# Patient Record
Sex: FEMALE | Race: WHITE | NOT HISPANIC OR LATINO | Employment: UNEMPLOYED | URBAN - METROPOLITAN AREA
[De-identification: names, ages, dates, MRNs, and addresses within clinical notes are randomized per-mention and may not be internally consistent; named-entity substitution may affect disease eponyms.]

---

## 2019-09-09 ENCOUNTER — TRANSCRIBE ORDERS (OUTPATIENT)
Dept: LAB | Facility: CLINIC | Age: 29
End: 2019-09-09

## 2019-09-09 ENCOUNTER — APPOINTMENT (OUTPATIENT)
Dept: LAB | Facility: CLINIC | Age: 29
End: 2019-09-09

## 2019-09-09 DIAGNOSIS — Z02.1 PRE-EMPLOYMENT EXAMINATION: ICD-10-CM

## 2019-09-09 DIAGNOSIS — Z02.1 PRE-EMPLOYMENT EXAMINATION: Primary | ICD-10-CM

## 2019-09-09 LAB — RUBV IGG SERPL IA-ACNC: 96.6 IU/ML

## 2019-09-09 PROCEDURE — 86735 MUMPS ANTIBODY: CPT

## 2019-09-09 PROCEDURE — 86480 TB TEST CELL IMMUN MEASURE: CPT

## 2019-09-09 PROCEDURE — 36415 COLL VENOUS BLD VENIPUNCTURE: CPT

## 2019-09-09 PROCEDURE — 86762 RUBELLA ANTIBODY: CPT

## 2019-09-09 PROCEDURE — 86765 RUBEOLA ANTIBODY: CPT

## 2019-09-10 LAB
MEV IGG SER QL: NORMAL
MUV IGG SER QL: NORMAL

## 2019-09-11 LAB
GAMMA INTERFERON BACKGROUND BLD IA-ACNC: 0.07 IU/ML
M TB IFN-G BLD-IMP: NEGATIVE
M TB IFN-G CD4+ BCKGRND COR BLD-ACNC: -0.02 IU/ML
M TB IFN-G CD4+ BCKGRND COR BLD-ACNC: -0.04 IU/ML
MITOGEN IGNF BCKGRD COR BLD-ACNC: 1.07 IU/ML

## 2019-10-23 ENCOUNTER — TELEPHONE (OUTPATIENT)
Dept: OBGYN CLINIC | Facility: MEDICAL CENTER | Age: 29
End: 2019-10-23

## 2019-10-24 ENCOUNTER — INITIAL PRENATAL (OUTPATIENT)
Dept: OBGYN CLINIC | Facility: MEDICAL CENTER | Age: 29
End: 2019-10-24

## 2019-10-24 ENCOUNTER — APPOINTMENT (OUTPATIENT)
Dept: LAB | Facility: CLINIC | Age: 29
End: 2019-10-24
Payer: COMMERCIAL

## 2019-10-24 ENCOUNTER — INITIAL PRENATAL (OUTPATIENT)
Dept: OBGYN CLINIC | Facility: CLINIC | Age: 29
End: 2019-10-24

## 2019-10-24 VITALS — WEIGHT: 163 LBS | SYSTOLIC BLOOD PRESSURE: 100 MMHG | DIASTOLIC BLOOD PRESSURE: 68 MMHG

## 2019-10-24 DIAGNOSIS — Z34.93 ENCOUNTER FOR PREGNANCY RELATED EXAMINATION IN THIRD TRIMESTER: ICD-10-CM

## 2019-10-24 DIAGNOSIS — Z34.93 ENCOUNTER FOR PREGNANCY RELATED EXAMINATION IN THIRD TRIMESTER: Primary | ICD-10-CM

## 2019-10-24 DIAGNOSIS — Z3A.28 28 WEEKS GESTATION OF PREGNANCY: ICD-10-CM

## 2019-10-24 DIAGNOSIS — Z34.93 THIRD TRIMESTER PREGNANCY: Primary | ICD-10-CM

## 2019-10-24 LAB
ABO GROUP BLD: NORMAL
BACTERIA UR QL AUTO: NORMAL /HPF
BASOPHILS # BLD MANUAL: 0 THOUSAND/UL (ref 0–0.1)
BASOPHILS NFR MAR MANUAL: 0 % (ref 0–1)
BILIRUB UR QL STRIP: NEGATIVE
BLD GP AB SCN SERPL QL: NEGATIVE
CLARITY UR: CLEAR
COLOR UR: YELLOW
EOSINOPHIL # BLD MANUAL: 0.21 THOUSAND/UL (ref 0–0.4)
EOSINOPHIL NFR BLD MANUAL: 2 % (ref 0–6)
ERYTHROCYTE [DISTWIDTH] IN BLOOD BY AUTOMATED COUNT: 13.7 % (ref 11.6–15.1)
GLUCOSE 1H P 50 G GLC PO SERPL-MCNC: 192 MG/DL
GLUCOSE UR STRIP-MCNC: ABNORMAL MG/DL
HBV SURFACE AG SER QL: NORMAL
HCT VFR BLD AUTO: 31.5 % (ref 34.8–46.1)
HGB BLD-MCNC: 10.4 G/DL (ref 11.5–15.4)
HGB UR QL STRIP.AUTO: NEGATIVE
HYALINE CASTS #/AREA URNS LPF: NORMAL /LPF
KETONES UR STRIP-MCNC: NEGATIVE MG/DL
LEUKOCYTE ESTERASE UR QL STRIP: ABNORMAL
LYMPHOCYTES # BLD AUTO: 1.07 THOUSAND/UL (ref 0.6–4.47)
LYMPHOCYTES # BLD AUTO: 10 % (ref 14–44)
MCH RBC QN AUTO: 33.3 PG (ref 26.8–34.3)
MCHC RBC AUTO-ENTMCNC: 33 G/DL (ref 31.4–37.4)
MCV RBC AUTO: 101 FL (ref 82–98)
MONOCYTES # BLD AUTO: 0.53 THOUSAND/UL (ref 0–1.22)
MONOCYTES NFR BLD: 5 % (ref 4–12)
NEUTROPHILS # BLD MANUAL: 8.45 THOUSAND/UL (ref 1.85–7.62)
NEUTS SEG NFR BLD AUTO: 79 % (ref 43–75)
NITRITE UR QL STRIP: NEGATIVE
NON-SQ EPI CELLS URNS QL MICRO: NORMAL /HPF
NRBC BLD AUTO-RTO: 0 /100 WBCS
PH UR STRIP.AUTO: 7 [PH]
PLATELET # BLD AUTO: 275 THOUSANDS/UL (ref 149–390)
PLATELET BLD QL SMEAR: ADEQUATE
PMV BLD AUTO: 10.3 FL (ref 8.9–12.7)
POLYCHROMASIA BLD QL SMEAR: PRESENT
PROT UR STRIP-MCNC: NEGATIVE MG/DL
RBC # BLD AUTO: 3.12 MILLION/UL (ref 3.81–5.12)
RBC #/AREA URNS AUTO: NORMAL /HPF
RBC MORPH BLD: PRESENT
RH BLD: POSITIVE
RUBV IGG SERPL IA-ACNC: 103.5 IU/ML
SP GR UR STRIP.AUTO: 1.02 (ref 1–1.03)
SPECIMEN EXPIRATION DATE: NORMAL
UROBILINOGEN UR QL STRIP.AUTO: 0.2 E.U./DL
VARIANT LYMPHS # BLD AUTO: 4 %
WBC # BLD AUTO: 10.69 THOUSAND/UL (ref 4.31–10.16)
WBC #/AREA URNS AUTO: NORMAL /HPF

## 2019-10-24 PROCEDURE — 80081 OBSTETRIC PANEL INC HIV TSTG: CPT

## 2019-10-24 PROCEDURE — PNV: Performed by: NURSE PRACTITIONER

## 2019-10-24 PROCEDURE — NOBC: Performed by: OBSTETRICS & GYNECOLOGY

## 2019-10-24 PROCEDURE — 36415 COLL VENOUS BLD VENIPUNCTURE: CPT

## 2019-10-24 PROCEDURE — 82950 GLUCOSE TEST: CPT

## 2019-10-24 PROCEDURE — 87086 URINE CULTURE/COLONY COUNT: CPT

## 2019-10-24 PROCEDURE — 81001 URINALYSIS AUTO W/SCOPE: CPT

## 2019-10-24 PROCEDURE — 86787 VARICELLA-ZOSTER ANTIBODY: CPT

## 2019-10-24 RX ORDER — DOCUSATE SODIUM 100 MG/1
100 CAPSULE, LIQUID FILLED ORAL
COMMUNITY
End: 2020-01-08 | Stop reason: HOSPADM

## 2019-10-24 NOTE — PATIENT INSTRUCTIONS
Pregnancy at 32 to 30 Weeks   AMBULATORY CARE:   What changes are happening to your body: You may notice new symptoms such as shortness of breath, heartburn, or swelling of your ankles and feet  You may also have trouble sleeping or contractions  Seek care immediately if:   · You develop a severe headache that does not go away  · You have new or increased vision changes, such as blurred or spotted vision  · You have new or increased swelling in your face or hands  · You have vaginal spotting or bleeding  · Your water broke or you feel warm water gushing or trickling from your vagina  Contact your healthcare provider if:   · You have more than 5 contractions in 1 hour  · You notice any changes in your baby's movements  · You have abdominal cramps, pressure, or tightening  · You have a change in vaginal discharge  · You have chills or a fever  · You have vaginal itching, burning, or pain  · You have yellow, green, white, or foul-smelling vaginal discharge  · You have pain or burning when you urinate, less urine than usual, or pink or bloody urine  · You have questions or concerns about your condition or care  How to care for yourself at this stage of your pregnancy:   · Eat a variety of healthy foods  Healthy foods include fruits, vegetables, whole-grain breads, low-fat dairy foods, beans, lean meats, and fish  Drink liquids as directed  Ask how much liquid to drink each day and which liquids are best for you  Limit caffeine to less than 200 milligrams each day  Limit your intake of fish to 2 servings each week  Choose fish low in mercury such as canned light tuna, shrimp, salmon, cod, or tilapia  Do not  eat fish high in mercury such as swordfish, tilefish, kesha mackerel, and shark  · Manage heartburn  by eating 4 or 5 small meals each day instead of large meals  Avoid spicy food  · Manage swelling  by lying down and putting your feet up       · Take prenatal vitamins as directed  Your need for certain vitamins and minerals, such as folic acid, increases during pregnancy  Prenatal vitamins provide some of the extra vitamins and minerals you need  Prenatal vitamins may also help to decrease the risk of certain birth defects  · Talk to your healthcare provider about exercise  Moderate exercise can help you stay fit  Your healthcare provider will help you plan an exercise program that is safe for you during pregnancy  · Do not smoke  If you smoke, it is never too late to quit  Smoking increases your risk of a miscarriage and other health problems during your pregnancy  Smoking can cause your baby to be born too early or weigh less at birth  Ask your healthcare provider for information if you need help quitting  · Do not drink alcohol  Alcohol passes from your body to your baby through the placenta  It can affect your baby's brain development and cause fetal alcohol syndrome (FAS)  FAS is a group of conditions that causes mental, behavior, and growth problems  · Talk to your healthcare provider before you take any medicines  Many medicines may harm your baby if you take them when you are pregnant  Do not take any medicines, vitamins, herbs, or supplements without first talking to your healthcare provider  Never use illegal or street drugs (such as marijuana or cocaine) while you are pregnant  Safety tips during pregnancy:   · Avoid hot tubs and saunas  Do not use a hot tub or sauna while you are pregnant, especially during your first trimester  Hot tubs and saunas may raise your baby's temperature and increase the risk of birth defects  · Avoid toxoplasmosis  This is an infection caused by eating raw meat or being around infected cat feces  It can cause birth defects, miscarriages, and other problems  Wash your hands after you touch raw meat  Make sure any meat is well-cooked before you eat it  Avoid raw eggs and unpasteurized milk   Use gloves or ask someone else to clean your cat's litter box while you are pregnant  Changes that are happening with your baby:  By 30 weeks, your baby may weigh more than 3 pounds  Your baby may be about 11 inches long from the top of the head to the rump (baby's bottom)  Your baby's eyes open and close now  Your baby's kicks and movements are more forceful at this time  What you need to know about prenatal care: Your healthcare provider will check your blood pressure and weight  You may also need the following:  · Blood tests  may be done to check for anemia or blood type  · A urine test  may also be done to check for sugar and protein  These can be signs of gestational diabetes or infection  Protein in your urine may also be a sign of preeclampsia  Preeclampsia is a condition that can develop during week 20 or later of your pregnancy  It causes high blood pressure, and it can cause problems with your kidneys and other organs  · A Tdap vaccine and flu vaccine  may be recommended by your healthcare provider  · A gestational diabetes screen  will be done using an oral glucose tolerance test (OGTT)  An OGTT starts with a blood sugar level check after you have not eaten for 8 hours  You are then given a glucose drink  Your blood sugar level is checked after 1 hour, 2 hours, and sometimes 3 hours  Healthcare providers look at how much your blood sugar level increases from the first check  · Fundal height  is a measurement of your uterus to check your baby's growth  This number is usually the same as the number of weeks that you have been pregnant  Your healthcare provider may also check your baby's position  · Your baby's heart rate  will be checked  © 2017 [facility] Information is for End User's use only and may not be sold, redistributed or otherwise used for commercial purposes   All illustrations and images included in CareNotes® are the copyrighted property of A D A M [employer] Inc  or [employer]  The above information is an  only  It is not intended as medical advice for individual conditions or treatments  Talk to your doctor, nurse or pharmacist before following any medical regimen to see if it is safe and effective for you  Kick Counts in Pregnancy   WHAT YOU NEED TO KNOW:   Kick counts measure how much your baby is moving in your womb  A kick from your baby can be felt as a twist, turn, swish, roll, or jab  It is common to feel your baby kicking at 26 to 28 weeks of pregnancy  You may feel your baby kick as early as 20 weeks of pregnancy  DISCHARGE INSTRUCTIONS:   Return to the emergency department if:   · You feel your baby kick less as the day goes on      · You do not feel any kicks in a day  Contact your healthcare provider if:   · You feel a change in the number of kicks or movements of your baby  · You feel fewer than 10 kicks within 2 hours after counting twice  · You have questions or concerns about your baby's movements  Why measure kick counts:  Your baby's movement may provide information about your baby's health  He may move less, or not at all, if there are problems  He may move less if he does not have enough room to grow in your uterus (womb)  He may also move less if he is not getting enough oxygen or nutrition from the placenta  Tell your healthcare provider as soon as you feel a change in your baby's movements  Problems that are found earlier are easier to treat  When to measure kick counts:   · Measure kick counts at the same time every day  · Measure kick counts when your baby is awake and most active  Your baby may be most active in the evening  · Measure kick counts after a meal or snack  Your baby may be more active after you eat  Wait 2 hours after you drink liquids that contain caffeine  Caffeine can make your baby more active than usual     · You should not smoke while you are pregnant   Smoking increases the risk of health problems for you and for your baby during your pregnancy  If you do smoke, wait 2 hours to measure kick counts  Nicotine can make your baby more active than usual   How to measure kick counts:  Check that your baby is awake before you measure kick counts  You can wake up your baby by lightly pushing on your belly, walking, or drinking something cold  Your healthcare provider may tell you different ways to measure kick counts  He may tell you to do the following:  · Use a chart or clock to keep track of the time you start and finish counting  · Sit in a chair or lie on your left side  · Place your hands on the largest part of your belly  · Count until you reach 10 kicks  Write down how much time it takes to count 10 kicks  · It may take 30 minutes to 2 hours to count 10 kicks  It should not take more than 2 hours to count 10 kicks  · If you do not feel 10 kicks within 2 hours, wait 1 hour and count again  Your baby can sleep for up to 40 minutes at one time  Follow up with your healthcare provider as directed:  Write down your questions so you remember to ask them during your visits  © 2017 [address] [employer] Information is for End User's use only and may not be sold, redistributed or otherwise used for commercial purposes  All illustrations and images included in CareNotes® are the copyrighted property of [employer]  or Nilesh Donald  The above information is an  only  It is not intended as medical advice for individual conditions or treatments  Talk to your doctor, nurse or pharmacist before following any medical regimen to see if it is safe and effective for you

## 2019-10-24 NOTE — PROGRESS NOTES
Sharman Gottron is a 30y o  year old  at 30w11d for routine prenatal visit  [facility] late transfer of care, b/c now works for [employer]  + FM, no vaginal bleeding, contractions, or LOF  Complaints: Yes feet swelling, advised increase water and decrease sodium in her diet  Most recent ultrasound and labs reviewed  Has Level 2 scheduled at M in 2 weeks  Went for glucose, cbc and PN panel today along with g/c culture and cc urine  Rest of records up to date and in EMR  Had pt schedule out her visits with Nicholas today  Fkc, ptl precautions reviewed

## 2019-10-24 NOTE — PROGRESS NOTES
OB INTAKE INTERVIEW      Pt presents for OB intake    OB History    Para Term  AB Living   2       1     SAB TAB Ectopic Multiple Live Births                  # Outcome Date GA Lbr Terrence/2nd Weight Sex Delivery Anes PTL Lv   2 Current            1 AB 2007 9w0d             Birth Comments: surgically induced        Presents for OB ed visit  States was seen previously at [facility] and is up to date with care  Provided records with minimal information    Hx of  delivery prior to 36 weeks 6 days: NO   Last Menstrual Period:   Patient's last menstrual period was 2019   ? History of Diabetes: NO  History of Hypertension: NO      Infection Screening: Does the pt have a hx of MRSA? NO    H&P visit scheduled later today  ? Interview education  Information on [facility] Pregnancy Essentials reviewed  Handouts given: Baby and Me phone eliel guide  Baby and Me support center  [facility] Dennie Sheng in Pregnancy information sheet   [facility]  Referral for level 2 US at [facility] given  Patient will schedule appointment  Declined CF carrier and SMA carrier screening today    Discussed Tdap and Influenza vaccines         Depression Screening Follow-up Plan: Patient's depression screening was NEGATIVE with an [place] score of 5                  The patient was oriented to our practice and all questions were answered    Interviewed by: Ramses Ruff RN 10/24/19

## 2019-10-25 LAB
BACTERIA UR CULT: NORMAL
HIV 1+2 AB+HIV1 P24 AG SERPL QL IA: NORMAL
RPR SER QL: NORMAL

## 2019-10-28 ENCOUNTER — TELEPHONE (OUTPATIENT)
Dept: OBGYN CLINIC | Facility: MEDICAL CENTER | Age: 29
End: 2019-10-28

## 2019-10-28 DIAGNOSIS — O24.410 DIET CONTROLLED GESTATIONAL DIABETES MELLITUS (GDM) IN THIRD TRIMESTER: Primary | ICD-10-CM

## 2019-10-28 NOTE — TELEPHONE ENCOUNTER
----- Message from Rhiannon Lee MD sent at 10/25/2019 11:57 AM EDT -----  Elevated one hour, 192  Gestational diabetic  Needs referral to educators   Anemia on CBC - recommend ferrous sulfate 325 mg po bid and colace as needed

## 2019-10-28 NOTE — TELEPHONE ENCOUNTER
Notified of need for additional iron and diagnosis of gestational diabetes  Referral to diabetes in pregnancy program made    Will purchase OTC iron and colace

## 2019-10-29 LAB — VZV IGG SER IA-ACNC: NORMAL

## 2019-10-31 ENCOUNTER — OFFICE VISIT (OUTPATIENT)
Dept: PERINATAL CARE | Facility: CLINIC | Age: 29
End: 2019-10-31
Payer: COMMERCIAL

## 2019-10-31 DIAGNOSIS — O24.410 DIET CONTROLLED GESTATIONAL DIABETES MELLITUS (GDM) IN THIRD TRIMESTER: Primary | ICD-10-CM

## 2019-10-31 DIAGNOSIS — Z3A.29 29 WEEKS GESTATION OF PREGNANCY: ICD-10-CM

## 2019-10-31 PROCEDURE — G0109 DIAB MANAGE TRN IND/GROUP: HCPCS | Performed by: DIETITIAN, REGISTERED

## 2019-10-31 RX ORDER — BLOOD SUGAR DIAGNOSTIC
STRIP MISCELLANEOUS
Qty: 100 EACH | Refills: 4 | Status: SHIPPED | OUTPATIENT
Start: 2019-10-31 | End: 2020-01-08 | Stop reason: HOSPADM

## 2019-10-31 RX ORDER — LANCETS 33 GAUGE
EACH MISCELLANEOUS
Qty: 100 EACH | Refills: 3 | Status: SHIPPED | OUTPATIENT
Start: 2019-10-31 | End: 2020-01-10 | Stop reason: ALTCHOICE

## 2019-10-31 NOTE — PROGRESS NOTES
10/31/19  Tessa Chase   1990  Estimated Date of Delivery: 1/10/20   EGA: 29w6d    Dear Dr Rizwan Rios:    Thank you for referring your patient to the Diabetes and Pregnancy Program at [address] [place] [address]  The patient attended class 1 and patient received the following education:     Pathophysiology of diabetes and pregnancy  This includes maternal-fetal complications such as fetal macrosomia,  hypoglycemia, polyhydramnios, increased incidence of  section, pre-term labor and in severe cases, fetal demise and stillbirth   Instruction on diet and glucometer use was provided  Self-monitoring of blood glucose levels: fasting (goal 60mg/dl to 90mg/dl) and two hours after the start of the meal less (goal less than 120mg/dl)  The patient was provided with a One-Touch Verio blood glucose meter and supplies  Blood glucose during demonstration was 90   Medical Nutrition Therapy for diabetes and pregnancy  The patient was provided with a 2100 calorie meal plan and the following was reviewed: Reports she already started a meal plan by following one from the Internet  Reported she is very hungry with her new Internet plan  Pateint discovered at the end of Class 1 that she was under eating the entire past week     o Basic review of macronutrients   o Meal pattern should consist of three small meals and three snacks daily  o Carbohydrate gram amounts per meal   o Instructions on how to read a food label  o Appropriate serving sizes for carbohydrates and proteins  o Incorporating protein at each meal and snack  o Maintain a three day food diary and bring to class 2    Report blood glucose levels to the [address] weekly or as directed:  o Phone : [phone]  If no response in 24 hours, call [phone]   o Fax: [phone]  o Email: [email or web address]  The patient is scheduled to attend class 2 on Thursday, 19    Additionally, fetal ultrasound evaluation by the Perinatologist has been scheduled to assure continuity of care  Please contact the Diabetes and Pregnancy Program at [phone] if you have any questions  Time spent with patient 2:10-3:10 PM; time spent face to face counseling greater than 50% of the appointment      Sincerely,   Charisma Barlow  Diabetes Educator   Diabetes and Pregnancy Program

## 2019-11-05 ENCOUNTER — ROUTINE PRENATAL (OUTPATIENT)
Dept: PERINATAL CARE | Facility: OTHER | Age: 29
End: 2019-11-05
Payer: COMMERCIAL

## 2019-11-05 VITALS
HEIGHT: 65 IN | HEART RATE: 77 BPM | DIASTOLIC BLOOD PRESSURE: 71 MMHG | WEIGHT: 166 LBS | BODY MASS INDEX: 27.66 KG/M2 | SYSTOLIC BLOOD PRESSURE: 107 MMHG

## 2019-11-05 DIAGNOSIS — Z36.3 ENCOUNTER FOR ANTENATAL SCREENING FOR MALFORMATIONS: ICD-10-CM

## 2019-11-05 DIAGNOSIS — Z3A.30 30 WEEKS GESTATION OF PREGNANCY: ICD-10-CM

## 2019-11-05 DIAGNOSIS — O24.410 DIET CONTROLLED GESTATIONAL DIABETES MELLITUS (GDM) IN THIRD TRIMESTER: Primary | ICD-10-CM

## 2019-11-05 PROCEDURE — 76805 OB US >/= 14 WKS SNGL FETUS: CPT | Performed by: OBSTETRICS & GYNECOLOGY

## 2019-11-05 PROCEDURE — 99241 PR OFFICE CONSULTATION NEW/ESTAB PATIENT 15 MIN: CPT | Performed by: OBSTETRICS & GYNECOLOGY

## 2019-11-05 RX ORDER — FERROUS SULFATE 325(65) MG
325 TABLET ORAL
COMMUNITY
End: 2020-01-08 | Stop reason: HOSPADM

## 2019-11-05 NOTE — PATIENT INSTRUCTIONS
Kick Counts in Pregnancy   WHAT YOU NEED TO KNOW:   Kick counts measure how much your baby is moving in your womb  A kick from your baby can be felt as a twist, turn, swish, roll, or jab  It is common to feel your baby kicking at 26 to 28 weeks of pregnancy  You may feel your baby kick as early as 20 weeks of pregnancy  DISCHARGE INSTRUCTIONS:   Return to the emergency department if:   · You feel your baby kick less as the day goes on      · You do not feel any kicks in a day  Contact your healthcare provider if:   · You feel a change in the number of kicks or movements of your baby  · You feel fewer than 10 kicks within 2 hours after counting twice  · You have questions or concerns about your baby's movements  Why measure kick counts:  Your baby's movement may provide information about your baby's health  He may move less, or not at all, if there are problems  He may move less if he does not have enough room to grow in your uterus (womb)  He may also move less if he is not getting enough oxygen or nutrition from the placenta  Tell your healthcare provider as soon as you feel a change in your baby's movements  Problems that are found earlier are easier to treat  When to measure kick counts:   · Measure kick counts at the same time every day  · Measure kick counts when your baby is awake and most active  Your baby may be most active in the evening  · Measure kick counts after a meal or snack  Your baby may be more active after you eat  Wait 2 hours after you drink liquids that contain caffeine  Caffeine can make your baby more active than usual     · You should not smoke while you are pregnant  Smoking increases the risk of health problems for you and for your baby during your pregnancy  If you do smoke, wait 2 hours to measure kick counts  Nicotine can make your baby more active than usual   How to measure kick counts:  Check that your baby is awake before you measure kick counts   You can wake up your baby by lightly pushing on your belly, walking, or drinking something cold  Your healthcare provider may tell you different ways to measure kick counts  He may tell you to do the following:  · Use a chart or clock to keep track of the time you start and finish counting  · Sit in a chair or lie on your left side  · Place your hands on the largest part of your belly  · Count until you reach 10 kicks  Write down how much time it takes to count 10 kicks  · It may take 30 minutes to 2 hours to count 10 kicks  It should not take more than 2 hours to count 10 kicks  · If you do not feel 10 kicks within 2 hours, wait 1 hour and count again  Your baby can sleep for up to 40 minutes at one time  Follow up with your healthcare provider as directed:  Write down your questions so you remember to ask them during your visits  © 2017 [address] [employer] Information is for End User's use only and may not be sold, redistributed or otherwise used for commercial purposes  All illustrations and images included in CareNotes® are the copyrighted property of [employer]  or Nilesh Donald  The above information is an  only  It is not intended as medical advice for individual conditions or treatments  Talk to your doctor, nurse or pharmacist before following any medical regimen to see if it is safe and effective for you

## 2019-11-05 NOTE — LETTER
November 5, 2019     Guy Bonilla MD  [address]    Patient: Jo Ann Manuel   YOB: 1990   Date of Visit: 11/5/2019       Dear Dr Nash Oakley:    Thank you for referring Jo Ann Manuel to me for evaluation  Below are my notes for this consultation  If you have questions, please do not hesitate to call me  I look forward to following your patient along with you           Sincerely,        Lucy Barrientos MD        CC: No Recipients

## 2019-11-05 NOTE — PROGRESS NOTES
Thank you very much for your kind referral of Tessa Chase for fetal ultrasound evaluation and MFM consult at [facility] on 2019  Agustina Salinas is a 68-year-old  white female who is currently at 27 and 4/7 weeks gestation by an estimated due date of January 10, 2020 which is based upon menstrual dating  She presents for the indication of a recent diagnosis of gestational diabetes, based upon an elevated 1 hour screening Glucola value of 192 mg/dL on 2019  She recently had initial evaluation within the Diabetes and Pregnancy Program in the [facility], [facility]  Agustina Salinas recently transferred prenatal care to your practice, having had care earlier in the pregnancy at [facility] has no specific complaints today  She reports regular fetal movements and denies problems related to vaginal bleeding,  labor, or hypertension  Agustina Salinas has a history of one elective   Her past medical history is significant for an anxiety disorder, currently treated with 100 mg of Zoloft today  Her past surgical history is significant for an appendectomy, tonsillectomy, and wisdom teeth removal   She has allergies to Percocet and the pertussis vaccine  Agustina Salinas denies tobacco, alcohol, or illicit drug use during the pregnancy  Her brother had a congenital heart defect, an ASD and mitral valve abnormality  The family genetic history is otherwise negative with respect to genetic abnormalities, birth defects, or mental retardation  Her mom and dad each has hypertension  There is no first-degree family member otherwise with a diagnosis of diabetes, venous thromboembolism, or preeclampsia  No fetal structural abnormality or ultrasound marker for aneuploidy is identified on the Level II ultrasound study today   Multiple anatomic targets are suboptimally imaged secondary to unfavorable fetal position and the relatively late gestational age    The cervix is not well visualized by transabdominal sonography  Fetal growth and amniotic fluid volume are normal   The placenta is normal in appearance  Today's ultrasound findings and suggested follow-up were discussed in detail with Archana  We discussed that prenatal ultrasound cannot rule out all congenital abnormalities  Daily third trimester fetal kick counting was discussed at the visit today  Ibrahima Pulido will return in four weeks to assess interval growth and evaluate anatomic targets not imaged well today  Non stress testing is recommended for additional pregnancy surveillance if medical therapy to treat gestational diabetes is necessary, or when she reaches her estimated due date  Ibrahima Pulido should  maintain contact with the Diabetes and Pregnancy program in the [facility], [facility]  at least once a week for review of her blood glucose values and adjustment of insulin doses  Archana and I discussed that her diagnosis of gestational diabetes during this pregnancy is associated with an increased risk for the development of overt, Type II diabetes later in her life  Her risk for developing Type II diabetes is as high as 70%  A 75 gram, two-hour, OGTT is recommended as initial follow up 4-12 weeks following delivery  The face to face time, in addition to time spent discussing ultrasound results, was approximately 15 minutes, greater than 50% of which was spent during counseling and coordination of care

## 2019-11-06 ENCOUNTER — DOCUMENTATION (OUTPATIENT)
Dept: PERINATAL CARE | Facility: CLINIC | Age: 29
End: 2019-11-06

## 2019-11-06 NOTE — PROGRESS NOTES
Date:  19  RE: Emerita Johns    : 1990  Estimated Date of Delivery: 1/10/20  EGA: 30w5d  OB/GYN: [facility]ell[facility]  Diet controlled gestational diabetes          Current regimen:  2100 calorie GDM diet with 3 meals & 3 snacks  Self-Blood Glucose Monitoring with a One-Touch Verio glucose meter    Plan:  Continue current regimen  If okay by OB, walk 20-30 minutes daily  19 Ultrasound indicates normal growth & fluids  Class 2 scheduled for tomorrow, 19      Date due to report next:  Monday,     Kaylie Ruelas  Diabetes Educator   Diabetes and Pregnancy Program

## 2019-11-07 ENCOUNTER — OFFICE VISIT (OUTPATIENT)
Dept: PERINATAL CARE | Facility: CLINIC | Age: 29
End: 2019-11-07
Payer: COMMERCIAL

## 2019-11-07 VITALS
DIASTOLIC BLOOD PRESSURE: 67 MMHG | BODY MASS INDEX: 27.42 KG/M2 | HEART RATE: 78 BPM | HEIGHT: 65 IN | WEIGHT: 164.6 LBS | SYSTOLIC BLOOD PRESSURE: 101 MMHG

## 2019-11-07 DIAGNOSIS — O24.410 DIET CONTROLLED GESTATIONAL DIABETES MELLITUS (GDM) IN THIRD TRIMESTER: Primary | ICD-10-CM

## 2019-11-07 DIAGNOSIS — Z3A.30 30 WEEKS GESTATION OF PREGNANCY: ICD-10-CM

## 2019-11-07 PROCEDURE — G0108 DIAB MANAGE TRN  PER INDIV: HCPCS | Performed by: DIETITIAN, REGISTERED

## 2019-11-07 NOTE — PROGRESS NOTES
DATE:  19  RE: Winston Phoenix    : 1990    DAVID: Estimated Date of Delivery: 1/10/20    EGA: 30w6d    Dear Dr Brendon Turner:    Thank you for referring your patient to the Diabetes and Pregnancy Program at [address] [place] [address]  The patient attended Class 2 received the following education:    Weight gain during in pregnancy  Based on the patients height of 5' 5" (1 651 m) inches, pre-pregnancy weight o 135 lbs pounds (BMI-22 5), we would recommend a total weight gain of 25-35 pounds for the pregnancy   The patients current weight is 74 7 kg (164 lb 9 6 oz) pounds, and her weight gain to date is 29 5 pounds   Medical Nutrition Therapy for diabetes and pregnancy  The patients three day food diary was reviewed and discussed  The patient was instructed on the following:  o Individualized meal plan  Very concerned about controlling her BG & has been limiting her foods intake to control her BG  Has been counting her CHO gm closely  Uses many organic foods & is eating some vegetarian protein sources as not fond of meat this pregnancy  Has been counting the CHO gm properly  Discussed the importance of eating adequate calories so as to avoid starvation ketosis  Agreed to consume 30 gm proteinat all 3 meals & increase to 5 oz protein at lunch & dinner  o Use of food diary to maintain a meal plan    o Importance of protein as it relates to blood glucose control   Review of blood glucose log  Reinforcement of blood glucose goals and reporting guidelines   Ultrasounds every four weeks in the [address] to evaluate fetal growth   Exercise Guidelines:   o Walking up to thirty minutes daily can reduce blood glucose levels  o Monitor for greater than four contractions per hour     o The patient has been instructed not to begin physical activity if she has been instructed not to exercise by your office     Sick day guidelines and hypoglycemia with treatment   Post-partum guidelines:  o Completion of a 75 gram glucose tolerance test at 6 weeks post-partum to check for type 2 diabetes  o 20% weight loss and 30 minutes of exercise 5 times per week reduces the risk of type 2 diabetes   Breastfeeding guidelines   Report blood glucose levels to [address] weekly or as directed  o Phone: [phone]  If no response in 24 hours, call [phone]    o Fax: [phone]  o Email: [email or web address]    Please contact the Diabetes and Pregnancy Program at [phone] if you have questions  Time spent with patient 3:15-4:30 PM; time spent face to face counseling greater than 50% of the appointment      Sincerely,     Alcira Raymond  Diabetes Educator  [facility]regnancy [facility]

## 2019-11-08 ENCOUNTER — TELEPHONE (OUTPATIENT)
Dept: OBGYN CLINIC | Facility: MEDICAL CENTER | Age: 29
End: 2019-11-08

## 2019-11-08 NOTE — TELEPHONE ENCOUNTER
Patient called concerned about her glucose levels today  Fasting glucose in the AM was 100, She ate breakfast 2 hours later felt sweaty, was seeing spots and had nausea checked glucose it was 84,she ate a snack glucose level 104  she later ate lunch a calzone with pepperoni and cheese checked her glucose it was 176  Patient advised to be more careful with carb intake,to decrease carbs and increase protein  If any other concerns or symptoms call office back

## 2019-11-11 ENCOUNTER — DOCUMENTATION (OUTPATIENT)
Dept: PERINATAL CARE | Facility: OTHER | Age: 29
End: 2019-11-11

## 2019-11-11 NOTE — PROGRESS NOTES
Date:  19  RE: Ezra Mccarty    : 1990  Estimated Date of Delivery: 1/10/20  EGA: 31w3d  OB/GYN: [facility]  Diet controlled gestational diabetes third trimester          Current regimen:  2100 calorie GDM diet with 3 meals & 3 snacks  Self-Blood Glucose Monitoring with a One-Touch Verio glucose meter    Plan:  2 out of 4 fasting glucose > 90 mg/dL  Multiple PP elevations noted as well  Recommend basal/bolus insulin for management  Will have Gilma Anis call to schedule insulin education appointment with patient for this week ideally  If okay by OB, walk 20-30 minutes daily  19 Ultrasound indicates normal growth & fluids    Date due to report next:   At insulin education appointment or by     Alka Robles RD, LDN  Diabetes Educator   [facility]regnancy [facility]

## 2019-11-13 ENCOUNTER — ROUTINE PRENATAL (OUTPATIENT)
Dept: OBGYN CLINIC | Facility: MEDICAL CENTER | Age: 29
End: 2019-11-13
Payer: COMMERCIAL

## 2019-11-13 ENCOUNTER — OFFICE VISIT (OUTPATIENT)
Dept: PERINATAL CARE | Facility: CLINIC | Age: 29
End: 2019-11-13
Payer: COMMERCIAL

## 2019-11-13 VITALS
SYSTOLIC BLOOD PRESSURE: 110 MMHG | DIASTOLIC BLOOD PRESSURE: 72 MMHG | WEIGHT: 165.4 LBS | HEIGHT: 62 IN | BODY MASS INDEX: 30.44 KG/M2 | HEART RATE: 88 BPM

## 2019-11-13 VITALS — BODY MASS INDEX: 27.49 KG/M2 | SYSTOLIC BLOOD PRESSURE: 102 MMHG | DIASTOLIC BLOOD PRESSURE: 66 MMHG | WEIGHT: 165.2 LBS

## 2019-11-13 DIAGNOSIS — Z3A.31 31 WEEKS GESTATION OF PREGNANCY: ICD-10-CM

## 2019-11-13 DIAGNOSIS — Z23 NEED FOR TDAP VACCINATION: ICD-10-CM

## 2019-11-13 DIAGNOSIS — Z34.93 ENCOUNTER FOR PREGNANCY RELATED EXAMINATION IN THIRD TRIMESTER: Primary | ICD-10-CM

## 2019-11-13 DIAGNOSIS — O24.414 INSULIN CONTROLLED GESTATIONAL DIABETES MELLITUS (GDM) IN THIRD TRIMESTER: Primary | ICD-10-CM

## 2019-11-13 DIAGNOSIS — O24.410 DIET CONTROLLED GESTATIONAL DIABETES MELLITUS (GDM) IN THIRD TRIMESTER: ICD-10-CM

## 2019-11-13 PROBLEM — O24.419 GESTATIONAL DIABETES MELLITUS (GDM) IN THIRD TRIMESTER: Status: ACTIVE | Noted: 2019-11-13

## 2019-11-13 LAB
SL AMB  POCT GLUCOSE, UA: NEGATIVE
SL AMB POCT URINE PROTEIN: NEGATIVE

## 2019-11-13 PROCEDURE — G0108 DIAB MANAGE TRN  PER INDIV: HCPCS

## 2019-11-13 PROCEDURE — PNV: Performed by: OBSTETRICS & GYNECOLOGY

## 2019-11-13 PROCEDURE — 90471 IMMUNIZATION ADMIN: CPT | Performed by: OBSTETRICS & GYNECOLOGY

## 2019-11-13 PROCEDURE — 90715 TDAP VACCINE 7 YRS/> IM: CPT | Performed by: OBSTETRICS & GYNECOLOGY

## 2019-11-13 PROCEDURE — 81002 URINALYSIS NONAUTO W/O SCOPE: CPT | Performed by: OBSTETRICS & GYNECOLOGY

## 2019-11-13 RX ORDER — INSULIN DETEMIR 100 [IU]/ML
INJECTION, SOLUTION SUBCUTANEOUS
Qty: 15 ML | Refills: 0 | Status: SHIPPED | OUTPATIENT
Start: 2019-11-13 | End: 2019-12-31 | Stop reason: SDUPTHER

## 2019-11-13 RX ORDER — PEN NEEDLE, DIABETIC 31 G X1/4"
NEEDLE, DISPOSABLE MISCELLANEOUS
Refills: 0 | Status: CANCELLED | OUTPATIENT
Start: 2019-11-13

## 2019-11-13 NOTE — PROGRESS NOTES
Janet Viera is a 30y o  year old  at 31w5d for routine prenatal visit  GTT and CBC done today  RhoGam needed No  Tdap needed Yes Given: Yes  FKC reviewed  28 week booklet and birth plan given today  A1GDM - likely - has appt at [facility] for  Insulin start    We discussed risks and benefits / aware if A2GMD - will need APFS   Aware NST only can be done here and NST with LARISSA at [facility]   All questions answered  Has increased zoloft to 100 mg mainly for axiety

## 2019-11-13 NOTE — PROGRESS NOTES
DATE: 19   RE: Isaac Mayo   : 1990  DAVID: Estimated Date of Delivery: 1/10/20  EGA:  31w5d  OBGYN: Tati      Your patient was seen in the office today for insulin teaching  Patient's blood glucose log copied at appointment  Current regimen:  2100 calorie GDM diet with 3 meals & 3 snacks  Self-Blood Glucose Monitoring with a One-Touch Verio glucose meter     Plan:  Continue diet resuming either 135 grams or 175 grams (2100 calorie) GDM diet  Patient has been attempting to manipulate diet under eating at times in attempts to control BG  Continue SMBG  Height: 5' 2" (1 575 m)   Weight: 75 kg (165 lb 6 4 oz)    The patient was instructed on the following:     Flex-touch Pen use  Initiate Levemir at 25 units before breakfast    Insulin administration times, insulin action   Hypoglycemia signs, symptoms and treatment   Increase in maternal-fetal surveillance with insulin initiation   Side effects of hyperglycemia in pregnancy including macrosomia,  hypoglycemia, polyhydramnios, pre-term labor and stillbirth   Continue to monitor blood glucoses via fingerstick fasting (goal 60 mg/dl to 90 mg/dl) and two hours post prandial (goal less than 120 mg/dl)   Follow up with our office on Monday, 19 for evaluation of blood glucose levels   Non-stress testing two times weekly and LARISSA testing beginning at 32 weeks gestation   Ultrasounds every 4 weeks at the [address]   HbA1c every 6 to 8 weeks, CMP ordered  Thank you for the opportunity to participate in the care of this patient  I can be reached at [phone] should you have any questions  Time spent with patient [phone]; time spent face to face counseling greater than 50% of the appointment      Sincerely,     Nadia Cornelius RN BS CDE  Diabetes Educator  Di[facility]regnancy [facility]

## 2019-11-14 ENCOUNTER — APPOINTMENT (OUTPATIENT)
Dept: LAB | Facility: CLINIC | Age: 29
End: 2019-11-14
Payer: COMMERCIAL

## 2019-11-14 DIAGNOSIS — O24.414 INSULIN CONTROLLED GESTATIONAL DIABETES MELLITUS (GDM) IN THIRD TRIMESTER: ICD-10-CM

## 2019-11-14 DIAGNOSIS — Z3A.31 31 WEEKS GESTATION OF PREGNANCY: ICD-10-CM

## 2019-11-14 LAB
ALBUMIN SERPL BCP-MCNC: 2.6 G/DL (ref 3.5–5)
ALP SERPL-CCNC: 95 U/L (ref 46–116)
ALT SERPL W P-5'-P-CCNC: 17 U/L (ref 12–78)
ANION GAP SERPL CALCULATED.3IONS-SCNC: 9 MMOL/L (ref 4–13)
AST SERPL W P-5'-P-CCNC: 12 U/L (ref 5–45)
BILIRUB SERPL-MCNC: 0.24 MG/DL (ref 0.2–1)
BUN SERPL-MCNC: 8 MG/DL (ref 5–25)
CALCIUM SERPL-MCNC: 8.8 MG/DL (ref 8.3–10.1)
CHLORIDE SERPL-SCNC: 108 MMOL/L (ref 100–108)
CO2 SERPL-SCNC: 23 MMOL/L (ref 21–32)
CREAT SERPL-MCNC: 0.54 MG/DL (ref 0.6–1.3)
EST. AVERAGE GLUCOSE BLD GHB EST-MCNC: 94 MG/DL
GFR SERPL CREATININE-BSD FRML MDRD: 126 ML/MIN/1.73SQ M
GLUCOSE P FAST SERPL-MCNC: 87 MG/DL (ref 65–99)
HBA1C MFR BLD: 4.9 % (ref 4.2–6.3)
POTASSIUM SERPL-SCNC: 4.1 MMOL/L (ref 3.5–5.3)
PROT SERPL-MCNC: 6.2 G/DL (ref 6.4–8.2)
SODIUM SERPL-SCNC: 140 MMOL/L (ref 136–145)

## 2019-11-14 PROCEDURE — 83036 HEMOGLOBIN GLYCOSYLATED A1C: CPT

## 2019-11-14 PROCEDURE — 36415 COLL VENOUS BLD VENIPUNCTURE: CPT

## 2019-11-14 PROCEDURE — 80053 COMPREHEN METABOLIC PANEL: CPT

## 2019-11-15 ENCOUNTER — TELEPHONE (OUTPATIENT)
Dept: PERINATAL CARE | Facility: CLINIC | Age: 29
End: 2019-11-15

## 2019-11-15 NOTE — TELEPHONE ENCOUNTER
Spoke with patient via phone per request of Diabetes & Pregnancy   Patient stated she prefers insulin to control meal time BG rather than using metformin  Plans to continue taking 25 units Levemir at 8 AM    Stated she gets full quickly & is eating 2 snacks between meals (meals are 6 hours apart) & has 2 hours between snacks & all meals  Stressed the I importance of eating adequate calories of well-balanced meals to provide good nutrition for fetal growth  Discussed ways to save space in her stomach by limiting non-starchy vegetables, using milk as a beverage, & switching a CHO serving for 1 oz protein (CHO foods take up more space in the stomach than does 1 oz protein)  Patient is very frustrated about her high BG results melvin when following the diet closely  The example she provided with a high BG contained no fiber at that meal; advised her to always have a high fiber food when eats a low fiber meal such as a brioche roll sandwich  Advised her to add either a small salad or a fruit to that meal  Another example of a meal that caused high BG was fish sticks & white rice  Explained to her to avoid fried foods & limit white rice & add a food with fiber to that meal  Understands all explanations

## 2019-11-18 ENCOUNTER — DOCUMENTATION (OUTPATIENT)
Dept: PERINATAL CARE | Facility: OTHER | Age: 29
End: 2019-11-18

## 2019-11-18 ENCOUNTER — ULTRASOUND (OUTPATIENT)
Dept: PERINATAL CARE | Facility: OTHER | Age: 29
End: 2019-11-18
Payer: COMMERCIAL

## 2019-11-18 VITALS
BODY MASS INDEX: 27.76 KG/M2 | HEIGHT: 65 IN | DIASTOLIC BLOOD PRESSURE: 67 MMHG | SYSTOLIC BLOOD PRESSURE: 101 MMHG | HEART RATE: 85 BPM | WEIGHT: 166.6 LBS

## 2019-11-18 DIAGNOSIS — Z3A.32 32 WEEKS GESTATION OF PREGNANCY: ICD-10-CM

## 2019-11-18 DIAGNOSIS — O24.410 DIET CONTROLLED GESTATIONAL DIABETES MELLITUS (GDM) IN THIRD TRIMESTER: ICD-10-CM

## 2019-11-18 DIAGNOSIS — O24.414 INSULIN CONTROLLED GESTATIONAL DIABETES MELLITUS (GDM) IN THIRD TRIMESTER: Primary | ICD-10-CM

## 2019-11-18 PROCEDURE — 76818 FETAL BIOPHYS PROFILE W/NST: CPT | Performed by: OBSTETRICS & GYNECOLOGY

## 2019-11-18 NOTE — PATIENT INSTRUCTIONS
Nonstress Test for Pregnancy   WHAT YOU NEED TO KNOW:   What do I need to know about a nonstress test?  A nonstress test measures your baby's heart rate and movements  Nonstress means that no stress will be placed on your baby during the test    How do I prepare for a nonstress test?  Your healthcare provider will talk to you about how to prepare for this test  He may tell you to eat and drink plenty of fluids before your test  If you smoke, you may be asked not to smoke within 2 hours before the test  He will also tell you what medicines to take or not take on the day of your test    What will happen during a nonstress test?  You may be asked to lie down or recline back for the test on a bed  One or two belts with sensors will be placed around your abdomen  Your baby's heart rate will be recorded with a machine  If your baby does not move, your baby may be asleep  Your healthcare provider may make a noise near your abdomen to try to wake your baby  The test usually takes about 20 minutes, but can take longer if your baby needs to be awakened  What do I need to know about the test results? Your baby will be expected to move at least twice for a certain amount of time  Your baby's heart rate will be expected to go up by a certain number of beats per minute during movement  If your baby does not move as expected, the test may need to be repeated or you may need other tests  CARE AGREEMENT:   You have the right to help plan your care  Learn about your health condition and how it may be treated  Discuss treatment options with your caregivers to decide what care you want to receive  You always have the right to refuse treatment  The above information is an  only  It is not intended as medical advice for individual conditions or treatments  Talk to your doctor, nurse or pharmacist before following any medical regimen to see if it is safe and effective for you    © 2017 [employer]  Information is for End User's use only and may not be sold, redistributed or otherwise used for commercial purposes  All illustrations and images included in CareNotes® are the copyrighted property of A D A M , Inc  or Nilesh Donald

## 2019-11-18 NOTE — PROGRESS NOTES
DATE: 19   RE: Matt Every   : 1990  DAVID: Estimated Date of Delivery: 1/10/20  EGA:  32w3d  OBGYN: Tati  Insulin controlled GDM in third trimester    Blood Sugars reported during NST appointment today  Current regimen:  Levemir- 25 units before breakfast (started morning of Friday 11/15)  2100 calorie GDM diet with 3 meals & 3 snacks  Self-Blood Glucose Monitoring with a One-Touch Verio glucose meter     Plan:  Levemir- continue 25 units before breakfast being consistent with taking at same time every day  Pt continues with multiple PP elevations  Per her diet recall she is regularly having english muffin egg sandwiches with egg, cheese and soto and 4 oz milk for breakfast, snacks include chobani greek yogurts, crackers and cheese, or apples and peanut butter, lunch or dinner is chicken, steak, or burger (no bun) with small baked potato  She is also having mixed greens and cucumbers and carrots with hummus  Overall balanced meals  She is not always having 30 gm of carbohydrate at lunch and dinner, advised patient to have at least 30 gm of carbohydrate at meals and at least 15 gms of carbohydrate at all snacks  Also, Advised patient it takes 3-5 days to see impact of insulin once starting on blood sugars  Advised patient to report on Thursday,  to review and titrate insulin as needed  Continue SMBG  Note: pt reports she has been very stressed since finding out about her GDM diagnosis, and feels that that is contributing to her elevations        US: fetal growth and LARISSA normal   US: LARISSA normal    Date to report next:         Sage Sidhu RD, LDN  Diabetes Educator  Diabetes [facility] Pregnancy Program

## 2019-11-19 PROBLEM — O24.414 INSULIN CONTROLLED GESTATIONAL DIABETES MELLITUS (GDM) IN THIRD TRIMESTER: Status: ACTIVE | Noted: 2019-11-05

## 2019-11-19 PROBLEM — Z3A.32 32 WEEKS GESTATION OF PREGNANCY: Status: ACTIVE | Noted: 2019-11-19

## 2019-11-19 PROBLEM — O24.414 INSULIN CONTROLLED GESTATIONAL DIABETES MELLITUS (GDM) IN THIRD TRIMESTER: Status: ACTIVE | Noted: 2019-11-13

## 2019-11-21 ENCOUNTER — ROUTINE PRENATAL (OUTPATIENT)
Dept: PERINATAL CARE | Facility: OTHER | Age: 29
End: 2019-11-21
Payer: COMMERCIAL

## 2019-11-21 VITALS
HEART RATE: 90 BPM | HEIGHT: 65 IN | WEIGHT: 164.4 LBS | DIASTOLIC BLOOD PRESSURE: 70 MMHG | BODY MASS INDEX: 27.39 KG/M2 | SYSTOLIC BLOOD PRESSURE: 109 MMHG

## 2019-11-21 DIAGNOSIS — O24.414 INSULIN CONTROLLED GESTATIONAL DIABETES MELLITUS (GDM) IN THIRD TRIMESTER: ICD-10-CM

## 2019-11-21 DIAGNOSIS — Z3A.32 32 WEEKS GESTATION OF PREGNANCY: Primary | ICD-10-CM

## 2019-11-21 PROCEDURE — 59025 FETAL NON-STRESS TEST: CPT | Performed by: OBSTETRICS & GYNECOLOGY

## 2019-11-21 NOTE — PATIENT INSTRUCTIONS

## 2019-11-22 ENCOUNTER — DOCUMENTATION (OUTPATIENT)
Dept: PERINATAL CARE | Facility: CLINIC | Age: 29
End: 2019-11-22

## 2019-11-22 NOTE — PROGRESS NOTES
DATE: 19   RE: Karena Hazel   : 1990  DAVID: Estimated Date of Delivery: 1/10/20  EGA:  33w0d  OBGYN: Tati  Insulin controlled GDM in third trimester    Blood Sugars reported e-mail  Current regimen:  Levemir- 25 units before breakfast (started morning of Friday 11/15)  2100 calorie GDM diet with 3 meals & 3 snacks  Self-Blood Glucose Monitoring with a One-Touch Verio glucose meter  Per her diet recall she is regularly having english muffin egg sandwiches with egg, cheese and soto and 4 oz milk for breakfast, snacks include chobani greek yogurts, crackers and cheese, or apples and peanut butter, lunch or dinner is chicken, steak, or burger (no bun) with small baked potato  She is also having mixed greens and cucumbers and carrots with hummus  Overall balanced meals  She is not always having 30 gm of carbohydrate at lunch and dinner, advised patient to have at least 30 gm of carbohydrate at meals and at least 15 gms of carbohydrate at all snacks  Plan:  Levemir- Increase from 25 units to 30 units at 8:00 or 9:00 am taking at same time every day  May need bolus insulin by next blood sugar log report  Continue SMBG  Note: pt reports she has been very stressed since finding out about her GDM diagnosis, and feels that that is contributing to her elevations        US: fetal growth and LARISSA normal   US: LARISSA normal    Date to report next:   Wednesday,       Carter Funez RD, LDN  Diabetes Educator  Diabetes [facility] Pregnancy Program

## 2019-11-25 ENCOUNTER — ULTRASOUND (OUTPATIENT)
Dept: PERINATAL CARE | Facility: OTHER | Age: 29
End: 2019-11-25
Payer: COMMERCIAL

## 2019-11-25 VITALS
DIASTOLIC BLOOD PRESSURE: 70 MMHG | HEIGHT: 65 IN | SYSTOLIC BLOOD PRESSURE: 118 MMHG | BODY MASS INDEX: 28.12 KG/M2 | WEIGHT: 168.8 LBS | HEART RATE: 89 BPM

## 2019-11-25 DIAGNOSIS — Z3A.33 33 WEEKS GESTATION OF PREGNANCY: Primary | ICD-10-CM

## 2019-11-25 DIAGNOSIS — O24.414 INSULIN CONTROLLED GESTATIONAL DIABETES MELLITUS (GDM) IN THIRD TRIMESTER: ICD-10-CM

## 2019-11-25 PROCEDURE — 59025 FETAL NON-STRESS TEST: CPT | Performed by: OBSTETRICS & GYNECOLOGY

## 2019-11-25 PROCEDURE — 76815 OB US LIMITED FETUS(S): CPT | Performed by: OBSTETRICS & GYNECOLOGY

## 2019-11-25 NOTE — PATIENT INSTRUCTIONS

## 2019-11-25 NOTE — PROGRESS NOTES
Please refer to the [facility] ultrasound report in Ob Procedures for additional information regarding the visit to the [facility] [place], [facility]  today

## 2019-11-27 ENCOUNTER — ROUTINE PRENATAL (OUTPATIENT)
Dept: OBGYN CLINIC | Facility: MEDICAL CENTER | Age: 29
End: 2019-11-27
Payer: COMMERCIAL

## 2019-11-27 VITALS — SYSTOLIC BLOOD PRESSURE: 110 MMHG | BODY MASS INDEX: 28.07 KG/M2 | WEIGHT: 168.7 LBS | DIASTOLIC BLOOD PRESSURE: 70 MMHG

## 2019-11-27 VITALS — WEIGHT: 168.7 LBS | BODY MASS INDEX: 28.07 KG/M2 | SYSTOLIC BLOOD PRESSURE: 110 MMHG | DIASTOLIC BLOOD PRESSURE: 70 MMHG

## 2019-11-27 DIAGNOSIS — O24.414 INSULIN CONTROLLED GESTATIONAL DIABETES MELLITUS (GDM) IN THIRD TRIMESTER: Primary | ICD-10-CM

## 2019-11-27 PROCEDURE — PNV: Performed by: OBSTETRICS & GYNECOLOGY

## 2019-11-27 PROCEDURE — 59025 FETAL NON-STRESS TEST: CPT | Performed by: OBSTETRICS & GYNECOLOGY

## 2019-11-27 NOTE — PROGRESS NOTES
Erin Hines is a 30y o  year old  at 33w5d for routine prenatal visit    + FM, no vaginal bleeding, contractions, or LOF  Complaints: No   Most recent ultrasound and labs reviewed  A2GDM: sugars reviewed, still having occasional elevated 2 hour postprandial     Directly associated with eating carbs, notes when she avoids carbs, her sugars are normal   Still following with Diabetes Ed  NST done today, see flowsheet

## 2019-11-29 ENCOUNTER — TELEPHONE (OUTPATIENT)
Dept: OBGYN CLINIC | Facility: MEDICAL CENTER | Age: 29
End: 2019-11-29

## 2019-12-02 DIAGNOSIS — Z34.93 THIRD TRIMESTER PREGNANCY: Primary | ICD-10-CM

## 2019-12-02 RX ORDER — SERTRALINE HYDROCHLORIDE 100 MG/1
100 TABLET, FILM COATED ORAL DAILY
Qty: 30 TABLET | Refills: 1 | Status: SHIPPED | OUTPATIENT
Start: 2019-12-02 | End: 2020-01-28 | Stop reason: SDUPTHER

## 2019-12-03 ENCOUNTER — DOCUMENTATION (OUTPATIENT)
Dept: PERINATAL CARE | Facility: CLINIC | Age: 29
End: 2019-12-03

## 2019-12-03 ENCOUNTER — ULTRASOUND (OUTPATIENT)
Dept: PERINATAL CARE | Facility: OTHER | Age: 29
End: 2019-12-03
Payer: COMMERCIAL

## 2019-12-03 ENCOUNTER — APPOINTMENT (OUTPATIENT)
Dept: PERINATAL CARE | Facility: OTHER | Age: 29
End: 2019-12-03
Payer: COMMERCIAL

## 2019-12-03 VITALS
WEIGHT: 169.8 LBS | BODY MASS INDEX: 28.29 KG/M2 | DIASTOLIC BLOOD PRESSURE: 65 MMHG | SYSTOLIC BLOOD PRESSURE: 99 MMHG | HEIGHT: 65 IN | HEART RATE: 90 BPM

## 2019-12-03 DIAGNOSIS — O24.414 INSULIN CONTROLLED GESTATIONAL DIABETES MELLITUS (GDM) IN THIRD TRIMESTER: ICD-10-CM

## 2019-12-03 DIAGNOSIS — Z3A.34 34 WEEKS GESTATION OF PREGNANCY: Primary | ICD-10-CM

## 2019-12-03 PROCEDURE — 59025 FETAL NON-STRESS TEST: CPT | Performed by: OBSTETRICS & GYNECOLOGY

## 2019-12-03 PROCEDURE — 76816 OB US FOLLOW-UP PER FETUS: CPT | Performed by: OBSTETRICS & GYNECOLOGY

## 2019-12-03 NOTE — PROGRESS NOTES
DATE: 19   RE: Sunny Pretty   : 1990  DAVID: Estimated Date of Delivery: 1/10/20  EGA:  34w4d  OBGYN: Tati  Insulin controlled GDM in third trimester    Blood Sugars reported e-mail  Current regimen:  Levemir- 25 units before breakfast (started morning of Friday 11/15)  2100 calorie GDM diet with 3 meals & 3 snacks  Self-Blood Glucose Monitoring with a One-Touch Verio glucose meter  Per her diet recall she is regularly having english muffin egg sandwiches with egg, cheese and soto and 4 oz milk for breakfast, snacks include chobani greek yogurts, crackers and cheese, or apples and peanut butter, lunch or dinner is chicken, steak, or burger (no bun) with small baked potato  She is also having mixed greens and cucumbers and carrots with hummus  Overall balanced meals  She is not always having 30 gm of carbohydrate at lunch and dinner, advised patient to have at least 30 gm of carbohydrate at meals and at least 15 gms of carbohydrate at all snacks  Plan:  Levemir- Increase from 25 units to 30 units at 8:00 or 9:00 am taking at same time every day  May need bolus insulin by next blood sugar log report  Continue SMBG  Note: pt reports she has been very stressed since finding out about her GDM diagnosis, and feels that that is contributing to her elevations        US: fetal growth and LARISSA normal   US: LARISSA normal    Date to report next:   Wednesday,       Maira Camarena RD, LDN  Diabetes Educator  Diabetes [facility] Pregnancy Program

## 2019-12-03 NOTE — PROGRESS NOTES
Fetal ultrasound at 34 4/7 weeks gestation  to evaluate growth  A2 GDM  See Ob procedures in EPIC  Ultrasound:  1  Fetal size = dates  EFW= 5lb 14 oz = 59% with AC at the 93%  2   No fetal anomalies observed  3  Normal LARISSA  4  Reactive NST      I reviewed the results of this ultrasound and answered  all the questions  Recommendations:      1  Follow-up ultrasound in 4 weeks to monitor fetal growth and development which was scheduled today  2  Twice x wk NST and daily fetal movement counts  Thank you for referring your patient to our offices  The limitations of ultrasound to detect all anomalies was reviewed and how it is not  a test to rule out aneuploidy  If you have any further questions do not hesitate to contact us as [phone]       Shaan Orlando MD

## 2019-12-03 NOTE — LETTER
NST sleeve cover sheet    Patient name: Gercia Roca  : 1990  MRN: [id number]    DAVID: Estimated Date of Delivery: 1/10/20    Obstetrician: _______________________________    Reason(s) for testing:  __________________________________________      Testing frequency:    ___ 2x/wk  ___ 1x/wk  ___ Dopplers  ___ BPP?       Last growth scan: __________________________________________

## 2019-12-04 ENCOUNTER — DOCUMENTATION (OUTPATIENT)
Dept: PERINATAL CARE | Facility: CLINIC | Age: 29
End: 2019-12-04

## 2019-12-04 NOTE — PROGRESS NOTES
DATE: 19   RE: Andrew Ohms   : 1990  DAVID: Estimated Date of Delivery: 1/10/20  EGA:  34w4d  OBGYN: Tati          Blood Sugars reported e-mail  Current regimen:  Levemir-30 units before breakfast at 8 or 9 AM  2100 calorie GDM diet with 3 meals & 3 snacks  Self-Blood Glucose Monitoring with a One-Touch Verio glucose meter  Per her diet recall she is regularly having english muffin egg sandwiches with egg, cheese and soto and 4 oz milk for breakfast, snacks include chobani greek yogurts, crackers and cheese, or apples and peanut butter, lunch or dinner is chicken, steak, or burger (no bun) with small baked potato  She is also having mixed greens and cucumbers and carrots with hummus  Overall balanced meals  She is not always having 30 gm of carbohydrate at lunch and dinner, advised patient to have at least 30 gm of carbohydrate at meals and at least 15 gms of carbohydrate at all snacks  Plan:  Levemir-Increase from 30 to 34 units to decrease after meal BG  Continue diet & SMBG  Note: pt reports she has been very stressed since finding out about her GDM diagnosis, and feels that that is contributing to her elevations     12/3/19 US: AC-93% (has increased), EFW-59% and LARISSA normal    Date to report next:   Monday, 19    Jason Babcock, RD, LDN, CDE  Diabetes Educator  Diabetes [facility] Pregnancy Program

## 2019-12-05 ENCOUNTER — ROUTINE PRENATAL (OUTPATIENT)
Dept: PERINATAL CARE | Facility: OTHER | Age: 29
End: 2019-12-05
Payer: COMMERCIAL

## 2019-12-05 VITALS
SYSTOLIC BLOOD PRESSURE: 103 MMHG | HEIGHT: 65 IN | BODY MASS INDEX: 28.19 KG/M2 | WEIGHT: 169.2 LBS | HEART RATE: 89 BPM | DIASTOLIC BLOOD PRESSURE: 71 MMHG

## 2019-12-05 DIAGNOSIS — O24.414 INSULIN CONTROLLED GESTATIONAL DIABETES MELLITUS (GDM) IN THIRD TRIMESTER: ICD-10-CM

## 2019-12-05 DIAGNOSIS — Z3A.34 34 WEEKS GESTATION OF PREGNANCY: ICD-10-CM

## 2019-12-05 PROCEDURE — 59025 FETAL NON-STRESS TEST: CPT | Performed by: OBSTETRICS & GYNECOLOGY

## 2019-12-05 NOTE — PROGRESS NOTES
Reactive NST at 34 6/7 weeks with [email or web address]      Plan:    Twice a week NSTs    Daily fetal movement counts    Aakash Burrell MD

## 2019-12-10 ENCOUNTER — ULTRASOUND (OUTPATIENT)
Dept: PERINATAL CARE | Facility: OTHER | Age: 29
End: 2019-12-10
Payer: COMMERCIAL

## 2019-12-10 VITALS
SYSTOLIC BLOOD PRESSURE: 100 MMHG | BODY MASS INDEX: 28.66 KG/M2 | WEIGHT: 172 LBS | HEART RATE: 90 BPM | HEIGHT: 65 IN | DIASTOLIC BLOOD PRESSURE: 70 MMHG

## 2019-12-10 DIAGNOSIS — O24.414 INSULIN CONTROLLED GESTATIONAL DIABETES MELLITUS (GDM) IN THIRD TRIMESTER: ICD-10-CM

## 2019-12-10 DIAGNOSIS — Z3A.35 35 WEEKS GESTATION OF PREGNANCY: ICD-10-CM

## 2019-12-10 PROCEDURE — 76815 OB US LIMITED FETUS(S): CPT | Performed by: OBSTETRICS & GYNECOLOGY

## 2019-12-10 PROCEDURE — 59025 FETAL NON-STRESS TEST: CPT | Performed by: OBSTETRICS & GYNECOLOGY

## 2019-12-12 ENCOUNTER — TELEPHONE (OUTPATIENT)
Dept: PERINATAL CARE | Facility: CLINIC | Age: 29
End: 2019-12-12

## 2019-12-12 ENCOUNTER — ROUTINE PRENATAL (OUTPATIENT)
Dept: OBGYN CLINIC | Facility: MEDICAL CENTER | Age: 29
End: 2019-12-12
Payer: COMMERCIAL

## 2019-12-12 VITALS — SYSTOLIC BLOOD PRESSURE: 100 MMHG | DIASTOLIC BLOOD PRESSURE: 70 MMHG | HEART RATE: 79 BPM

## 2019-12-12 VITALS — WEIGHT: 174 LBS | SYSTOLIC BLOOD PRESSURE: 100 MMHG | DIASTOLIC BLOOD PRESSURE: 70 MMHG | BODY MASS INDEX: 28.96 KG/M2

## 2019-12-12 DIAGNOSIS — O24.414 INSULIN CONTROLLED GESTATIONAL DIABETES MELLITUS (GDM) IN THIRD TRIMESTER: ICD-10-CM

## 2019-12-12 DIAGNOSIS — Z3A.35 35 WEEKS GESTATION OF PREGNANCY: Primary | ICD-10-CM

## 2019-12-12 DIAGNOSIS — Z34.93 THIRD TRIMESTER PREGNANCY: ICD-10-CM

## 2019-12-12 DIAGNOSIS — Z3A.35 35 WEEKS GESTATION OF PREGNANCY: ICD-10-CM

## 2019-12-12 LAB
SL AMB  POCT GLUCOSE, UA: NEGATIVE
SL AMB POCT URINE PROTEIN: NEGATIVE

## 2019-12-12 PROCEDURE — 81002 URINALYSIS NONAUTO W/O SCOPE: CPT | Performed by: OBSTETRICS & GYNECOLOGY

## 2019-12-12 PROCEDURE — 87653 STREP B DNA AMP PROBE: CPT | Performed by: OBSTETRICS & GYNECOLOGY

## 2019-12-12 PROCEDURE — PNV: Performed by: OBSTETRICS & GYNECOLOGY

## 2019-12-12 PROCEDURE — 59025 FETAL NON-STRESS TEST: CPT | Performed by: OBSTETRICS & GYNECOLOGY

## 2019-12-12 NOTE — TELEPHONE ENCOUNTER
DATE: 19   RE: Marleny Borrero   : 1990  DAVID: Estimated Date of Delivery: 1/10/20  EGA:  35w6d  OBGYN: Tati    Patient emailed "Yesterday 2 hours after eating I had a blood sugar of 138  About an hour and a half after that (3 5 hour s after eating) I got sweaty, light headed, nauseous  Took blood sugar and in 1 5 hours it had gone from 138 to 52  I was sick for about an hour  My 2 hour tests are high but my levels are dropping quickly at times  Do you know why?"     I responded that there could be several reasons for the drop and with your blood sugar dropping to less than half of what it was at the rise I'm sure you felt poorly for awhile  Since the 2 hour after value was 138 the 1 hour after value would have been higher causing your body to produce possibly an increased amount of insulin  Were the carbohydrates at the meal 30-60 grams, were the carbohydrates a type you've eaten before and was there 3-4 protein servings at the meal? When did you have the snack, you mention timing of blood sugars after the meal but at what time was the snack and what did it consist of? Your body likely overproduced insulin to get the blood sugar rise back down so it will be important to eat about every 2-3 hours to minimize this  The additional basal (long action) insulin you're taking doesn't have a peak action so it wouldn't really cause this  It's likely related to type/quantity and timing of foods  I hope this is helpful please email me back providing info on types/amounts of foods eaten at the meal prior and snack after  Also did you treat the low blood sugar, if so with what/how much and did you test blood sugars after treatment if so what were they? Await patient response      Aden Barrientos RN BS CDE  Diabetes Educator  Diabetes and Pregnancy Program

## 2019-12-12 NOTE — PROGRESS NOTES
Assessment  30 y o   at 35w6d presenting for routine prenatal visit  Plan  Diagnoses and all orders for this visit:    35 weeks gestation of pregnancy  Third trimester pregnancy  -  labor precautions reviewed  - FKC encouraged  - GBS collected  - Advised on wrist splinting for carpal tunnel symptoms  - Return weekly    Insulin controlled gestational diabetes mellitus (GDM) in third trimester  - Attempted to reach diabetes educators while patient in office but unsuccessful  Message left with them  - Advised patient to decrease levemir dose to 30u daily again to prevent hypoglycemia  Advised that this may change again after she discusses it with the educators and my recommendation is only temporary until we get their advice  - Discussed larger AC and risk of shoulder dystocia  EFW appropriate  - Advised delivery indicated in 39th week  Will continue to review MFM recommendations and alter this plan as indicated        ____________________________________________________________        Subjective    Sharron Duarte is a 30 y o   at 35w6d who presents for routine prenatal visit  She is concerned about her diabetes management  Patient notes that she has had issues with post-meal elevations and is on Levemir that she takes at breakfast time  She was recently titrated from 30 to 34u daily to help optimize these approx 1wk ago  Her post-meal values however are going up instead of down  She also reports 1x hypoglycemia to the 50s, improved with snack  She provided her logs to me today, and I noted that in the week prior to changing, 5 of 18 recorded post-meal glucose readings were elevated  This week, 5 of 10 recorded post-meal glucose readings are elevated   She notes she was advised previously by the educators that her diet was likely causing inconsistent release of insulin, however the patient notes she is adhering to diet fairly well and that she notices no difference in glucose readings based on it      She's also upset as she notes there seems to be no "plan" for delivery for her  We discussed A2GDM delivery is indicated in 39th week, but this may be advised to be earlier if her glucose remains difficult to control  Also advised on scheduling protocol dictates IOL may not be scheduled >7d in advance, so final date will be determined within 1wk of delivery  She also reports some feet swelling (symmetric) and right hand numbness, especially in morning  She notes she has irregular "period type" cramping  Cannot time it specifically; irregular interval  Advised likely uterine irritability and ctxns, as same noted on NST  Denies loss of fluid, or vaginal bleeding  She feels regular fetal movements  Pregnancy Problems:  Patient Active Problem List   Diagnosis    Insulin controlled gestational diabetes mellitus (GDM) in third trimester    Insulin controlled gestational diabetes mellitus (GDM) in third trimester    35 weeks gestation of pregnancy         Objective  /70   Wt 78 9 kg (174 lb)   LMP 04/05/2019   BMI 28 96 kg/m²     NST: 130 BPM / moderate / +accels, reactive   Uterine Size: size equals dates   Pelvic Exam:     Dilation: Closed    Effacement: Thick    Station:  -3    Consistency: medium    Position: middle     Physical Exam:  Physical Exam   Constitutional: She appears well-developed and well-nourished  No distress  HENT:   Head: Normocephalic and atraumatic  Eyes: No scleral icterus  Pulmonary/Chest: Effort normal  No accessory muscle usage  No respiratory distress  Abdominal: She exhibits distension (gravid)  There is no tenderness  There is no rebound and no guarding  Genitourinary: There is no rash, tenderness or lesion on the right labia  There is no rash, tenderness or lesion on the left labia  Skin: Skin is warm and dry  No rash noted  No erythema  Psychiatric: She has a normal mood and affect   Her behavior is normal

## 2019-12-14 LAB — GP B STREP DNA SPEC QL NAA+PROBE: NORMAL

## 2019-12-17 ENCOUNTER — ROUTINE PRENATAL (OUTPATIENT)
Dept: OBGYN CLINIC | Facility: MEDICAL CENTER | Age: 29
End: 2019-12-17
Payer: COMMERCIAL

## 2019-12-17 ENCOUNTER — DOCUMENTATION (OUTPATIENT)
Dept: PERINATAL CARE | Facility: CLINIC | Age: 29
End: 2019-12-17

## 2019-12-17 VITALS — DIASTOLIC BLOOD PRESSURE: 70 MMHG | WEIGHT: 173 LBS | SYSTOLIC BLOOD PRESSURE: 100 MMHG | BODY MASS INDEX: 28.79 KG/M2

## 2019-12-17 VITALS — BODY MASS INDEX: 28.79 KG/M2 | SYSTOLIC BLOOD PRESSURE: 100 MMHG | WEIGHT: 173 LBS | DIASTOLIC BLOOD PRESSURE: 70 MMHG

## 2019-12-17 DIAGNOSIS — O24.414 INSULIN CONTROLLED GESTATIONAL DIABETES MELLITUS (GDM) IN THIRD TRIMESTER: ICD-10-CM

## 2019-12-17 DIAGNOSIS — Z3A.36 36 WEEKS GESTATION OF PREGNANCY: Primary | ICD-10-CM

## 2019-12-17 DIAGNOSIS — Z34.93 THIRD TRIMESTER PREGNANCY: ICD-10-CM

## 2019-12-17 PROCEDURE — PNV: Performed by: OBSTETRICS & GYNECOLOGY

## 2019-12-17 PROCEDURE — 59025 FETAL NON-STRESS TEST: CPT | Performed by: OBSTETRICS & GYNECOLOGY

## 2019-12-17 NOTE — PROGRESS NOTES
DATE: 19   RE: Judith Christianson   : 1990  DAVID: Estimated Date of Delivery: 1/10/20  EGA:  36w4d  OBGYN: Tati  Insulin controlled GDM in third trimester    Blood Sugars reported e-mail  Current regimen:  Levemir- 30 units before breakfast (started morning of Friday 11/15) Patient reported OB decreased insulin dose to 30 units on    2100 calorie GDM diet with 3 meals & 3 snacks  Self-Blood Glucose Monitoring with a One-Touch Verio glucose meter  Per her diet recall she is regularly having english muffin egg sandwiches with egg, cheese and soto and 4 oz milk for breakfast, snacks include chobani greek yogurts, crackers and cheese, or apples and peanut butter, lunch or dinner is chicken, steak, or burger (no bun) with small baked potato  She is also having mixed greens and cucumbers and carrots with hummus  Overall balanced meals  She is not always having 30 gm of carbohydrate at lunch and dinner, advised patient to have at least 30 gm of carbohydrate at meals and at least 15 gms of carbohydrate at all snacks  Plan:  Levemir- continue 30 units at 8:00 or 9:00 am taking at same time every day  May need bolus insulin by next blood sugar log report  Advised patient to include a balance of carbohydrate and protein in all meals and snacks  If there are no exercise restrictions from OB, walk 20-30 minutes daily  Continue SMBG  Note: pt reports she has been very stressed since finding out about her GDM diagnosis, and feels that that is contributing to her elevations  12/3 US: fetal growth and LARISSA normal  12/10 LARISSA normal  Next US scheduled for 19 A1c 4 9% due to repeat on     Date to report next:   Monday, , sooner if blood sugars are consistently above target range         Mark Patton, RD,LDN,CDE  Diabetes Educator  [facility]

## 2019-12-17 NOTE — PROGRESS NOTES
DATE: 19   RE: Cyndi Amaya   : 1990  DAVID: Estimated Date of Delivery: 1/10/20  EGA:  36w4d  OBGYN: Tati  Insulin controlled GDM in third trimester    Blood Sugars reported e-mail  Current regimen:  Levemir- 25 units before breakfast (started morning of Friday 11/15)  2100 calorie GDM diet with 3 meals & 3 snacks  Self-Blood Glucose Monitoring with a One-Touch Verio glucose meter  Per her diet recall she is regularly having english muffin egg sandwiches with egg, cheese and soto and 4 oz milk for breakfast, snacks include chobani greek yogurts, crackers and cheese, or apples and peanut butter, lunch or dinner is chicken, steak, or burger (no bun) with small baked potato  She is also having mixed greens and cucumbers and carrots with hummus  Overall balanced meals  She is not always having 30 gm of carbohydrate at lunch and dinner, advised patient to have at least 30 gm of carbohydrate at meals and at least 15 gms of carbohydrate at all snacks  Plan:  Levemir- Increase from 25 units to 30 units at 8:00 or 9:00 am taking at same time every day  May need bolus insulin by next blood sugar log report  Continue SMBG  Note: pt reports she has been very stressed since finding out about her GDM diagnosis, and feels that that is contributing to her elevations        US: fetal growth and LARISSA normal   US: LARISSA normal    Date to report next:   Wednesday,         Diabetes Educator  [facility]

## 2019-12-17 NOTE — PROGRESS NOTES
Assessment  30 y o   at 36w4d presenting for routine prenatal visit  Plan  Diagnoses and all orders for this visit:    36 weeks gestation of pregnancy  Third trimester pregnancy  - Labor precautions reviewed  - FKC encouraged  - Return weekly to delivery    Insulin controlled gestational diabetes mellitus (GDM) in third trimester  - Advised on findings thus far  - Task send to MFM to determine if repeat growth indicated; done 2wks prior, advised with close interval would request their input   - Concerned Re: growth, delivery timing, delivery route  All which were reviewed today  - Considering options and will revisit next week      ____________________________________________________________        Subjective    Katlin Meneses is a 30 y o   at 36w4d who presents for routine prenatal visit  She is without complaint  She feels irregular contractions, loss of fluid, or vaginal bleeding  She feels regular fetal movements  Insulin adjusted multiple times over last week; feels shes getting closer to controlled  Logs reviewed with her  Patient incredibly anxious regarding delivery  Received message this week via patient portal about timing and route  We reviewed again that at present, timing for delivery remains recommended at 39wks  Discussed this may move forward if glucose remains difficult to control  Will touch base with MFM regarding control/plan  She is worried about shoulder dystocia and whether she should elect for   AC 93%, but remainder of growth is 59% overall  We reviewed that by growth, her baby is not macrosomic and there is no strong recommendation to proceed with   That being said, we had previously discussed shoulder dystocia  She has risk factors, but understands risk factors alone is not a guarantee of the event, and that an appropriately sized baby is not a guarantee this won't happen  She remains frustrated with lack of certainty       We discussed risks of  -- bleeding, infection, injury to bowel/bladder/nerves/arteries/fetus  We discussed implications on future pregnancies in terms of scar tissue, risks of TOLAC, and morbidly adherent placentation  Discussed it is always her right to elect for , but medically there are no guidelines to direct her choice  Discussed a trial of labor is a reasonable approach, with consideration to avoid heroic efforts for  if a protracted labor curve is encountered  Patient will consider this discussion and return early next week after M appointment for further discussion  Pregnancy Problems:  Patient Active Problem List   Diagnosis    Insulin controlled gestational diabetes mellitus (GDM) in third trimester    Insulin controlled gestational diabetes mellitus (GDM) in third trimester    36 weeks gestation of pregnancy         Objective  /70   Wt 78 5 kg (173 lb)   LMP 2019   BMI 28 79 kg/m²     NST: 155 BPM / moderate / +accels, reactive   Uterine Size: size equals dates     Physical Exam:  Physical Exam   Constitutional: She appears well-developed and well-nourished  No distress  HENT:   Head: Normocephalic and atraumatic  Eyes: No scleral icterus  Pulmonary/Chest: Effort normal  No accessory muscle usage  No respiratory distress  Abdominal: She exhibits distension (gravid)  There is no tenderness  There is no rebound and no guarding  Skin: Skin is warm and dry  No rash noted  No erythema  Psychiatric: She has a normal mood and affect   Her behavior is normal

## 2019-12-19 ENCOUNTER — ULTRASOUND (OUTPATIENT)
Dept: PERINATAL CARE | Facility: OTHER | Age: 29
End: 2019-12-19
Payer: COMMERCIAL

## 2019-12-19 VITALS
HEART RATE: 96 BPM | HEIGHT: 65 IN | SYSTOLIC BLOOD PRESSURE: 122 MMHG | WEIGHT: 171.4 LBS | BODY MASS INDEX: 28.56 KG/M2 | DIASTOLIC BLOOD PRESSURE: 70 MMHG

## 2019-12-19 DIAGNOSIS — O24.414 INSULIN CONTROLLED GESTATIONAL DIABETES MELLITUS (GDM) IN THIRD TRIMESTER: Primary | ICD-10-CM

## 2019-12-19 DIAGNOSIS — Z3A.36 36 WEEKS GESTATION OF PREGNANCY: ICD-10-CM

## 2019-12-19 DIAGNOSIS — Z3A.35 35 WEEKS GESTATION OF PREGNANCY: ICD-10-CM

## 2019-12-19 DIAGNOSIS — O24.414 INSULIN CONTROLLED GESTATIONAL DIABETES MELLITUS (GDM) IN THIRD TRIMESTER: ICD-10-CM

## 2019-12-19 PROCEDURE — 76816 OB US FOLLOW-UP PER FETUS: CPT | Performed by: OBSTETRICS & GYNECOLOGY

## 2019-12-19 PROCEDURE — 99213 OFFICE O/P EST LOW 20 MIN: CPT | Performed by: OBSTETRICS & GYNECOLOGY

## 2019-12-19 PROCEDURE — 59025 FETAL NON-STRESS TEST: CPT | Performed by: OBSTETRICS & GYNECOLOGY

## 2019-12-19 NOTE — PROGRESS NOTES
Dr Eden Mckeon called regarding patient's glucose readings and recommends bolus insulin be started before dinner secondary to 2 hours PP>120  Patient is agreeable to plan, Dr Eden Mckeon informed patient to inject 15 minutes before dinner and to flag bolus insulin pen in order not to confuse with basal insulin  Script sent to pharmacy  Pending next glucose log for adjustments

## 2019-12-20 ENCOUNTER — ROUTINE PRENATAL (OUTPATIENT)
Dept: OBGYN CLINIC | Facility: CLINIC | Age: 29
End: 2019-12-20

## 2019-12-20 ENCOUNTER — TELEPHONE (OUTPATIENT)
Dept: OBGYN CLINIC | Facility: MEDICAL CENTER | Age: 29
End: 2019-12-20

## 2019-12-20 DIAGNOSIS — Z3A.37 37 WEEKS GESTATION OF PREGNANCY: Primary | ICD-10-CM

## 2019-12-20 DIAGNOSIS — R42 ORTHOSTATIC LIGHTHEADEDNESS: ICD-10-CM

## 2019-12-20 PROCEDURE — PNV: Performed by: OBSTETRICS & GYNECOLOGY

## 2019-12-20 NOTE — PROGRESS NOTES
Janet Viera is a 30y o  year old  at 37w0d for routine prenatal visit    + FM, no vaginal bleeding, contractions, or LOF  Complaints: Yes complains of lightheadedness, dizziness, and diaphoresis with standing  States it   Occurred around 1100 and started feeling better after an hour  Recently her insulin regimen changed after having elevated postprandial values  All values were normal, during symptoms her sugar was 82  Does not think this is related to her symptoms  Denies associated symptoms of nausea,   Vomiting, diarrhea, fever, chills  Continue diet and monitor sugars  Discussed possibility of orthostatic hypotension or viral etiology     Call if symptoms persist or worsen

## 2019-12-23 ENCOUNTER — ULTRASOUND (OUTPATIENT)
Dept: PERINATAL CARE | Facility: OTHER | Age: 29
End: 2019-12-23
Payer: COMMERCIAL

## 2019-12-23 VITALS
WEIGHT: 174 LBS | SYSTOLIC BLOOD PRESSURE: 110 MMHG | HEART RATE: 86 BPM | HEIGHT: 65 IN | BODY MASS INDEX: 28.99 KG/M2 | DIASTOLIC BLOOD PRESSURE: 68 MMHG

## 2019-12-23 DIAGNOSIS — Z3A.37 37 WEEKS GESTATION OF PREGNANCY: ICD-10-CM

## 2019-12-23 DIAGNOSIS — O24.414 INSULIN CONTROLLED GESTATIONAL DIABETES MELLITUS (GDM) IN THIRD TRIMESTER: Primary | ICD-10-CM

## 2019-12-23 PROCEDURE — 76815 OB US LIMITED FETUS(S): CPT | Performed by: OBSTETRICS & GYNECOLOGY

## 2019-12-23 PROCEDURE — 59025 FETAL NON-STRESS TEST: CPT | Performed by: OBSTETRICS & GYNECOLOGY

## 2019-12-23 NOTE — PROGRESS NOTES
[address]: Ms Marisela Pandey was seen today at 37w3d for NST (found under the pregnancy episode) which I reviewed the RN assessment and agree, and LARISSA (see ultrasound report under OB procedures tab)  Please don't hesitate to contact our office with any concerns or questions    Cathryn Platt MD

## 2019-12-26 ENCOUNTER — ROUTINE PRENATAL (OUTPATIENT)
Dept: OBGYN CLINIC | Facility: MEDICAL CENTER | Age: 29
End: 2019-12-26
Payer: COMMERCIAL

## 2019-12-26 VITALS
WEIGHT: 175 LBS | BODY MASS INDEX: 29.12 KG/M2 | HEART RATE: 86 BPM | SYSTOLIC BLOOD PRESSURE: 110 MMHG | DIASTOLIC BLOOD PRESSURE: 70 MMHG

## 2019-12-26 VITALS — DIASTOLIC BLOOD PRESSURE: 70 MMHG | BODY MASS INDEX: 29.12 KG/M2 | WEIGHT: 175 LBS | SYSTOLIC BLOOD PRESSURE: 110 MMHG

## 2019-12-26 DIAGNOSIS — O24.414 INSULIN CONTROLLED GESTATIONAL DIABETES MELLITUS (GDM) IN THIRD TRIMESTER: ICD-10-CM

## 2019-12-26 DIAGNOSIS — Z3A.37 37 WEEKS GESTATION OF PREGNANCY: ICD-10-CM

## 2019-12-26 DIAGNOSIS — Z34.93 THIRD TRIMESTER PREGNANCY: ICD-10-CM

## 2019-12-26 DIAGNOSIS — Z3A.37 37 WEEKS GESTATION OF PREGNANCY: Primary | ICD-10-CM

## 2019-12-26 PROCEDURE — PNV: Performed by: OBSTETRICS & GYNECOLOGY

## 2019-12-26 PROCEDURE — 59025 FETAL NON-STRESS TEST: CPT | Performed by: OBSTETRICS & GYNECOLOGY

## 2019-12-26 NOTE — PROGRESS NOTES
Assessment  30 y o   at 37w6d presenting for routine prenatal visit  Plan  Diagnoses and all orders for this visit:    37 weeks gestation of pregnancy  Third trimester pregnancy  - Labor precautions reviewed  - [address] encouraged  - Task sent for IOL scheduling  - Return in 1wk for PN/NST    Insulin controlled gestational diabetes mellitus (GDM) in third trimester  - Continue glucose monitoring and reporting  - APFS ongoing    ____________________________________________________________        Subjective    Masha Hooper is a 30 y o  Nick Forward at 37w6d who presents for routine prenatal visit  She is without complaint  She has cramping, denies regular contractions, loss of fluid, or vaginal bleeding  She feels regular fetal movements  Notes shes had a tough week with glucose checks; indulged a little at holiday parties, also forgot new PM insulin twice this week  We revisited discussion of IOL vs c/s, which she feels more comfortable with IOL based on last growth assessment  Will schedule in 39th week as indicated  Pregnancy Problems:  Patient Active Problem List   Diagnosis    Insulin controlled gestational diabetes mellitus (GDM) in third trimester    37 weeks gestation of pregnancy         Objective  /70   Wt 79 4 kg (175 lb)   LMP 2019   BMI 29 12 kg/m²     NST: 130 BPM / moderate / +accels, reactive   Uterine Size: size equals dates   Presentations: cephalic   Pelvic Exam:     Dilation: Closed    Effacement: Thick    Station:  -3    Consistency: medium    Position: middle     Physical Exam:  Physical Exam   Constitutional: She appears well-developed and well-nourished  No distress  HENT:   Head: Normocephalic and atraumatic  Eyes: No scleral icterus  Pulmonary/Chest: Effort normal  No accessory muscle usage  No respiratory distress  Abdominal: She exhibits distension (gravid)  There is no tenderness  There is no rebound and no guarding  Skin: Skin is warm and dry   No rash noted  No erythema  Psychiatric: She has a normal mood and affect   Her behavior is normal

## 2019-12-30 ENCOUNTER — TELEPHONE (OUTPATIENT)
Dept: OBGYN CLINIC | Facility: MEDICAL CENTER | Age: 29
End: 2019-12-30

## 2019-12-30 NOTE — TELEPHONE ENCOUNTER
----- Message from Hayley Barahona sent at 12/30/2019  1:03 PM EST -----  Regarding: RE: Schedule IOL  Pt is aware   ----- Message -----  From: Hayley Barahona  Sent: 12/30/2019  11:28 AM EST  To: Hayley Barahona  Subject: RE: Schedule IOL                                 Pt is scheduled for 1/5/20 at 8pm   ----- Message -----  From: Sabina Velasco MD  Sent: 12/26/2019   2:32 PM EST  To: Hayley Barahona  Subject: Schedule IOL                                     Please schedule IOL for Archana for A2GDM  Aiming for the evening of Sunday 1/5/20 for cervical ripening

## 2019-12-31 ENCOUNTER — DOCUMENTATION (OUTPATIENT)
Dept: PERINATAL CARE | Facility: CLINIC | Age: 29
End: 2019-12-31

## 2019-12-31 ENCOUNTER — ULTRASOUND (OUTPATIENT)
Dept: PERINATAL CARE | Facility: CLINIC | Age: 29
End: 2019-12-31
Payer: COMMERCIAL

## 2019-12-31 VITALS
HEART RATE: 80 BPM | WEIGHT: 175 LBS | DIASTOLIC BLOOD PRESSURE: 73 MMHG | HEIGHT: 65 IN | SYSTOLIC BLOOD PRESSURE: 114 MMHG | BODY MASS INDEX: 29.16 KG/M2

## 2019-12-31 DIAGNOSIS — O24.414 INSULIN CONTROLLED GESTATIONAL DIABETES MELLITUS (GDM) IN THIRD TRIMESTER: ICD-10-CM

## 2019-12-31 DIAGNOSIS — Z3A.38 38 WEEKS GESTATION OF PREGNANCY: ICD-10-CM

## 2019-12-31 DIAGNOSIS — O24.414 INSULIN CONTROLLED GESTATIONAL DIABETES MELLITUS (GDM) IN THIRD TRIMESTER: Primary | ICD-10-CM

## 2019-12-31 DIAGNOSIS — Z3A.31 31 WEEKS GESTATION OF PREGNANCY: ICD-10-CM

## 2019-12-31 PROCEDURE — 59025 FETAL NON-STRESS TEST: CPT | Performed by: OBSTETRICS & GYNECOLOGY

## 2019-12-31 PROCEDURE — 76815 OB US LIMITED FETUS(S): CPT | Performed by: OBSTETRICS & GYNECOLOGY

## 2019-12-31 RX ORDER — INSULIN DETEMIR 100 [IU]/ML
INJECTION, SOLUTION SUBCUTANEOUS
Qty: 3 ML | Refills: 0 | Status: SHIPPED | OUTPATIENT
Start: 2019-12-31 | End: 2020-01-02 | Stop reason: SDUPTHER

## 2019-12-31 NOTE — PROGRESS NOTES
[address]: Ms Marisela Pandey was seen today at 38w4d for NST (found under the pregnancy episode) which I reviewed the RN assessment and agree, and LARISSA (see ultrasound report under OB procedures tab)  Please don't hesitate to contact our office with any concerns or questions    Cathryn Platt MD

## 2019-12-31 NOTE — PROGRESS NOTES
DATE: 19   RE: Lee Harris   : 1990  DAVID: Estimated Date of Delivery: 1/10/20  EGA:  38w4d  OBGYN: Tati  Insulin controlled GDM in third trimester    Refer to [facility] download for glucose readings  Current regimen:  Levemir- 30 units at 8 AM daily  Novolog 4 units before dinner and reports missing dose sometimes  2100 calorie GDM diet with 3 meals and 3 snacks including combination of carbohydrates, protein and fat per meal/snack  Self-monitoring blood glucose with a One-Touch Verio glucose meter  Per her diet recall she is regularly having english muffin egg sandwiches with egg, cheese and soto and 4 oz milk for breakfast, snacks include chobani greek yogurts, crackers and cheese, or apples and peanut butter, lunch or dinner is chicken, steak, or burger (no bun) with small baked potato  She is also having mixed greens and cucumbers and carrots with hummus  Overall balanced meals  She is not always having 30 gm of carbohydrate at lunch and dinner, advised patient to have at least 30 gm of carbohydrate at meals and at least 15 gms of carbohydrate at all snacks  Plan:  Continue Levemir 30 units at 8 AM daily  Reports OBGYN instructed patient to continue with current dose of Levemir for upcoming induction  Due to 2 hours PP>120, increase Novolog from 4 to 5 units before dinner  Continue GDM diet and testing glucose  19 US: fetal growth appeared normal and LARISSA normal   19 A1c 4 9%  Script for Levemir pen sent to pharmacy  Date to report next:   None, being induced         EMMY Mak, CDE  Diabetes Educator  [facility]

## 2020-01-02 ENCOUNTER — ROUTINE PRENATAL (OUTPATIENT)
Dept: OBGYN CLINIC | Facility: MEDICAL CENTER | Age: 30
End: 2020-01-02
Payer: COMMERCIAL

## 2020-01-02 VITALS — BODY MASS INDEX: 29.02 KG/M2 | SYSTOLIC BLOOD PRESSURE: 106 MMHG | WEIGHT: 174.4 LBS | DIASTOLIC BLOOD PRESSURE: 68 MMHG

## 2020-01-02 DIAGNOSIS — O24.414 INSULIN CONTROLLED GESTATIONAL DIABETES MELLITUS (GDM) IN THIRD TRIMESTER: ICD-10-CM

## 2020-01-02 DIAGNOSIS — Z3A.31 31 WEEKS GESTATION OF PREGNANCY: ICD-10-CM

## 2020-01-02 DIAGNOSIS — O24.414 INSULIN CONTROLLED GESTATIONAL DIABETES MELLITUS (GDM) IN THIRD TRIMESTER: Primary | ICD-10-CM

## 2020-01-02 PROCEDURE — 59025 FETAL NON-STRESS TEST: CPT | Performed by: OBSTETRICS & GYNECOLOGY

## 2020-01-02 PROCEDURE — PNV: Performed by: OBSTETRICS & GYNECOLOGY

## 2020-01-02 RX ORDER — INSULIN DETEMIR 100 [IU]/ML
INJECTION, SOLUTION SUBCUTANEOUS
Qty: 3 ML | Refills: 0 | Status: SHIPPED | OUTPATIENT
Start: 2020-01-02 | End: 2020-01-03 | Stop reason: SDUPTHER

## 2020-01-02 NOTE — PROGRESS NOTES
Nathaniel Hansen is a 30y o  year old  at 38w7d for routine prenatal visit    + FM, no vaginal bleeding, contractions, or LOF  Complaints: No - A2GDM - logs not brought in , states fairly well controlled on current regimen , IOL scheduled for  @ 8 pm   Consent signed at todays visit   Most recent ultrasound and labs reviewed    [address] and Labor precautions reviewed

## 2020-01-03 ENCOUNTER — TELEPHONE (OUTPATIENT)
Dept: PERINATAL CARE | Facility: CLINIC | Age: 30
End: 2020-01-03

## 2020-01-03 DIAGNOSIS — Z3A.31 31 WEEKS GESTATION OF PREGNANCY: ICD-10-CM

## 2020-01-03 DIAGNOSIS — O24.414 INSULIN CONTROLLED GESTATIONAL DIABETES MELLITUS (GDM) IN THIRD TRIMESTER: ICD-10-CM

## 2020-01-03 RX ORDER — INSULIN DETEMIR 100 [IU]/ML
INJECTION, SOLUTION SUBCUTANEOUS
Qty: 3 ML | Refills: 0 | Status: SHIPPED | OUTPATIENT
Start: 2020-01-03 | End: 2020-01-08 | Stop reason: HOSPADM

## 2020-01-03 NOTE — TELEPHONE ENCOUNTER
Phone call to [address] [place] [address], RN regarding patient will be one day short of Levemir and if induction can be moved from Sunday to Saturday  Pending return phone call

## 2020-01-05 ENCOUNTER — HOSPITAL ENCOUNTER (INPATIENT)
Facility: HOSPITAL | Age: 30
LOS: 3 days | Discharge: HOME | End: 2020-01-08
Attending: OBSTETRICS & GYNECOLOGY | Admitting: OBSTETRICS & GYNECOLOGY
Payer: COMMERCIAL

## 2020-01-05 ENCOUNTER — HOSPITAL ENCOUNTER (OUTPATIENT)
Dept: LABOR AND DELIVERY | Facility: HOSPITAL | Age: 30
Discharge: HOME | End: 2020-01-05
Payer: COMMERCIAL

## 2020-01-05 DIAGNOSIS — Z3A.39 39 WEEKS GESTATION OF PREGNANCY: ICD-10-CM

## 2020-01-05 DIAGNOSIS — Z37.9 VACUUM-ASSISTED VAGINAL DELIVERY: Primary | ICD-10-CM

## 2020-01-05 LAB
ABO GROUP BLD: NORMAL
BASOPHILS # BLD AUTO: 0.02 THOUSANDS/ΜL (ref 0–0.1)
BASOPHILS NFR BLD AUTO: 0 % (ref 0–1)
BLD GP AB SCN SERPL QL: NEGATIVE
EOSINOPHIL # BLD AUTO: 0.08 THOUSAND/ΜL (ref 0–0.61)
EOSINOPHIL NFR BLD AUTO: 1 % (ref 0–6)
ERYTHROCYTE [DISTWIDTH] IN BLOOD BY AUTOMATED COUNT: 12.9 % (ref 11.6–15.1)
HCT VFR BLD AUTO: 39 % (ref 34.8–46.1)
HGB BLD-MCNC: 13 G/DL (ref 11.5–15.4)
IMM GRANULOCYTES # BLD AUTO: 0.07 THOUSAND/UL (ref 0–0.2)
IMM GRANULOCYTES NFR BLD AUTO: 1 % (ref 0–2)
LYMPHOCYTES # BLD AUTO: 1.62 THOUSANDS/ΜL (ref 0.6–4.47)
LYMPHOCYTES NFR BLD AUTO: 17 % (ref 14–44)
MCH RBC QN AUTO: 33.6 PG (ref 26.8–34.3)
MCHC RBC AUTO-ENTMCNC: 33.3 G/DL (ref 31.4–37.4)
MCV RBC AUTO: 101 FL (ref 82–98)
MONOCYTES # BLD AUTO: 0.81 THOUSAND/ΜL (ref 0.17–1.22)
MONOCYTES NFR BLD AUTO: 9 % (ref 4–12)
NEUTROPHILS # BLD AUTO: 6.86 THOUSANDS/ΜL (ref 1.85–7.62)
NEUTS SEG NFR BLD AUTO: 72 % (ref 43–75)
NRBC BLD AUTO-RTO: 0 /100 WBCS
PLATELET # BLD AUTO: 211 THOUSANDS/UL (ref 149–390)
PMV BLD AUTO: 11 FL (ref 8.9–12.7)
RBC # BLD AUTO: 3.87 MILLION/UL (ref 3.81–5.12)
RH BLD: POSITIVE
SPECIMEN EXPIRATION DATE: NORMAL
WBC # BLD AUTO: 9.46 THOUSAND/UL (ref 4.31–10.16)

## 2020-01-05 PROCEDURE — 3E0P7VZ INTRODUCTION OF HORMONE INTO FEMALE REPRODUCTIVE, VIA NATURAL OR ARTIFICIAL OPENING: ICD-10-PCS | Performed by: OBSTETRICS & GYNECOLOGY

## 2020-01-05 PROCEDURE — 86592 SYPHILIS TEST NON-TREP QUAL: CPT | Performed by: OBSTETRICS & GYNECOLOGY

## 2020-01-05 PROCEDURE — 86901 BLOOD TYPING SEROLOGIC RH(D): CPT | Performed by: OBSTETRICS & GYNECOLOGY

## 2020-01-05 PROCEDURE — 86850 RBC ANTIBODY SCREEN: CPT | Performed by: OBSTETRICS & GYNECOLOGY

## 2020-01-05 PROCEDURE — 99024 POSTOP FOLLOW-UP VISIT: CPT | Performed by: OBSTETRICS & GYNECOLOGY

## 2020-01-05 PROCEDURE — 85025 COMPLETE CBC W/AUTO DIFF WBC: CPT | Performed by: OBSTETRICS & GYNECOLOGY

## 2020-01-05 PROCEDURE — 4A1HXCZ MONITORING OF PRODUCTS OF CONCEPTION, CARDIAC RATE, EXTERNAL APPROACH: ICD-10-PCS | Performed by: OBSTETRICS & GYNECOLOGY

## 2020-01-05 PROCEDURE — 86900 BLOOD TYPING SEROLOGIC ABO: CPT | Performed by: OBSTETRICS & GYNECOLOGY

## 2020-01-05 PROCEDURE — 82948 REAGENT STRIP/BLOOD GLUCOSE: CPT

## 2020-01-05 RX ORDER — SODIUM CHLORIDE, SODIUM LACTATE, POTASSIUM CHLORIDE, CALCIUM CHLORIDE 600; 310; 30; 20 MG/100ML; MG/100ML; MG/100ML; MG/100ML
125 INJECTION, SOLUTION INTRAVENOUS CONTINUOUS
Status: DISCONTINUED | OUTPATIENT
Start: 2020-01-05 | End: 2020-01-05

## 2020-01-05 RX ORDER — SODIUM CHLORIDE 9 MG/ML
125 INJECTION, SOLUTION INTRAVENOUS CONTINUOUS
Status: DISCONTINUED | OUTPATIENT
Start: 2020-01-05 | End: 2020-01-06

## 2020-01-05 RX ADMIN — SODIUM CHLORIDE 125 ML/HR: 0.9 INJECTION, SOLUTION INTRAVENOUS at 21:02

## 2020-01-05 RX ADMIN — MISOPROSTOL 25 MCG: 100 TABLET ORAL at 21:32

## 2020-01-06 ENCOUNTER — ANESTHESIA EVENT (INPATIENT)
Dept: ANESTHESIOLOGY | Facility: HOSPITAL | Age: 30
End: 2020-01-06
Payer: COMMERCIAL

## 2020-01-06 ENCOUNTER — ANESTHESIA (INPATIENT)
Dept: ANESTHESIOLOGY | Facility: HOSPITAL | Age: 30
End: 2020-01-06
Payer: COMMERCIAL

## 2020-01-06 PROBLEM — Z37.9 VACUUM-ASSISTED VAGINAL DELIVERY: Status: ACTIVE | Noted: 2020-01-06

## 2020-01-06 LAB
BASE EXCESS BLDCOA CALC-SCNC: -13.1 MMOL/L (ref 3–11)
BASE EXCESS BLDCOV CALC-SCNC: -10.8 MMOL/L (ref 1–9)
GLUCOSE SERPL-MCNC: 117 MG/DL (ref 65–140)
GLUCOSE SERPL-MCNC: 63 MG/DL (ref 65–140)
GLUCOSE SERPL-MCNC: 64 MG/DL (ref 65–140)
GLUCOSE SERPL-MCNC: 66 MG/DL (ref 65–140)
GLUCOSE SERPL-MCNC: 72 MG/DL (ref 65–140)
GLUCOSE SERPL-MCNC: 73 MG/DL (ref 65–140)
GLUCOSE SERPL-MCNC: 75 MG/DL (ref 65–140)
GLUCOSE SERPL-MCNC: 76 MG/DL (ref 65–140)
GLUCOSE SERPL-MCNC: 77 MG/DL (ref 65–140)
GLUCOSE SERPL-MCNC: 83 MG/DL (ref 65–140)
GLUCOSE SERPL-MCNC: 85 MG/DL (ref 65–140)
GLUCOSE SERPL-MCNC: 92 MG/DL (ref 65–140)
HCO3 BLDCOA-SCNC: 17.5 MMOL/L (ref 17.3–27.3)
HCO3 BLDCOV-SCNC: 18.4 MMOL/L (ref 12.2–28.6)
O2 CT VFR BLDCOA CALC: 3.3 ML/DL
OXYHGB MFR BLDCOA: 17.7 %
OXYHGB MFR BLDCOV: 33.5 %
PCO2 BLDCOA: 61.4 MM[HG] (ref 30–60)
PCO2 BLDCOV: 54.2 MM HG (ref 27–43)
PH BLDCOA: 7.07 [PH] (ref 7.23–7.43)
PH BLDCOV: 7.15 [PH] (ref 7.19–7.49)
PO2 BLDCOA: 15.3 MM HG (ref 5–25)
PO2 BLDCOV: 18.9 MM HG (ref 15–45)
RPR SER QL: NORMAL
SAO2 % BLDCOV: 6.5 ML/DL

## 2020-01-06 PROCEDURE — 82805 BLOOD GASES W/O2 SATURATION: CPT | Performed by: OBSTETRICS & GYNECOLOGY

## 2020-01-06 PROCEDURE — 10907ZC DRAINAGE OF AMNIOTIC FLUID, THERAPEUTIC FROM PRODUCTS OF CONCEPTION, VIA NATURAL OR ARTIFICIAL OPENING: ICD-10-PCS | Performed by: OBSTETRICS & GYNECOLOGY

## 2020-01-06 PROCEDURE — 59400 OBSTETRICAL CARE: CPT | Performed by: OBSTETRICS & GYNECOLOGY

## 2020-01-06 PROCEDURE — 0KQM0ZZ REPAIR PERINEUM MUSCLE, OPEN APPROACH: ICD-10-PCS | Performed by: OBSTETRICS & GYNECOLOGY

## 2020-01-06 PROCEDURE — 82948 REAGENT STRIP/BLOOD GLUCOSE: CPT

## 2020-01-06 PROCEDURE — 3E033VJ INTRODUCTION OF OTHER HORMONE INTO PERIPHERAL VEIN, PERCUTANEOUS APPROACH: ICD-10-PCS | Performed by: OBSTETRICS & GYNECOLOGY

## 2020-01-06 PROCEDURE — 10H07YZ INSERTION OF OTHER DEVICE INTO PRODUCTS OF CONCEPTION, VIA NATURAL OR ARTIFICIAL OPENING: ICD-10-PCS | Performed by: OBSTETRICS & GYNECOLOGY

## 2020-01-06 PROCEDURE — 99024 POSTOP FOLLOW-UP VISIT: CPT | Performed by: OBSTETRICS & GYNECOLOGY

## 2020-01-06 RX ORDER — ONDANSETRON 2 MG/ML
4 INJECTION INTRAMUSCULAR; INTRAVENOUS EVERY 8 HOURS PRN
Status: DISCONTINUED | OUTPATIENT
Start: 2020-01-06 | End: 2020-01-08 | Stop reason: HOSPADM

## 2020-01-06 RX ORDER — OXYTOCIN/RINGER'S LACTATE 30/500 ML
1-30 PLASTIC BAG, INJECTION (ML) INTRAVENOUS
Status: DISCONTINUED | OUTPATIENT
Start: 2020-01-06 | End: 2020-01-06

## 2020-01-06 RX ORDER — CALCIUM CARBONATE 200(500)MG
1000 TABLET,CHEWABLE ORAL DAILY PRN
Status: DISCONTINUED | OUTPATIENT
Start: 2020-01-06 | End: 2020-01-08 | Stop reason: HOSPADM

## 2020-01-06 RX ORDER — IBUPROFEN 600 MG/1
600 TABLET ORAL EVERY 6 HOURS PRN
Status: DISCONTINUED | OUTPATIENT
Start: 2020-01-06 | End: 2020-01-08 | Stop reason: HOSPADM

## 2020-01-06 RX ORDER — ONDANSETRON 2 MG/ML
4 INJECTION INTRAMUSCULAR; INTRAVENOUS EVERY 6 HOURS PRN
Status: DISCONTINUED | OUTPATIENT
Start: 2020-01-06 | End: 2020-01-07 | Stop reason: SDUPTHER

## 2020-01-06 RX ORDER — OXYTOCIN/RINGER'S LACTATE 30/500 ML
250 PLASTIC BAG, INJECTION (ML) INTRAVENOUS
Status: DISCONTINUED | OUTPATIENT
Start: 2020-01-06 | End: 2020-01-08 | Stop reason: HOSPADM

## 2020-01-06 RX ORDER — LIDOCAINE HYDROCHLORIDE AND EPINEPHRINE 15; 5 MG/ML; UG/ML
INJECTION, SOLUTION EPIDURAL AS NEEDED
Status: DISCONTINUED | OUTPATIENT
Start: 2020-01-06 | End: 2020-01-06 | Stop reason: SURG

## 2020-01-06 RX ORDER — ROPIVACAINE HYDROCHLORIDE 2 MG/ML
INJECTION, SOLUTION EPIDURAL; INFILTRATION; PERINEURAL AS NEEDED
Status: DISCONTINUED | OUTPATIENT
Start: 2020-01-06 | End: 2020-01-06 | Stop reason: SURG

## 2020-01-06 RX ORDER — SERTRALINE HYDROCHLORIDE 100 MG/1
100 TABLET, FILM COATED ORAL DAILY
Status: DISCONTINUED | OUTPATIENT
Start: 2020-01-06 | End: 2020-01-08 | Stop reason: HOSPADM

## 2020-01-06 RX ORDER — DIAPER,BRIEF,INFANT-TODD,DISP
1 EACH MISCELLANEOUS 4 TIMES DAILY PRN
Status: DISCONTINUED | OUTPATIENT
Start: 2020-01-06 | End: 2020-01-08 | Stop reason: HOSPADM

## 2020-01-06 RX ORDER — ACETAMINOPHEN 325 MG/1
650 TABLET ORAL EVERY 6 HOURS PRN
Status: DISCONTINUED | OUTPATIENT
Start: 2020-01-06 | End: 2020-01-08 | Stop reason: HOSPADM

## 2020-01-06 RX ORDER — EPHEDRINE SULFATE 50 MG/ML
INJECTION INTRAVENOUS AS NEEDED
Status: DISCONTINUED | OUTPATIENT
Start: 2020-01-06 | End: 2020-01-06 | Stop reason: SURG

## 2020-01-06 RX ORDER — SENNOSIDES 8.6 MG
1 TABLET ORAL DAILY
Status: DISCONTINUED | OUTPATIENT
Start: 2020-01-07 | End: 2020-01-08 | Stop reason: HOSPADM

## 2020-01-06 RX ADMIN — ONDANSETRON 4 MG: 2 INJECTION INTRAMUSCULAR; INTRAVENOUS at 06:19

## 2020-01-06 RX ADMIN — Medication 2 MILLI-UNITS/MIN: at 03:30

## 2020-01-06 RX ADMIN — BENZOCAINE AND LEVOMENTHOL: 200; 5 SPRAY TOPICAL at 20:51

## 2020-01-06 RX ADMIN — LIDOCAINE HYDROCHLORIDE AND EPINEPHRINE 3 ML: 15; 5 INJECTION, SOLUTION EPIDURAL at 05:24

## 2020-01-06 RX ADMIN — ROPIVACAINE HYDROCHLORIDE: 2 INJECTION, SOLUTION EPIDURAL; INFILTRATION at 05:35

## 2020-01-06 RX ADMIN — ROPIVACAINE HYDROCHLORIDE 5 ML: 2 INJECTION, SOLUTION EPIDURAL; INFILTRATION at 05:29

## 2020-01-06 RX ADMIN — IBUPROFEN 600 MG: 600 TABLET ORAL at 17:45

## 2020-01-06 RX ADMIN — EPHEDRINE SULFATE 12 MG: 50 INJECTION, SOLUTION INTRAVENOUS at 08:00

## 2020-01-06 RX ADMIN — SODIUM CHLORIDE 125 ML/HR: 0.9 INJECTION, SOLUTION INTRAVENOUS at 07:39

## 2020-01-06 RX ADMIN — ROPIVACAINE HYDROCHLORIDE: 2 INJECTION, SOLUTION EPIDURAL; INFILTRATION at 15:11

## 2020-01-06 RX ADMIN — SODIUM CHLORIDE 999 ML/HR: 0.9 INJECTION, SOLUTION INTRAVENOUS at 05:06

## 2020-01-06 RX ADMIN — SERTRALINE HYDROCHLORIDE 100 MG: 100 TABLET ORAL at 09:11

## 2020-01-06 RX ADMIN — SODIUM CHLORIDE 999 ML/HR: 0.9 INJECTION, SOLUTION INTRAVENOUS at 06:27

## 2020-01-06 RX ADMIN — SODIUM CHLORIDE 999 ML/HR: 0.9 INJECTION, SOLUTION INTRAVENOUS at 04:01

## 2020-01-06 RX ADMIN — ROPIVACAINE HYDROCHLORIDE 5 ML: 2 INJECTION, SOLUTION EPIDURAL; INFILTRATION at 05:27

## 2020-01-06 RX ADMIN — WITCH HAZEL 1 PAD: 500 SOLUTION RECTAL; TOPICAL at 20:51

## 2020-01-06 RX ADMIN — SODIUM CHLORIDE 125 ML/HR: 0.9 INJECTION, SOLUTION INTRAVENOUS at 08:58

## 2020-01-06 NOTE — ADDENDUM NOTE
Addendum  created 01/06/20 1710 by Leona Johnson DO    Intraprocedure Event edited, Intraprocedure LDAs edited, LDA properties accepted

## 2020-01-06 NOTE — OB LABOR/OXYTOCIN SAFETY PROGRESS
Oxytocin Safety Progress Check Note - Grecia Roca 30 y o  female MRN: [id number]    Unit/Bed#: L&D 325-01 Encounter: [id number]    Dose (citlaly-units/min) Oxytocin: 4 citlaly-units/min  Contraction Frequency (minutes): 1-5  Contraction Quality: Mild  Tachysystole: No   Dilation: 4        Effacement (%): 80  Station: -2  Baseline Rate: 135 bpm  Fetal Heart Rate: 135 BPM  FHR Category: Category I             Notes/comments:    To patient room due to discrepancy between [place] portable monitor and traditional external monitor  [place] monitor removed at this time  Small variable deceleration noted but otherwise FHT noted to be in the 150sbpm  Patient getting comfortable with epidural  Consider AROM and IUPC at next check    Dr Chowdhury Seek aware      Janice Peace MD 1/6/2020 6:06 AM

## 2020-01-06 NOTE — ANESTHESIA PROCEDURE NOTES
Epidural Block    Patient location during procedure: OB  Start time: 1/6/2020 5:24 AM  Reason for block: primary anesthetic  Staffing  Anesthesiologist: Sandy Landers DO  Performed: anesthesiologist   Preanesthetic Checklist  Completed: patient identified, site marked, surgical consent, pre-op evaluation, timeout performed, IV checked, risks and benefits discussed and monitors and equipment checked  Epidural  Patient position: sitting  Prep: Betadine  Patient monitoring: heart rate, continuous pulse ox and frequent blood pressure checks  Approach: midline  Location: lumbar (1-5)  Injection technique: FREDI saline  Needle  Needle type: Tuohy   Needle gauge: 18 G  Catheter type: side hole  Catheter size: 20 G  Test dose: negativenegative aspiration for CSF, negative aspiration for heme and no paresthesia on injection  patient tolerated the procedure well with no immediate complications

## 2020-01-06 NOTE — OB LABOR/OXYTOCIN SAFETY PROGRESS
Oxytocin Safety Progress Check Note - Yu Ser 30 y o  female MRN: [id number]    Unit/Bed#: L&D 325-01 Encounter: [id number]       Contraction Frequency (minutes): 3-4(IRREGULAR WITH IRRITABILITY, INVERTED)  Contraction Quality: Mild  Tachysystole: No   Dilation: 3        Effacement (%): 60  Station: -2  Baseline Rate: 135 bpm  Fetal Heart Rate: 135 BPM  FHR Category: Category I             Notes/comments:   Patient feeling "period like cramping"   SVE as above  Given that patient is issa regularly, will expectantly manage for two hours  If no change at next check, will start pitocin    D/w Dr Dolores Ndiaye MD 1/6/2020 12:49 AM

## 2020-01-06 NOTE — OB LABOR/OXYTOCIN SAFETY PROGRESS
Oxytocin Safety Progress Check Note - Prasanth Valencia 30 y o  female MRN: [id number]    Unit/Bed#: L&D 325-01 Encounter: [id number]    Dose (citlaly-units/min) Oxytocin: 2 citlaly-units/min  Contraction Frequency (minutes): 1-5  Contraction Quality: Mild  Tachysystole: No   Dilation: 3-4        Effacement (%): 60  Station: -2  Baseline Rate: 135 bpm  Fetal Heart Rate: 135 BPM  FHR Category: Category I             Notes/comments:   [place] monitor with artifact that looks like deceleration at 0431  SVE as above  Patient getting fluid bolus at this time in preparation for epidural  Will continue pitocin titration at this time    Dr Xiomy Arteaga MD 1/6/2020 4:37 AM

## 2020-01-06 NOTE — OB LABOR/OXYTOCIN SAFETY PROGRESS
Oxytocin Safety Progress Check Note - Eddie Morales 30 y o  female MRN: [id number]    Unit/Bed#: L&D 325-01 Encounter: [id number]       Contraction Frequency (minutes): 2-3(DIFFICULT TO TRACE DUE TO MATERNAL POSITION   PT STATES Q2-3 )  Contraction Quality: Mild  Tachysystole: No   Dilation: 3        Effacement (%): 60  Station: -2  Baseline Rate: 125 bpm  Fetal Heart Rate: 135 BPM  FHR Category: Category I             Notes/comments:   SVE unchanged after two hours  Will start pitocin shortly  Patient undecided about what she wouldl aristides for pain control at this time    Priyanka Wagner MD 1/6/2020 3:03 AM

## 2020-01-06 NOTE — L&D DELIVERY NOTE
Vacuum Assisted Vaginal Delivery Summary - OB/GYN   Clarisa Kumar 30 y o  female MRN: [id number]  Unit/Bed#: L&D 325-01 Encounter: [id number]      Predelivery Diagnosis:  1  Pregnancy at 39w2d   2  A2gDM on Levemir 30u in AM, Novolog 5u  3  Depression/Anxiety: Zoloft 100mg daily   4  GBS negative     Postdelivery Diagnosis:  1  Same as above  2  Delivery of term     Procedure: Vacuum assisted vaginal delivery, repair of second degree laceration     Attending: Mihir     Assistant: Celio    Anesthesia: Epidural     QBL: 53IY    Complications: none apparent    Specimens: cord blood, arterial and venous cord blood gasses, placenta to storage     Findings:   1  Viable female at 16:11, with APGARS of 7 and 9 at 1 and 5 minutes respectively,  2  Spontaneous delivery of intact placenta at 16:15  3  2 degree laceration repaired with 3-0 Vicryl   4  Vacuum placed 16:08 for 2 minutes, 3 sets of pulls    Disposition:  Patient tolerated the procedure well and was recovering in labor and delivery room     Brief history and labor course:  Ms Clarisa Kumar is a 30 y o   at 39wks  She presented to labor and delivery for induction of labor for A2gDM  Her induction was started with Cytotec  Pitocin was then started  An epidural was placed for pain management  Amniotomy with thin meconium was performed  The patient made steady progress until she was complete at 13:25 and then began pushing at 13:38  The patient began having variable decelerations with pushing  As the patient began to tire after nearly two and a half hours of pushing, the variable decelerations deepened to 90 consistently  Description of procedure  Indication: Indication for vacuum-assisted vaginal delivery was persistent Category II tracing, patient's most recent exam was 10/100/+2, fetal position was RAJAT  Fetal heart tones were 90 to 150  Patient was found to have a high likelihood of successful operative vaginal delivery      Informed consent: The patient was informed of the risks and benefits of the procedure  Risks included but weren't limited to bleeding, infection, injury to then vaginal, cervix, urethra, bladder, rectum, and perineum  The patient was counseled on fetal risks including scalp laceration, bruising, subgaleal hematoma, cephalohematoma, intracranial hematoma,  jaundice and competitions of shoulder dystocia  The patient expressed understanding of the risks involved all questions were answered and the patient consented to the procedure  Operative technique: The patient's bladder was empty to avoid injury, adequate anesthesia was noted and the patient was in the dorsal lithotomy position  The fetal position was checked and confirmed to be RAJAT at +2 station with confirmed rupture of membranes  The vacuum extractor cup was applied at 16:08  It was applied to the fetal median flexion point and centered over the sagittal suture approximately 2 cm anterior to the posterior fontanelle  The check of application was confirmed that there was no maternal tissue within the cut margin  With the start of the contraction a vacuum seal was established to a maximum pressure of 650 mmHg  Gentle traction was then applied advancement of the Fetal head was noted  Three pulls were performed,  0  pop offs occurred and only one application was performed  The vacuum was released upon  of the fetal head and delivery was performed thereafter with the assistance of maternal expulsive efforts   At 16:11 the patient delivered a viable female , wt pending, apgars of 7 (1 min) and 9 (5 min)  The fetal vertex delivered spontaneously  Baby was checked for nuchal  There was no nuchal  The anterior shoulder delivered atraumatically with maternal expulsive forces and the assistance of downward traction  The posterior shoulder delivered with maternal expulsive forces and the assistance of upward traction   The remainder of the fetus delivered spontaneously  Upon delivery, the infant was placed on the mothers abdomen and the cord was clamped and cut  Delayed cord clamping was not performed  The infant was noted to cry spontaneously and was moving all extremities appropriately  There was no evidence for injury  Awaiting nurse resuscitators evaluated the   Arterial and venous cord blood gases and cord blood was collected for analysis  These were promptly sent to the lab  In the immediate post-partum, 30 units of IV pitocin was administered, and the uterus was noted to contract down well with massage and pitocin  The placenta delivered spontaneously at 16:15 and was noted to have a centrally inserted 3 vessel cord  The vagina, cervix, perineum, and rectum were inspected and there was noted to be a second degree laceration  The second degree laceration was repaired with 3-0 Vicryl Rapide in a running fashion  The repair was noted to have good re-approximation and hemostasis  At the conclusion of the procedure, all needle, sponge, and instrument counts were noted to be correct  Patient tolerated the procedure well and was allowed to recover in labor and delivery room with family and  before being transferred to the post-partum floor  The attending was present and participated in all key portions of the case      Fernanda Mosley MD  2020  4:38 PM

## 2020-01-06 NOTE — DISCHARGE INSTRUCTIONS
Self Care After Delivery   AMBULATORY CARE:   The postpartum period  is the period of time from delivery to about 6 weeks  During this time you may experience many physical and emotional changes  It is important to understand what is normal and when you need to call your healthcare provider  It is also important to know how to care for yourself during this time  Call 911 for any of the following:   · You soak through 1 pad in 15 minutes, have blurry vision, clammy or pale skin, and feel faint  · You faint or lose consciousness  · Your heart is beating faster than normal      · You have trouble breathing  · You cough up blood  Seek care immediately if:   · You soak through 1 or more pads in an hour, or pass blood clots larger than a quarter from your vagina  · Your leg is painful, red, and larger than normal      · You have severe abdominal pain  · You have a bad headache or changes in your vision  · Your episiotomy or C section incision is red, swollen, bleeding, or draining pus  · Your incision comes apart  · You see or hear things that are not there, or have thoughts of harming yourself or your baby  Contact your obstetrician or midwife if:   · You have a fever  · You have new or worsening pain in your abdomen or vagina  · You continue to have the baby blues 10 days after you deliver  · You have trouble sleeping  · You have foul-smelling discharge from your vagina  · You have pain or burning when you urinate  · You do not have a bowel movement for 3 days or more  · You have nausea or are vomiting  · You have hard lumps or red streaks over your breasts  · You have cracked nipples or bleed from your nipples  · You have questions or concerns about your condition or care  Physical changes:   The following are normal changes after you give birth:  · Pain in the area between your anus and vagina    · Breast pain    · Constipation or hemorrhoids    · Hot or cold flashes    · Vaginal bleeding or discharge    · Mild to moderate abdominal cramping    · Difficulty controlling bowel movements or urine  Emotional changes: The following are symptoms of the baby blues, or normal emotions after you give birth  The changes in your emotions may be caused by a drop in hormone levels after you deliver  If these symptoms last longer than 1 to 2 weeks after you give birth, you may have postpartum depression  · Feeling irritable    · Feeling sad    · Crying for no reason    · Feeling anxious  Breast care for nursing mothers: You may have sore breasts for 3 to 6 days after you give birth  This happens as your milk begins to fill your breasts  You may also have sore breasts if you do not breastfeed frequently  Do the following to care for your breasts:  · Apply a moist, warm, compress to your breast as directed  This may help soothe your breasts  Make sure the washcloth is not too hot before you apply it to your breast      · Nurse your baby or pump your milk frequently  This may prevent clogged milk ducts  Ask your healthcare provider how often to nurse or pump  · Massage your breasts as directed  This may help increase your milk flow  Gently rub your breasts in a circular motion before you breastfeed  You may need to gently squeeze your breast or nipple to help release milk  You can also use a breast pump to help release milk from your breast      · Wash your breasts with warm water only  Do not put soap on your nipples  Soap may cause your nipples to become dry  · Apply lanolin cream to your nipples as directed  Lanolin cream may add moisture to your skin and prevent nipple dryness  Always  wash off lanolin cream with warm water before you breastfeed  · Place pads in your bra  Your nipples may leak milk when you are not breastfeeding  You can place pads inside of your bra to help prevent leaking onto your clothing   Ask your healthcare provider where to purchase bra pads  · Get breastfeeding support if needed  There are healthcare providers who can answer questions about breastfeeding and provide you with support  Ask your healthcare provider who you can contact if you need breastfeeding support  Breast care for non-nursing mothers:  Milk will fill your breasts even if you bottle feed your baby  Do the following to help stop your milk from filling your breasts and causing pain:  · Wear a bra with support at all times  A sports bra or a tight-fitting bra will help stop your milk from coming in  · Apply ice on each breast for 15 to 20 minutes every hour or as directed  Use an ice pack, or put crushed ice in a plastic bag  Cover it with a towel  Ice helps your milk ducts shrink  · Keep your breasts away from warm water  Warm water will make it easier for milk to fill your breasts  Stand with your breasts away from warm water in the shower  · Limit how much you touch your breasts  This will prevent them from filling with milk  Perineum care: Your perineum is the area between your rectum and vagina  It is normal to have swelling and pain in this area after you give birth  If you had an episiotomy, your healthcare provider may give you special instructions  · Clean your perineum after you use the bathroom  This may prevent infection and help with healing  Use a spray bottle with warm water to clean your perineum  You may also gently spray warm water against your perineum when you urinate  Always wipe front to back  · Take a sitz bath as directed  A sitz bath may help relieve swelling and pain  Fill your bath tub or bucket with water up to your hips and sit in the water  Use cold water for 2 days after you deliver  Then use warm water  Ask your healthcare provider for more information about a sitz bath  · Apply ice packs for the first 24 hours or as directed  Use a plastic glove filled with ice or buy an ice pack   Wrap the ice pack or plastic glove in a small towel or wash cloth  Place the ice pack on your perineum for 20 minutes at a time  · Sit on a donut-shaped pillow  This may relieve pressure on your perineum when you sit  · Use wipes with medicine or take pills as directed  Your healthcare provider may tell you to use witch hazel pads  You can place witch hazel pads in the refrigerator before you apply them to your perineum  He may also tell you to take NSAIDs  Ask your healthcare provider how often to take pills or use wipes with medicine  · Do not go swimming or take tub baths for 4 to 6 weeks or as directed  This will help prevent an infection in your vagina or uterus  Bowel and bladder care: It may take 3 to 5 days to have a bowel movement after you deliver your baby  You can do the following to prevent or manage constipation, and get control of your bowel or bladder:  · Take stool softeners as directed  A stool softener is medicine that will make your bowel movements softer  This may prevent or relieve constipation  A stool softener may also make bowel movements less painful  · Drink plenty of liquids  Ask how much liquid to drink each day and which liquids are best for you  Liquids may help prevent constipation  · Eat foods high in fiber  Examples include fruits, vegetables, grains, beans, and lentils  Ask your healthcare provider how much fiber you need each day  Fiber may prevent constipation  · Do Kegel exercises as directed  Kegel exercises will help strengthen the muscles that control bowel movements and urination  Ask your healthcare provider for more information on Kegel exercises  · Apply cold compresses or medicine to hemorrhoids as directed  This may relieve swelling and pain  Your healthcare provider may tell you to apply ice or wipes with medicine to your hemorrhoids  He may also tell you to use a sitz bath   Ask your healthcare for more information on how to manage hemorrhoids  Nutrition:  Good nutrition is important in the postpartum period  It will help you return to a healthy weight, increase your energy levels, and prevent constipation  It will also help you get enough nutrients and calories if you are going to breastfeed your baby  · Eat a variety of healthy foods  Healthy foods include fruits, vegetables, whole-grain breads, low-fat dairy products, beans, lean meats, and fish  You may need 500 to 700 additional calories each day if you breastfeed your baby  You may also need extra protein  · Limit foods with added sugar and high amounts of fat  These foods are high in calories and low in healthy nutrients  Read food labels so you know how much sugar and fat is in the food you want to eat  · Drink 8 to 10 glasses of water per day  Water will help you make plenty of milk for your baby  It will also help prevent constipation  Drink a glass of water every time you breastfeed your baby  · Take vitamins as directed  Ask your healthcare provider what vitamins you need  · Limit caffeine and alcohol if you are breastfeeding  Caffeine and alcohol can get into your breast milk  Caffeine and alcohol can make your baby fussy  They can also interfere with your baby's sleep  Ask your healthcare provider if you can drink alcohol or caffeine  Rest and sleep: You may feel very tired in the postpartum period  Enough sleep will help you heal and give you energy to care for your baby  The following may help you get sleep and rest:  · Nap when your baby naps  Your baby may nap several times during the day  Get rest during this time  · Limit visitors  Many people may want to see you and your baby  Ask friends or family to visit on different days  This will give you time to rest      · Do not plan too much for one day  Put off household chores so that you have time to rest  Gradually do more each day  · Ask for help from family, friends, or neighbors    Ask them to help you with laundry, cleaning, or errands  Also ask someone to watch the baby while you take a nap or relax  Ask your partner to help with the care of your baby  Pump some of your breast milk so your partner can feed your baby during the night  Exercise after delivery:  Wait until your healthcare provider says it is okay to exercise  Exercise can help you lose weight, increase your energy levels, and manage your mood  It can also prevent constipation and blood clots  Start with gentle exercises such as walking  Do more as you have more energy  You may need to avoid abdominal exercises for 1 to 2 weeks after you deliver  Talk to your healthcare provider about an exercise plan that is right for you  Sexual activity after delivery:   · Do not have sex until your healthcare provider says it is okay  You may need to wait 4 to 6 weeks before you have sex  This may prevent infection and allow time to heal      · Your menstrual cycle may begin as soon as 3 weeks after you deliver  Your period may be delayed if you breastfeed your baby  You can become pregnant before you get your first postpartum period  Talk to your healthcare provider about birth control that is right for you  Some types of birth control are not safe during breastfeeding  For support and more information:  Join a support group for new mothers  Ask for help from family and friends with chores, errands, and care of your baby  · Office of Women's Health, [place] Department of Health and Human Services  [address], [address] [place] [address]  [address], [address] [place] [address]  Phone: 9- 124 [phone]  Web Address: www womenshealth gov  · [facility] Postpartum [address] [facility] , [address] [place] [address]  [address]  Web Address: [employer]  org/pregnancy/postpartum-care  aspx  Follow up with your obstetrician or midwife as directed:   You will need to follow up with your healthcare provider in 2 to 6 weeks after delivery  Write down your questions so you remember to ask them at your visits  © 2017 [address] [employer] [employer] Information is for End User's use only and may not be sold, redistributed or otherwise used for commercial purposes  All illustrations and images included in CareNotes® are the copyrighted property of A D A M , Inc  or Nilesh Donald  The above information is an  only  It is not intended as medical advice for individual conditions or treatments  Talk to your doctor, nurse or pharmacist before following any medical regimen to see if it is safe and effective for you

## 2020-01-06 NOTE — OB LABOR/OXYTOCIN SAFETY PROGRESS
Oxytocin Safety Progress Check Note - Cheri Mendez 30 y o  female MRN: [id number]    Unit/Bed#: L&D 325-01 Encounter: [id number]    Dose (citlaly-units/min) Oxytocin: 2 citlaly-units/min  Contraction Frequency (minutes): 2-4  Contraction Quality: Strong  Tachysystole: No   Dilation: 10  Dilation Complete Date: 01/06/20  Dilation Complete Time: 1325  Effacement (%): 100  Station: 1  Baseline Rate: 135 bpm  Fetal Heart Rate: 135 BPM  FHR Category: Category II  Oxytocin Safety Progress Check: Safety check completed          Notes/comments:   Patient checked at 1325   Complete and +1  Will start pushing, remove kyle Singh MD 1/6/2020 1:49 PM

## 2020-01-06 NOTE — DISCHARGE SUMMARY
Discharge Summary - OB/GYN   Richard Nash 30 y o  female MRN: [id number]  Unit/Bed#: L&D 325-01 Encounter: [id number]      Admission Date: 2020     Discharge Date: 20    Admitting Diagnosis:   1  Pregnancy at 39w3d  2  A2GDM  3  Depression/Anxiety: Zoloft 100mg daily   4  GBS negative     Discharge Diagnosis:   Same, delivered      Procedures: vacuum, outlet    Attending: Yudelka Castano MD    Hospital Course:     Richard Nash is a 30 y o   at 39w3d wks who was initially admitted to labor and delivery for induction of labor for A2gDM  Her induction was started with Cytotec  Pitocin was then started  An epidural was placed for pain management  Amniotomy with thin meconium was performed  The patient made steady progress until she was complete at 13:25 and then began pushing at 13:38  The patient began having variable decelerations with pushing  As the patient began to tire after nearly two and a half hours of pushing, the variable decelerations deepened to 90 consistently  She delivered a viable female  on 20 at 16:11  Weight 7lbs 11oz via vacuum, outlet  Apgars were 7 (1 min) and 9 (5 min)   was transferred to  nursery  Patient tolerated the procedure well and was transferred to recovery in stable condition  Her post-partum course was uncomplicated  Her post-partum pain was well controlled with oral analgesics  On day of discharge, she was ambulating and able to reasonably perform all ADLs  She was voiding and had appropriate bowel function  Pain was well controlled  She was discharged home on post-partum day #2 without complications  Patient was instructed to follow up with her OB as an outpatient and was given appropriate warnings to call provider if she develops signs of infection or uncontrolled pain      Complications: none apparent    Condition at discharge: good     Discharge instructions/Information to patient and family:   See after visit summary for information provided to patient and family  Provisions for Follow-Up Care:  See after visit summary for information related to follow-up care and any pertinent home health orders  Disposition: Home    Planned Readmission: No    Discharge Medications: For a complete list of the patient's medications, please refer to her med rec

## 2020-01-06 NOTE — H&P
H&P Exam - Obstetrics   Lee Harris 30 y o  female MRN: [id number]  Unit/Bed#: L&D 325-01 Encounter: [id number]      History of Present Illness     Chief Complaint: Induction of labor    HPI:  Lee Harris is a 30 y o   female with an DAVID of 1/10/2020, by Last Menstrual Period at 39w2d weeks gestation who is being admitted for induction of labor secondary to A2GDM  Contractions: no  Loss of fluid: no  Vaginal bleeding: no  Fetal movement: yes    She is ACD patient  PREGNANCY COMPLICATIONS:   1) O6BFH: Levemir 30u in AM, novolog 5u prior to dinner  2) Depression/anxiety: Zoloft 100mg daily  3) GBS negative    OB History    Para Term  AB Living   2 0 0 0 1 0   SAB TAB Ectopic Multiple Live Births                  # Outcome Date GA Lbr Terrence/2nd Weight Sex Delivery Anes PTL Lv   2 Current            1 AB  9w0d             Birth Comments: surgically induced        Baby complications/comments: None     Review of Systems   Constitutional: Negative for diaphoresis and fever  HENT: Negative for hearing loss, nosebleeds, tinnitus and trouble swallowing  Eyes: Negative for photophobia and visual disturbance  Respiratory: Negative for apnea, shortness of breath and wheezing  Cardiovascular: Negative for chest pain and palpitations  Gastrointestinal: Negative for blood in stool and vomiting  Endocrine: Negative for polydipsia and polyphagia  Genitourinary: Negative for dysuria and hematuria  Musculoskeletal: Negative for arthralgias, gait problem and myalgias  Skin: Negative for color change and pallor  Neurological: Negative for dizziness, seizures, facial asymmetry, speech difficulty, numbness and headaches  Psychiatric/Behavioral: Negative for agitation, behavioral problems and confusion          Historical Information   Past Medical History:   Diagnosis Date    Anemia     anemia of pregnancy    Anxiety     Complication of anesthesia     extreme nausea    Diabetes mellitus ([facility] [address] )     A2GDM    Urinary tract infection     Varicella     had as child     Past Surgical History:   Procedure Laterality Date    APPENDECTOMY      DILATION AND EVACUATION      TONSILLECTOMY      WISDOM TOOTH EXTRACTION       Social History   Social History     Substance and Sexual Activity   Alcohol Use Yes    Comment: socially prior to knowledge of pregnancy     Social History     Substance and Sexual Activity   Drug Use Never     Social History     Tobacco Use   Smoking Status Former Smoker    Types: Cigarettes    Last attempt to quit: 1/1/2017    Years since quitting: 3 0   Smokeless Tobacco Never Used     Family History: non-contributory    Meds/Allergies      Medications Prior to Admission   Medication    docusate sodium (COLACE) 100 mg capsule    ferrous sulfate 325 (65 Fe) mg tablet    insulin aspart (NOVOLOG FLEXPEN) 100 Units/mL injection pen    Insulin Pen Needle 31G X 5 MM MISC    LEVEMIR FLEXTOUCH 100 units/mL injection pen    NOVOTWIST 32G X 5 MM MISC    ONETOUCH DELICA LANCETS 20N MISC    ONETOUCH VERIO test strip    Prenatal MV-Min-Fe Fum-FA-DHA (PRENATAL 1 PO)    sertraline (ZOLOFT) 100 mg tablet        Allergies   Allergen Reactions    Hydrocodone-Acetaminophen Other (See Comments) and GI Intolerance     4 hours after wisdom tooth extraction, patient took pain pill and started spinning, vomiting, and sweating - unsure if related to pain medication or anesthesia        OBJECTIVE:    Vitals: Blood pressure 122/77, pulse 96, temperature 99 °F (37 2 °C), temperature source Oral, resp  rate 18, height 5' 5" (1 651 m), weight 78 9 kg (174 lb), last menstrual period 04/05/2019, currently breastfeeding  Body mass index is 28 96 kg/m²  Physical Exam   Constitutional: She appears well-nourished  HENT:   Head: Normocephalic and atraumatic  Cardiovascular: Normal rate, regular rhythm and normal heart sounds     Pulmonary/Chest: Breath sounds normal  No respiratory distress  She has no wheezes  Abdominal: Bowel sounds are normal  There is no tenderness       [place]: Deferred  Nitrazine: Deferred    Cervix:  Dilation: 1  Effacement (%): 50  Station: -3    Fetal heart rate:   Variability: Moderate 6-25 bpm  Accelerations: 15 x 15 or greater  Decelerations: None  FHR Category: Category I    [place]: Occasional contractions on tocometer; patient does not appreciate these       Position: vertex by ultrasound    EFW: 7 5lbs    GBS: Negative    Prenatal Labs:   Blood Type:   Lab Results   Component Value Date/Time    ABO Grouping A 10/24/2019 02:21 PM     , D (Rh type):   Lab Results   Component Value Date/Time    Rh Factor Positive 10/24/2019 02:21 PM   HCT/HGB:   Lab Results   Component Value Date/Time    Hematocrit 31 5 (L) 10/24/2019 02:21 PM    Hemoglobin 10 4 (L) 10/24/2019 02:21 PM    MCV:   Lab Results   Component Value Date/Time     (H) 10/24/2019 02:21 PM    Platelets:   Lab Results   Component Value Date/Time    Platelets 160  02:21 PM   1 hour Glucola:   Lab Results   Component Value Date/Time    Glucose 192 (H) 10/24/2019 02:21 PM   Varicella:   Lab Results   Component Value Date/Time    Varicella IgG IMMUNE 10/24/2019 02:21 PM      Rubella:   Lab Results   Component Value Date/Time    Rubella IgG Quant 103 5 10/24/2019 02:21 PM      VDRL/RPR:   Lab Results   Component Value Date/Time    RPR Non-Reactive 10/24/2019 02:21 PM   Urine Culture/Screen:   Lab Results   Component Value Date/Time    Urine Culture No Growth <1000 cfu/mL 10/24/2019 02:21 PM   Hep B:   Lab Results   Component Value Date/Time    Hepatitis B Surface Ag Non-reactive 10/24/2019 02:21 PM   HIV:   Lab Results   Component Value Date/Time    HIV-1/HIV-2 Ab Non-Reactive 10/24/2019 02:21 PM   Group B Strep:    Lab Results   Component Value Date/Time    Strep Grp B PCR Negative for Beta Hemolytic Strep Grp B by PCR 2019 10:48 AM            Assessment/Plan     ASSESSMENT:  30yo  at 39w2d weeks gestation who is being admitted for induction of labor secondary to A2GDM  PLAN:   - Admit   - CBC, RPR, Blood Type   - Start with cytotec   - GBS negative status: no PCN for prophylaxis    - Analgesia and/or epidural at patient request   - Discussed with Dr Caitlyn Peterson      This patient will be an INPATIENT  and I certify the anticipated length of stay is >2 Midnights      Daisy Morales MD  1/5/2020  8:53 PM

## 2020-01-06 NOTE — OB LABOR/OXYTOCIN SAFETY PROGRESS
Oxytocin Safety Progress Check Note - Parag Racer 30 y o  female MRN: [id number]    Unit/Bed#: L&D 325-01 Encounter: [id number]    Dose (citlaly-units/min) Oxytocin: 2 citlaly-units/min  Contraction Frequency (minutes): 2-3  Contraction Quality: Strong  Tachysystole: No   Dilation: Lip/rim (Comment)        Effacement (%): 100  Station: 1  Baseline Rate: 145 bpm  Fetal Heart Rate: 135 BPM  FHR Category: Category II  Oxytocin Safety Progress Check: Safety check completed          Notes/comments:   Pt comfortable with epidural    Pt making good cervical change to 9 5/100/+1 cm  FHT:  category I,  Base line 145, moderate variability, ctx q 1-3min  Currently without decelerations      Will recheck pt in 1 hours, sooner if patient uncomfortable      Lisa Thacker MD 1/6/2020 11:49 AM

## 2020-01-06 NOTE — UTILIZATION REVIEW
Notification of Maternity/Delivery & [place] Birth Information for Admission   Notification of Maternity/Delivery for Admission to our facility [address]  Be advised that this patient was admitted to our facility under Inpatient Status  Contact Raj Metcalf at [phone] for additional admission information  [facility] PARENT/CHILD HEALTH UR DEPT  DEDICATED [phone]  Mother & [place] Information   Patient Name: Lady Jorge   YOB: 1990   Delivering clinician: This patient has no babies on file  OB History        2    Para   0    Term   0       0    AB   1    Living   0       SAB        TAB        Ectopic        Multiple        Live Births                   [place] Name & MRN:   This patient has no babies on file  [place] Birth Information: 30 y o  female MRN: [id number] Unit/Bed#: L&D 325-01 Estimated Date of Delivery: 1/10/20  Birthweight: No birth weight on file  Gestational Age: <None> Delivery Type: This patient has no babies on file  APGARS  One minute Five minutes Ten minutes   Totals:                 [address]   [address]:   [facility]  Tax ID: [id number]  NPI: [id number] Attending Provider/NPI: Hanna Tsang Md [[id number]]   Attending Physician:  GABRIELA Fournier  Specialty- Obstetrics and Gynecology  [address] [id number]  [address] [place] [address] [facility]  Phone 1: [phone]  Fax: [phone]   Place of Service Code: 24     Place of Service Name:  [address] [place] [address]   Start Date: 20 [facility]     Discharge Date & Time: No discharge date for patient encounter  Type of Admission: Inpatient Status Discharge Disposition (if discharged): Final discharge disposition not confirmed   Patient Diagnoses:   Encounter for induction of labor [Z34 90]  The encounter diagnosis was 39 weeks gestation of pregnancy     1  39 weeks gestation of pregnancy       Orders: Admission Orders (From admission, onward)     Ordered        01/05/20 2013  Inpatient Admission  Once                    Assigned Utilization Review Contact: Yumiko Schmidt Utilization Review Department  Phone: [phone]; Fax [phone]  Email: [email or web address]

## 2020-01-06 NOTE — OB LABOR/OXYTOCIN SAFETY PROGRESS
Labor Progress Note - Brittany Garg 30 y o  female MRN: [id number]    Dose (citlaly-units/min) Oxytocin: 4 citlaly-units/min  Contraction Frequency (minutes): difficult tracing(Dr Pickens aware )  Contraction Quality: Moderate  Tachysystole: No   Dilation: 6        Effacement (%): 90  Station: -2  Baseline Rate: 140 bpm  Fetal Heart Rate: 135 BPM  FHR Category: Category I             Notes/comments:     Patient is more comfortable with her epidural in place  Amniotomy was performed with thin meconium with particulate matter  Cervical exam is now 6/90/-2  An IUPC was placed     SVE: 6/90/-2  FHT: Cat 1: 120/moderate/accelerations, no decelerations   [place]: difficult to trace, IUPC placed   Plan: Continue to monitor       Gerard Pemberton MD 1/6/2020 8:38 AM

## 2020-01-06 NOTE — PLAN OF CARE
Problem: PAIN - ADULT  Goal: Verbalizes/displays adequate comfort level or baseline comfort level  Description  Interventions:  - Encourage patient to monitor pain and request assistance  - Assess pain using appropriate pain scale  - Administer analgesics based on type and severity of pain and evaluate response  - Implement non-pharmacological measures as appropriate and evaluate response  - Consider cultural and social influences on pain and pain management  - Notify physician/advanced practitioner if interventions unsuccessful or patient reports new pain  Outcome: Progressing     Problem: INFECTION - ADULT  Goal: Absence or prevention of progression during hospitalization  Description  INTERVENTIONS:  - Assess and monitor for signs and symptoms of infection  - Monitor lab/diagnostic results  - Monitor all insertion sites, i e  indwelling lines, tubes, and drains  - Monitor endotracheal if appropriate and nasal secretions for changes in amount and color  - [place] appropriate cooling/warming therapies per order  - Administer medications as ordered  - Instruct and encourage patient and family to use good hand hygiene technique  - Identify and instruct in appropriate isolation precautions for identified infection/condition  Outcome: Progressing  Goal: Absence of fever/infection during neutropenic period  Description  INTERVENTIONS:  - Monitor WBC    Outcome: Progressing     Problem: SAFETY ADULT  Goal: Patient will remain free of falls  Description  INTERVENTIONS:  - Assess patient frequently for physical needs  -  Identify cognitive and physical deficits and behaviors that affect risk of falls    -  [place] fall precautions as indicated by assessment   - Educate patient/family on patient safety including physical limitations  - Instruct patient to call for assistance with activity based on assessment  - Modify environment to reduce risk of injury  - Consider OT/PT consult to assist with strengthening/mobility  Outcome: Progressing  Goal: Maintain or return to baseline ADL function  Description  INTERVENTIONS:  -  Assess patient's ability to carry out ADLs; assess patient's baseline for ADL function and identify physical deficits which impact ability to perform ADLs (bathing, care of mouth/teeth, toileting, grooming, dressing, etc )  - Assess/evaluate cause of self-care deficits   - Assess range of motion  - Assess patient's mobility; develop plan if impaired  - Assess patient's need for assistive devices and provide as appropriate  - Encourage maximum independence but intervene and supervise when necessary  - Involve family in performance of ADLs  - Assess for home care needs following discharge   - Consider OT consult to assist with ADL evaluation and planning for discharge  - Provide patient education as appropriate  Outcome: Progressing  Goal: Maintain or return mobility status to optimal level  Description  INTERVENTIONS:  - Assess patient's baseline mobility status (ambulation, transfers, stairs, etc )    - Identify cognitive and physical deficits and behaviors that affect mobility  - Identify mobility aids required to assist with transfers and/or ambulation (gait belt, sit-to-stand, lift, walker, cane, etc )  - [place] fall precautions as indicated by assessment  - Record patient progress and toleration of activity level on Mobility SBAR; progress patient to next Phase/Stage  - Instruct patient to call for assistance with activity based on assessment  - Consider rehabilitation consult to assist with strengthening/weightbearing, etc   Outcome: Progressing     Problem: DISCHARGE PLANNING  Goal: Discharge to home or other facility with appropriate resources  Description  INTERVENTIONS:  - Identify barriers to discharge w/patient and caregiver  - Arrange for needed discharge resources and transportation as appropriate  - Identify discharge learning needs (meds, wound care, etc )  - Arrange for interpretive services to assist at discharge as needed  - Refer to Case Management Department for coordinating discharge planning if the patient needs post-hospital services based on physician/advanced practitioner order or complex needs related to functional status, cognitive ability, or social support system  Outcome: Progressing     Problem: BIRTH - VAGINAL/ SECTION  Goal: Fetal and maternal status remain reassuring during the birth process  Description  INTERVENTIONS:  - Monitor vital signs  - Monitor fetal heart rate  - Monitor uterine activity  - Monitor labor progression (vaginal delivery)  - DVT prophylaxis  - Antibiotic prophylaxis  Outcome: Progressing  Goal: Emotionally satisfying birthing experience for mother/fetus  Description  Interventions:  - Assess, plan, implement and evaluate the nursing care given to the patient in labor  - Advocate the philosophy that each childbirth experience is a unique experience and support the family's chosen level of involvement and control during the labor process   - Actively participate in both the patient's and family's teaching of the birth process  - Consider cultural, Scientologist and age-specific factors and plan care for the patient in labor  Outcome: Progressing

## 2020-01-06 NOTE — ANESTHESIA PREPROCEDURE EVALUATION
Review of Systems/Medical History  Patient summary reviewed  Chart reviewed  History of anesthetic complications PONV    Cardiovascular  Negative cardio ROS    Pulmonary  Negative pulmonary ROS        GI/Hepatic  Negative GI/hepatic ROS          Negative  ROS        Endo/Other  Diabetes ,      GYN  Currently pregnant ,          Hematology   Musculoskeletal  Negative musculoskeletal ROS        Neurology  Negative neurology ROS      Psychology   Anxiety,              Physical Exam    Airway    Mallampati score: II  TM Distance: >3 FB  Neck ROM: full     Dental   No notable dental hx     Cardiovascular  Comment: Negative ROS, Rhythm: regular, Rate: normal, Cardiovascular exam normal    Pulmonary  Pulmonary exam normal Breath sounds clear to auscultation,     Other Findings        Anesthesia Plan  ASA Score- 2     Anesthesia Type- epidural with ASA Monitors  Additional Monitors:   Airway Plan:         Plan Factors-    Induction-     Postoperative Plan-     Informed Consent- Anesthetic plan and risks discussed with patient

## 2020-01-07 PROCEDURE — 99024 POSTOP FOLLOW-UP VISIT: CPT | Performed by: OBSTETRICS & GYNECOLOGY

## 2020-01-07 RX ADMIN — IBUPROFEN 600 MG: 600 TABLET ORAL at 19:45

## 2020-01-07 RX ADMIN — ACETAMINOPHEN 650 MG: 325 TABLET ORAL at 16:44

## 2020-01-07 RX ADMIN — ACETAMINOPHEN 650 MG: 325 TABLET ORAL at 10:05

## 2020-01-07 RX ADMIN — IBUPROFEN 600 MG: 600 TABLET ORAL at 13:25

## 2020-01-07 RX ADMIN — SERTRALINE HYDROCHLORIDE 100 MG: 100 TABLET ORAL at 08:28

## 2020-01-07 RX ADMIN — IBUPROFEN 600 MG: 600 TABLET ORAL at 06:26

## 2020-01-07 RX ADMIN — SENNOSIDES 8.6 MG: 8.6 TABLET, FILM COATED ORAL at 08:28

## 2020-01-07 NOTE — LACTATION NOTE
This note was copied from a baby's chart  Attempted to see mom to discuss breastfeeding  Mom had visitors and wanted me to come back at a later time  She had  Few questions, that I answered and then I asked her to call me for next feeding to evaluate her latch and to go over teaching

## 2020-01-07 NOTE — PROGRESS NOTES
Progress Note - OB/GYN   Chacho Pollack 30 y o  female MRN: [id number]  Unit/Bed#: L&D 312-01 Encounter: [id number]    Assessment:  30 y o   s/p Vacuum Vaginal Delivery Postpartum day  1    Plan:  1  Routine post-partum care  2  Encourage ambulation  3  Pain control as needed  4  Advance diet as tolerated  5  Rhogam not indicated  6  Anticipate discharge 20    Subjective/Objective   Chief Complaint:       Subjective:  Chacho Pollack is well appearing and has no complaints at this time  She denies any dizziness, nausea, vomiting, chest pain, shortness of breath, palpitations, or headaches  Pain: Well controlled with pain medication regimen  Tolerating PO: yes  Voiding: yes  Flatus: yes  BM: no  Ambulating: yes  Breastfeeding: yes  Chest pain: no  Shortness of breath: no  Leg pain: no  Lochia: Decreasing    Objective:     Vitals: Blood pressure 95/59, pulse 72, temperature 98 2 °F (36 8 °C), temperature source Oral, resp  rate 18, height 5' 5" (1 651 m), weight 78 9 kg (174 lb), last menstrual period 2019, SpO2 98 %, currently breastfeeding      Intake/Output Summary (Last 24 hours) at 2020  Last data filed at 2020  Gross per 24 hour   Intake 3415 83 ml   Output 2720 ml   Net 695 83 ml       Physical Exam:     General: NAD  Cardiovascular: RRR, no murmur, nl S1/S2   Lungs: CTAB, non-labored breathing   Abdomen: Soft, no distension/rebound/guarding/tenderness   Fundus: Firm, non-tender, fundus: -2 cm below the umbilicus   Lower Extremities: Non-tender      Lab, Imaging and other studies:     Recent Results (from the past 72 hour(s))   Type and screen    Collection Time: 20  8:46 PM   Result Value Ref Range    ABO Grouping A     Rh Factor Positive     Antibody Screen Negative     Specimen Expiration Date [id number]    CBC and differential    Collection Time: 20  8:46 PM   Result Value Ref Range    WBC 9 46 4 31 - 10 16 Thousand/uL    RBC 3 87 3 81 - 5 12 Million/uL Hemoglobin 13 0 11 5 - 15 4 g/dL    Hematocrit 39 0 34 8 - 46 1 %     (H) 82 - 98 fL    MCH 33 6 26 8 - 34 3 pg    MCHC 33 3 31 4 - 37 4 g/dL    RDW 12 9 11 6 - 15 1 %    MPV 11 0 8 9 - 12 7 fL    Platelets 236 935 - 655 Thousands/uL    nRBC 0 /100 WBCs    Neutrophils Relative 72 43 - 75 %    Immat GRANS % 1 0 - 2 %    Lymphocytes Relative 17 14 - 44 %    Monocytes Relative 9 4 - 12 %    Eosinophils Relative 1 0 - 6 %    Basophils Relative 0 0 - 1 %    Neutrophils Absolute 6 86 1 85 - 7 62 Thousands/µL    Immature Grans Absolute 0 07 0 00 - 0 20 Thousand/uL    Lymphocytes Absolute 1 62 0 60 - 4 47 Thousands/µL    Monocytes Absolute 0 81 0 17 - 1 22 Thousand/µL    Eosinophils Absolute 0 08 0 00 - 0 61 Thousand/µL    Basophils Absolute 0 02 0 00 - 0 10 Thousands/µL   RPR    Collection Time: 01/05/20  8:46 PM   Result Value Ref Range    RPR Non-Reactive Non-Reactive   Fingerstick Glucose (POCT)    Collection Time: 01/05/20  8:57 PM   Result Value Ref Range    POC Glucose 117 65 - 140 mg/dl   Fingerstick Glucose (POCT)    Collection Time: 01/05/20 10:53 PM   Result Value Ref Range    POC Glucose 92 65 - 140 mg/dl   Fingerstick Glucose (POCT)    Collection Time: 01/06/20  2:00 AM   Result Value Ref Range    POC Glucose 64 (L) 65 - 140 mg/dl   Fingerstick Glucose (POCT)    Collection Time: 01/06/20  3:01 AM   Result Value Ref Range    POC Glucose 73 65 - 140 mg/dl   Fingerstick Glucose (POCT)    Collection Time: 01/06/20  3:57 AM   Result Value Ref Range    POC Glucose 75 65 - 140 mg/dl   Fingerstick Glucose (POCT)    Collection Time: 01/06/20  4:55 AM   Result Value Ref Range    POC Glucose 85 65 - 140 mg/dl   Fingerstick Glucose (POCT)    Collection Time: 01/06/20  5:41 AM   Result Value Ref Range    POC Glucose 83 65 - 140 mg/dl   Fingerstick Glucose (POCT)    Collection Time: 01/06/20  7:09 AM   Result Value Ref Range    POC Glucose 72 65 - 140 mg/dl   Fingerstick Glucose (POCT)    Collection Time: 01/06/20 9:15 AM   Result Value Ref Range    POC Glucose 63 (L) 65 - 140 mg/dl   Fingerstick Glucose (POCT)    Collection Time: 01/06/20 11:16 AM   Result Value Ref Range    POC Glucose 77 65 - 140 mg/dl   Fingerstick Glucose (POCT)    Collection Time: 01/06/20  1:14 PM   Result Value Ref Range    POC Glucose 66 65 - 140 mg/dl   Fingerstick Glucose (POCT)    Collection Time: 01/06/20  3:04 PM   Result Value Ref Range    POC Glucose 76 65 - 140 mg/dl   Blood gas, arterial, cord    Collection Time: 01/06/20  4:13 PM   Result Value Ref Range    pH, Cord Art 7 073 (L) 7 230 - 7 430    pCO2, Cord Art 61 4 (H) 30 0 - 60 0    pO2, Cord Art 15 3 5 0 - 25 0 mm HG    HCO3, Cord Art 17 5 17 3 - 27 3 mmol/L    Base Exc, Cord Art -13 1 (L) 3 0 - 11 0 mmol/L    O2 Content, Cord Art 3 3 ml/dl    O2 Hgb, Arterial Cord 17 7 %   Blood gas, venous, cord    Collection Time: 01/06/20  4:13 PM   Result Value Ref Range    pH, Cord Joel 7 148 (L) 7 190 - 7 490    pCO2, Cord Joel 54 2 (H) 27 0 - 43 0 mm HG    pO2, Cord Joel 18 9 15 0 - 45 0 mm HG    HCO3, Cord Joel 18 4 12 2 - 28 6 mmol/L    Base Exc, Cord Joel -10 8 (L) 1 0 - 9 0 mmol/L    O2 Cont, Cord Joel 6 5 mL/dL    O2 HGB,VENOUS CORD 33 5 %     Meds:    senna 1 tablet Oral Daily   sertraline 100 mg Oral Daily       acetaminophen 650 mg Q6H PRN   benzocaine-menthol-lanolin-aloe  4x Daily PRN   calcium carbonate 1,000 mg Daily PRN   hydrocortisone 1 application 4x Daily PRN   ibuprofen 600 mg Q6H PRN   ondansetron 4 mg Q6H PRN   ondansetron 4 mg Q8H PRN   witch hazel-glycerin 1 pad PRN             Signature / Title: Cierra Harrison MD, Ob/Gyn, PGY-1  Date: 1/7/2020  Time: 6:22 AM

## 2020-01-07 NOTE — LACTATION NOTE
This note was copied from a baby's chart  Met with mother  Provided mother with Ready, Set, Baby booklet  Discussed Skin to Skin contact an benefits to mom and baby  Talked about the delay of the first bath until baby has adjusted  Spoke about the benefits of rooming in  Feeding on cue and what that means for recognizing infant's hunger  Avoidance of pacifiers for the first month discussed  Talked about exclusive breastfeeding for the first 6 months  Positioning and latch reviewed as well as showing images of other feeding positions  Discussed the properties of a good latch in any position  Reviewed hand/manual expression  Discussed s/s that baby is getting enough milk and some s/s that breastfeeding dyad may need further help  Gave information on common concerns, what to expect the first few weeks after delivery, preparing for other caregivers, and how partners can help  Resources for support also provided  Assisted mom with feeding  Baby has been sleepy  I demo  ways to awaken baby  Mom verb she is using the cradle hold  Baby woke up  I demo  to mom how to do the football hold and the cross cradle hold, how to hand express and how to get a deep latch  Baby would root and latch ok at first but then she would push the nipple out with her tongue and end up on the tip of nipple  Mom would pull baby off nipple because this caused her pain  I explained that she should always break the seal with her finger first, and then remove baby and re-latch her  I  Assessed baby's suck and her tongue  She has restrictive movement of her tongue and I feel a bump when I try to finger sweep under her tongue  She also has quivering of her lower chin  I discussed how tongue tie's can sometimes affect breastfeeding, but reassured her it does not always cause a problem  I gave her the [facility] handout on tongue tie and suggested she have baby evaluated at the [facility] support center if she continues to have painful latches  Enc her to call for assistance, phone # given

## 2020-01-07 NOTE — PLAN OF CARE
Problem: PAIN - ADULT  Goal: Verbalizes/displays adequate comfort level or baseline comfort level  Description  Interventions:  - Encourage patient to monitor pain and request assistance  - Assess pain using appropriate pain scale  - Administer analgesics based on type and severity of pain and evaluate response  - Implement non-pharmacological measures as appropriate and evaluate response  - Consider cultural and social influences on pain and pain management  - Notify physician/advanced practitioner if interventions unsuccessful or patient reports new pain  Outcome: Progressing     Problem: INFECTION - ADULT  Goal: Absence or prevention of progression during hospitalization  Description  INTERVENTIONS:  - Assess and monitor for signs and symptoms of infection  - Monitor lab/diagnostic results  - Monitor all insertion sites, i e  indwelling lines, tubes, and drains  - Monitor endotracheal if appropriate and nasal secretions for changes in amount and color  - [place] appropriate cooling/warming therapies per order  - Administer medications as ordered  - Instruct and encourage patient and family to use good hand hygiene technique  - Identify and instruct in appropriate isolation precautions for identified infection/condition  Outcome: Progressing  Goal: Absence of fever/infection during neutropenic period  Description  INTERVENTIONS:  - Monitor WBC    Outcome: Progressing     Problem: SAFETY ADULT  Goal: Patient will remain free of falls  Description  INTERVENTIONS:  - Assess patient frequently for physical needs  -  Identify cognitive and physical deficits and behaviors that affect risk of falls    -  [place] fall precautions as indicated by assessment   - Educate patient/family on patient safety including physical limitations  - Instruct patient to call for assistance with activity based on assessment  - Modify environment to reduce risk of injury  - Consider OT/PT consult to assist with strengthening/mobility  Outcome: Progressing  Goal: Maintain or return to baseline ADL function  Description  INTERVENTIONS:  -  Assess patient's ability to carry out ADLs; assess patient's baseline for ADL function and identify physical deficits which impact ability to perform ADLs (bathing, care of mouth/teeth, toileting, grooming, dressing, etc )  - Assess/evaluate cause of self-care deficits   - Assess range of motion  - Assess patient's mobility; develop plan if impaired  - Assess patient's need for assistive devices and provide as appropriate  - Encourage maximum independence but intervene and supervise when necessary  - Involve family in performance of ADLs  - Assess for home care needs following discharge   - Consider OT consult to assist with ADL evaluation and planning for discharge  - Provide patient education as appropriate  Outcome: Progressing  Goal: Maintain or return mobility status to optimal level  Description  INTERVENTIONS:  - Assess patient's baseline mobility status (ambulation, transfers, stairs, etc )    - Identify cognitive and physical deficits and behaviors that affect mobility  - Identify mobility aids required to assist with transfers and/or ambulation (gait belt, sit-to-stand, lift, walker, cane, etc )  - [place] fall precautions as indicated by assessment  - Record patient progress and toleration of activity level on Mobility SBAR; progress patient to next Phase/Stage  - Instruct patient to call for assistance with activity based on assessment  - Consider rehabilitation consult to assist with strengthening/weightbearing, etc   Outcome: Progressing     Problem: DISCHARGE PLANNING  Goal: Discharge to home or other facility with appropriate resources  Description  INTERVENTIONS:  - Identify barriers to discharge w/patient and caregiver  - Arrange for needed discharge resources and transportation as appropriate  - Identify discharge learning needs (meds, wound care, etc )  - Arrange for interpretive services to assist at discharge as needed  - Refer to Case Management Department for coordinating discharge planning if the patient needs post-hospital services based on physician/advanced practitioner order or complex needs related to functional status, cognitive ability, or social support system  Outcome: Progressing     Problem: Potential for Falls  Goal: Patient will remain free of falls  Description  INTERVENTIONS:  - Assess patient frequently for physical needs  -  Identify cognitive and physical deficits and behaviors that affect risk of falls    -  [place] fall precautions as indicated by assessment   - Educate patient/family on patient safety including physical limitations  - Instruct patient to call for assistance with activity based on assessment  - Modify environment to reduce risk of injury  - Consider OT/PT consult to assist with strengthening/mobility  Outcome: Progressing

## 2020-01-07 NOTE — PLAN OF CARE
Problem: PAIN - ADULT  Goal: Verbalizes/displays adequate comfort level or baseline comfort level  Description  Interventions:  - Encourage patient to monitor pain and request assistance  - Assess pain using appropriate pain scale  - Administer analgesics based on type and severity of pain and evaluate response  - Implement non-pharmacological measures as appropriate and evaluate response  - Consider cultural and social influences on pain and pain management  - Notify physician/advanced practitioner if interventions unsuccessful or patient reports new pain  Outcome: Progressing     Problem: INFECTION - ADULT  Goal: Absence or prevention of progression during hospitalization  Description  INTERVENTIONS:  - Assess and monitor for signs and symptoms of infection  - Monitor lab/diagnostic results  - Monitor all insertion sites, i e  indwelling lines, tubes, and drains  - Monitor endotracheal if appropriate and nasal secretions for changes in amount and color  - [place] appropriate cooling/warming therapies per order  - Administer medications as ordered  - Instruct and encourage patient and family to use good hand hygiene technique  - Identify and instruct in appropriate isolation precautions for identified infection/condition  Outcome: Progressing  Goal: Absence of fever/infection during neutropenic period  Description  INTERVENTIONS:  - Monitor WBC    Outcome: Progressing     Problem: SAFETY ADULT  Goal: Patient will remain free of falls  Description  INTERVENTIONS:  - Assess patient frequently for physical needs  -  Identify cognitive and physical deficits and behaviors that affect risk of falls    -  [place] fall precautions as indicated by assessment   - Educate patient/family on patient safety including physical limitations  - Instruct patient to call for assistance with activity based on assessment  - Modify environment to reduce risk of injury  - Consider OT/PT consult to assist with strengthening/mobility  Outcome: Progressing  Goal: Maintain or return to baseline ADL function  Description  INTERVENTIONS:  -  Assess patient's ability to carry out ADLs; assess patient's baseline for ADL function and identify physical deficits which impact ability to perform ADLs (bathing, care of mouth/teeth, toileting, grooming, dressing, etc )  - Assess/evaluate cause of self-care deficits   - Assess range of motion  - Assess patient's mobility; develop plan if impaired  - Assess patient's need for assistive devices and provide as appropriate  - Encourage maximum independence but intervene and supervise when necessary  - Involve family in performance of ADLs  - Assess for home care needs following discharge   - Consider OT consult to assist with ADL evaluation and planning for discharge  - Provide patient education as appropriate  Outcome: Progressing  Goal: Maintain or return mobility status to optimal level  Description  INTERVENTIONS:  - Assess patient's baseline mobility status (ambulation, transfers, stairs, etc )    - Identify cognitive and physical deficits and behaviors that affect mobility  - Identify mobility aids required to assist with transfers and/or ambulation (gait belt, sit-to-stand, lift, walker, cane, etc )  - [place] fall precautions as indicated by assessment  - Record patient progress and toleration of activity level on Mobility SBAR; progress patient to next Phase/Stage  - Instruct patient to call for assistance with activity based on assessment  - Consider rehabilitation consult to assist with strengthening/weightbearing, etc   Outcome: Progressing     Problem: DISCHARGE PLANNING  Goal: Discharge to home or other facility with appropriate resources  Description  INTERVENTIONS:  - Identify barriers to discharge w/patient and caregiver  - Arrange for needed discharge resources and transportation as appropriate  - Identify discharge learning needs (meds, wound care, etc )  - Arrange for interpretive services to assist at discharge as needed  - Refer to Case Management Department for coordinating discharge planning if the patient needs post-hospital services based on physician/advanced practitioner order or complex needs related to functional status, cognitive ability, or social support system  Outcome: Progressing     Problem: Potential for Falls  Goal: Patient will remain free of falls  Description  INTERVENTIONS:  - Assess patient frequently for physical needs  -  Identify cognitive and physical deficits and behaviors that affect risk of falls    -  [place] fall precautions as indicated by assessment   - Educate patient/family on patient safety including physical limitations  - Instruct patient to call for assistance with activity based on assessment  - Modify environment to reduce risk of injury  - Consider OT/PT consult to assist with strengthening/mobility  Outcome: Progressing

## 2020-01-08 VITALS
BODY MASS INDEX: 28.99 KG/M2 | OXYGEN SATURATION: 98 % | WEIGHT: 174 LBS | DIASTOLIC BLOOD PRESSURE: 93 MMHG | HEIGHT: 65 IN | TEMPERATURE: 97.5 F | HEART RATE: 66 BPM | RESPIRATION RATE: 18 BRPM | SYSTOLIC BLOOD PRESSURE: 134 MMHG

## 2020-01-08 PROCEDURE — 99024 POSTOP FOLLOW-UP VISIT: CPT | Performed by: OBSTETRICS & GYNECOLOGY

## 2020-01-08 RX ORDER — IBUPROFEN 600 MG/1
600 TABLET ORAL EVERY 6 HOURS PRN
Qty: 30 TABLET | Refills: 0 | Status: SHIPPED | OUTPATIENT
Start: 2020-01-08 | End: 2020-01-28

## 2020-01-08 RX ORDER — ACETAMINOPHEN 325 MG/1
650 TABLET ORAL EVERY 6 HOURS PRN
Qty: 30 TABLET | Refills: 0 | Status: SHIPPED | OUTPATIENT
Start: 2020-01-08 | End: 2020-01-28

## 2020-01-08 RX ADMIN — IBUPROFEN 600 MG: 600 TABLET ORAL at 01:35

## 2020-01-08 RX ADMIN — SERTRALINE HYDROCHLORIDE 100 MG: 100 TABLET ORAL at 08:00

## 2020-01-08 RX ADMIN — SENNOSIDES 8.6 MG: 8.6 TABLET, FILM COATED ORAL at 08:00

## 2020-01-08 RX ADMIN — IBUPROFEN 600 MG: 600 TABLET ORAL at 08:00

## 2020-01-08 NOTE — LACTATION NOTE
This note was copied from a baby's chart  Information on hand expression given  Discussed benefits of knowing how to manually express breast including stimulating milk supply, softening nipple for latch and evacuating breast in the event of engorgement  Verbally reviewed  positioning infant up at chest level and starting to feed infant with nose arriving at the nipple  Then, using areolar compression to achieve a deep latch that is comfortable and exchanges optimum amounts of milk  Mom states baby's latch is improving  Met with mother to go over feeding log since birth for the first week  Emphasized 8 or more (12) feedings in a 24 hour period, what to expect for the number of diapers per day of life and the progression of properties of the  stooling pattern  Discussed s/s that breastfeeding is going well after day 4 and when to get help from a pediatrician or lactation support person after day 4  Booklet included Breast Pumping Instructions, When You Go Back to Work or School, and Breastfeeding Resources for after discharge including access to the number for the [employer] [facility]  to call for assistance, questions and concerns  Extension number for inpatient lactation support provided

## 2020-01-08 NOTE — NURSING NOTE
Discharge instructions reviewed and explained with patient and   Both verbalized an understanding of all teachings at this time

## 2020-01-08 NOTE — PLAN OF CARE
Problem: PAIN - ADULT  Goal: Verbalizes/displays adequate comfort level or baseline comfort level  Description  Interventions:  - Encourage patient to monitor pain and request assistance  - Assess pain using appropriate pain scale  - Administer analgesics based on type and severity of pain and evaluate response  - Implement non-pharmacological measures as appropriate and evaluate response  - Consider cultural and social influences on pain and pain management  - Notify physician/advanced practitioner if interventions unsuccessful or patient reports new pain  1/8/2020 1321 by Marly Jimenez RN  Outcome: Completed  1/8/2020 0845 by Marly Jimenez RN  Outcome: Progressing     Problem: INFECTION - ADULT  Goal: Absence or prevention of progression during hospitalization  Description  INTERVENTIONS:  - Assess and monitor for signs and symptoms of infection  - Monitor lab/diagnostic results  - Monitor all insertion sites, i e  indwelling lines, tubes, and drains  - Monitor endotracheal if appropriate and nasal secretions for changes in amount and color  - [place] appropriate cooling/warming therapies per order  - Administer medications as ordered  - Instruct and encourage patient and family to use good hand hygiene technique  - Identify and instruct in appropriate isolation precautions for identified infection/condition  1/8/2020 1321 by Marly Jimenez RN  Outcome: Completed  1/8/2020 0845 by Marly Jimenez RN  Outcome: Progressing  Goal: Absence of fever/infection during neutropenic period  Description  INTERVENTIONS:  - Monitor WBC    1/8/2020 1321 by Marly Jimenez RN  Outcome: Completed  1/8/2020 0845 by Marly Jimenez RN  Outcome: Progressing     Problem: SAFETY ADULT  Goal: Patient will remain free of falls  Description  INTERVENTIONS:  - Assess patient frequently for physical needs  -  Identify cognitive and physical deficits and behaviors that affect risk of falls    -  [place] fall precautions as indicated by assessment   - Educate patient/family on patient safety including physical limitations  - Instruct patient to call for assistance with activity based on assessment  - Modify environment to reduce risk of injury  - Consider OT/PT consult to assist with strengthening/mobility  1/8/2020 1321 by Kasia Barros RN  Outcome: Completed  1/8/2020 0845 by Kasia Barros RN  Outcome: Progressing  Goal: Maintain or return to baseline ADL function  Description  INTERVENTIONS:  -  Assess patient's ability to carry out ADLs; assess patient's baseline for ADL function and identify physical deficits which impact ability to perform ADLs (bathing, care of mouth/teeth, toileting, grooming, dressing, etc )  - Assess/evaluate cause of self-care deficits   - Assess range of motion  - Assess patient's mobility; develop plan if impaired  - Assess patient's need for assistive devices and provide as appropriate  - Encourage maximum independence but intervene and supervise when necessary  - Involve family in performance of ADLs  - Assess for home care needs following discharge   - Consider OT consult to assist with ADL evaluation and planning for discharge  - Provide patient education as appropriate  1/8/2020 1321 by Kasia Barros RN  Outcome: Completed  1/8/2020 0845 by Kasia Barros RN  Outcome: Progressing  Goal: Maintain or return mobility status to optimal level  Description  INTERVENTIONS:  - Assess patient's baseline mobility status (ambulation, transfers, stairs, etc )    - Identify cognitive and physical deficits and behaviors that affect mobility  - Identify mobility aids required to assist with transfers and/or ambulation (gait belt, sit-to-stand, lift, walker, cane, etc )  - [place] fall precautions as indicated by assessment  - Record patient progress and toleration of activity level on Mobility SBAR; progress patient to next Phase/Stage  - Instruct patient to call for assistance with activity based on assessment  - Consider rehabilitation consult to assist with strengthening/weightbearing, etc   1/8/2020 1321 by Yu Zavala RN  Outcome: Completed  1/8/2020 0845 by Yu Zavala RN  Outcome: Progressing     Problem: DISCHARGE PLANNING  Goal: Discharge to home or other facility with appropriate resources  Description  INTERVENTIONS:  - Identify barriers to discharge w/patient and caregiver  - Arrange for needed discharge resources and transportation as appropriate  - Identify discharge learning needs (meds, wound care, etc )  - Arrange for interpretive services to assist at discharge as needed  - Refer to Case Management Department for coordinating discharge planning if the patient needs post-hospital services based on physician/advanced practitioner order or complex needs related to functional status, cognitive ability, or social support system  1/8/2020 1321 by Yu Zavala RN  Outcome: Completed  1/8/2020 0845 by Yu Zavala RN  Outcome: Progressing     Problem: Potential for Falls  Goal: Patient will remain free of falls  Description  INTERVENTIONS:  - Assess patient frequently for physical needs  -  Identify cognitive and physical deficits and behaviors that affect risk of falls    -  [place] fall precautions as indicated by assessment   - Educate patient/family on patient safety including physical limitations  - Instruct patient to call for assistance with activity based on assessment  - Modify environment to reduce risk of injury  - Consider OT/PT consult to assist with strengthening/mobility  1/8/2020 1321 by Yu Zavala RN  Outcome: Completed  1/8/2020 0845 by Yu Zavala RN  Outcome: Progressing

## 2020-01-08 NOTE — PLAN OF CARE
Problem: PAIN - ADULT  Goal: Verbalizes/displays adequate comfort level or baseline comfort level  Description  Interventions:  - Encourage patient to monitor pain and request assistance  - Assess pain using appropriate pain scale  - Administer analgesics based on type and severity of pain and evaluate response  - Implement non-pharmacological measures as appropriate and evaluate response  - Consider cultural and social influences on pain and pain management  - Notify physician/advanced practitioner if interventions unsuccessful or patient reports new pain  Outcome: Progressing     Problem: INFECTION - ADULT  Goal: Absence or prevention of progression during hospitalization  Description  INTERVENTIONS:  - Assess and monitor for signs and symptoms of infection  - Monitor lab/diagnostic results  - Monitor all insertion sites, i e  indwelling lines, tubes, and drains  - Monitor endotracheal if appropriate and nasal secretions for changes in amount and color  - [place] appropriate cooling/warming therapies per order  - Administer medications as ordered  - Instruct and encourage patient and family to use good hand hygiene technique  - Identify and instruct in appropriate isolation precautions for identified infection/condition  Outcome: Progressing  Goal: Absence of fever/infection during neutropenic period  Description  INTERVENTIONS:  - Monitor WBC    Outcome: Progressing     Problem: SAFETY ADULT  Goal: Patient will remain free of falls  Description  INTERVENTIONS:  - Assess patient frequently for physical needs  -  Identify cognitive and physical deficits and behaviors that affect risk of falls    -  [place] fall precautions as indicated by assessment   - Educate patient/family on patient safety including physical limitations  - Instruct patient to call for assistance with activity based on assessment  - Modify environment to reduce risk of injury  - Consider OT/PT consult to assist with strengthening/mobility  Outcome: Progressing  Goal: Maintain or return to baseline ADL function  Description  INTERVENTIONS:  -  Assess patient's ability to carry out ADLs; assess patient's baseline for ADL function and identify physical deficits which impact ability to perform ADLs (bathing, care of mouth/teeth, toileting, grooming, dressing, etc )  - Assess/evaluate cause of self-care deficits   - Assess range of motion  - Assess patient's mobility; develop plan if impaired  - Assess patient's need for assistive devices and provide as appropriate  - Encourage maximum independence but intervene and supervise when necessary  - Involve family in performance of ADLs  - Assess for home care needs following discharge   - Consider OT consult to assist with ADL evaluation and planning for discharge  - Provide patient education as appropriate  Outcome: Progressing  Goal: Maintain or return mobility status to optimal level  Description  INTERVENTIONS:  - Assess patient's baseline mobility status (ambulation, transfers, stairs, etc )    - Identify cognitive and physical deficits and behaviors that affect mobility  - Identify mobility aids required to assist with transfers and/or ambulation (gait belt, sit-to-stand, lift, walker, cane, etc )  - [place] fall precautions as indicated by assessment  - Record patient progress and toleration of activity level on Mobility SBAR; progress patient to next Phase/Stage  - Instruct patient to call for assistance with activity based on assessment  - Consider rehabilitation consult to assist with strengthening/weightbearing, etc   Outcome: Progressing     Problem: DISCHARGE PLANNING  Goal: Discharge to home or other facility with appropriate resources  Description  INTERVENTIONS:  - Identify barriers to discharge w/patient and caregiver  - Arrange for needed discharge resources and transportation as appropriate  - Identify discharge learning needs (meds, wound care, etc )  - Arrange for interpretive services to assist at discharge as needed  - Refer to Case Management Department for coordinating discharge planning if the patient needs post-hospital services based on physician/advanced practitioner order or complex needs related to functional status, cognitive ability, or social support system  Outcome: Progressing     Problem: Potential for Falls  Goal: Patient will remain free of falls  Description  INTERVENTIONS:  - Assess patient frequently for physical needs  -  Identify cognitive and physical deficits and behaviors that affect risk of falls    -  [place] fall precautions as indicated by assessment   - Educate patient/family on patient safety including physical limitations  - Instruct patient to call for assistance with activity based on assessment  - Modify environment to reduce risk of injury  - Consider OT/PT consult to assist with strengthening/mobility  Outcome: Progressing

## 2020-01-08 NOTE — PLAN OF CARE
Problem: PAIN - ADULT  Goal: Verbalizes/displays adequate comfort level or baseline comfort level  Description  Interventions:  - Encourage patient to monitor pain and request assistance  - Assess pain using appropriate pain scale  - Administer analgesics based on type and severity of pain and evaluate response  - Implement non-pharmacological measures as appropriate and evaluate response  - Consider cultural and social influences on pain and pain management  - Notify physician/advanced practitioner if interventions unsuccessful or patient reports new pain  Outcome: Progressing     Problem: INFECTION - ADULT  Goal: Absence or prevention of progression during hospitalization  Description  INTERVENTIONS:  - Assess and monitor for signs and symptoms of infection  - Monitor lab/diagnostic results  - Monitor all insertion sites, i e  indwelling lines, tubes, and drains  - Monitor endotracheal if appropriate and nasal secretions for changes in amount and color  - [place] appropriate cooling/warming therapies per order  - Administer medications as ordered  - Instruct and encourage patient and family to use good hand hygiene technique  - Identify and instruct in appropriate isolation precautions for identified infection/condition  Outcome: Progressing  Goal: Absence of fever/infection during neutropenic period  Description  INTERVENTIONS:  - Monitor WBC    Outcome: Progressing     Problem: SAFETY ADULT  Goal: Patient will remain free of falls  Description  INTERVENTIONS:  - Assess patient frequently for physical needs  -  Identify cognitive and physical deficits and behaviors that affect risk of falls    -  [place] fall precautions as indicated by assessment   - Educate patient/family on patient safety including physical limitations  - Instruct patient to call for assistance with activity based on assessment  - Modify environment to reduce risk of injury  - Consider OT/PT consult to assist with strengthening/mobility  Outcome: Progressing  Goal: Maintain or return to baseline ADL function  Description  INTERVENTIONS:  -  Assess patient's ability to carry out ADLs; assess patient's baseline for ADL function and identify physical deficits which impact ability to perform ADLs (bathing, care of mouth/teeth, toileting, grooming, dressing, etc )  - Assess/evaluate cause of self-care deficits   - Assess range of motion  - Assess patient's mobility; develop plan if impaired  - Assess patient's need for assistive devices and provide as appropriate  - Encourage maximum independence but intervene and supervise when necessary  - Involve family in performance of ADLs  - Assess for home care needs following discharge   - Consider OT consult to assist with ADL evaluation and planning for discharge  - Provide patient education as appropriate  Outcome: Progressing  Goal: Maintain or return mobility status to optimal level  Description  INTERVENTIONS:  - Assess patient's baseline mobility status (ambulation, transfers, stairs, etc )    - Identify cognitive and physical deficits and behaviors that affect mobility  - Identify mobility aids required to assist with transfers and/or ambulation (gait belt, sit-to-stand, lift, walker, cane, etc )  - [place] fall precautions as indicated by assessment  - Record patient progress and toleration of activity level on Mobility SBAR; progress patient to next Phase/Stage  - Instruct patient to call for assistance with activity based on assessment  - Consider rehabilitation consult to assist with strengthening/weightbearing, etc   Outcome: Progressing     Problem: DISCHARGE PLANNING  Goal: Discharge to home or other facility with appropriate resources  Description  INTERVENTIONS:  - Identify barriers to discharge w/patient and caregiver  - Arrange for needed discharge resources and transportation as appropriate  - Identify discharge learning needs (meds, wound care, etc )  - Arrange for interpretive services to assist at discharge as needed  - Refer to Case Management Department for coordinating discharge planning if the patient needs post-hospital services based on physician/advanced practitioner order or complex needs related to functional status, cognitive ability, or social support system  Outcome: Progressing     Problem: Potential for Falls  Goal: Patient will remain free of falls  Description  INTERVENTIONS:  - Assess patient frequently for physical needs  -  Identify cognitive and physical deficits and behaviors that affect risk of falls    -  [place] fall precautions as indicated by assessment   - Educate patient/family on patient safety including physical limitations  - Instruct patient to call for assistance with activity based on assessment  - Modify environment to reduce risk of injury  - Consider OT/PT consult to assist with strengthening/mobility  Outcome: Progressing

## 2020-01-14 LAB — PLACENTA IN STORAGE: NORMAL

## 2020-01-28 ENCOUNTER — OFFICE VISIT (OUTPATIENT)
Dept: FAMILY MEDICINE CLINIC | Facility: CLINIC | Age: 30
End: 2020-01-28
Payer: COMMERCIAL

## 2020-01-28 VITALS
BODY MASS INDEX: 25.66 KG/M2 | WEIGHT: 154 LBS | HEART RATE: 84 BPM | RESPIRATION RATE: 20 BRPM | HEIGHT: 65 IN | DIASTOLIC BLOOD PRESSURE: 68 MMHG | OXYGEN SATURATION: 98 % | TEMPERATURE: 97.9 F | SYSTOLIC BLOOD PRESSURE: 108 MMHG

## 2020-01-28 DIAGNOSIS — F41.9 ANXIETY: ICD-10-CM

## 2020-01-28 DIAGNOSIS — F32.A DEPRESSION, UNSPECIFIED DEPRESSION TYPE: ICD-10-CM

## 2020-01-28 DIAGNOSIS — Z00.00 ROUTINE GENERAL MEDICAL EXAMINATION AT A HEALTH CARE FACILITY: Primary | ICD-10-CM

## 2020-01-28 DIAGNOSIS — G56.03 BILATERAL CARPAL TUNNEL SYNDROME: ICD-10-CM

## 2020-01-28 DIAGNOSIS — Z34.93 THIRD TRIMESTER PREGNANCY: ICD-10-CM

## 2020-01-28 PROCEDURE — 99385 PREV VISIT NEW AGE 18-39: CPT | Performed by: NURSE PRACTITIONER

## 2020-01-28 PROCEDURE — 99214 OFFICE O/P EST MOD 30 MIN: CPT | Performed by: NURSE PRACTITIONER

## 2020-01-28 PROCEDURE — 3008F BODY MASS INDEX DOCD: CPT | Performed by: NURSE PRACTITIONER

## 2020-01-28 RX ORDER — DOCUSATE SODIUM 100 MG/1
100 CAPSULE, LIQUID FILLED ORAL 2 TIMES DAILY PRN
COMMUNITY

## 2020-01-28 RX ORDER — SERTRALINE HYDROCHLORIDE 100 MG/1
100 TABLET, FILM COATED ORAL DAILY
Qty: 90 TABLET | Refills: 1 | Status: SHIPPED | OUTPATIENT
Start: 2020-01-28 | End: 2020-10-19 | Stop reason: SDUPTHER

## 2020-01-28 NOTE — PROGRESS NOTES
ADULT ANNUAL [facility]    NAME: Katlin Meneses  AGE: 30 y o  SEX: female  : 1990     DATE: 2020     Assessment and Plan:     Problem List Items Addressed This Visit        Other    Depression - Primary    Relevant Medications    sertraline (ZOLOFT) 100 mg tablet      Other Visit Diagnoses     Bilateral carpal tunnel syndrome        Relevant Orders    Ambulatory referral to Orthopedic Surgery    Third trimester pregnancy        Relevant Medications    sertraline (ZOLOFT) 100 mg tablet        Immunizations and preventive care screenings were discussed with patient today  Appropriate education was printed on patient's after visit summary  Counseling:  Alcohol/drug use: discussed moderation in alcohol intake, the recommendations for healthy alcohol use, and avoidance of illicit drug use  Dental Health: discussed importance of regular tooth brushing, flossing, and dental visits  Injury prevention: discussed safety/seat belts, safety helmets, smoke detectors, carbon dioxide detectors, and smoking near bedding or upholstery  Sexual health: discussed sexually transmitted diseases, partner selection, use of condoms, avoidance of unintended pregnancy, and contraceptive alternatives  · Exercise: the importance of regular exercise/physical activity was discussed  Recommend exercise 3-5 times per week for at least 30 minutes  BMI Counseling: Body mass index is 25 63 kg/m²  The BMI is above normal  Nutrition recommendations include encouraging healthy choices of fruits and vegetables, consuming healthier snacks, moderation in carbohydrate intake, increasing intake of lean protein, reducing intake of saturated and trans fat and reducing intake of cholesterol  No follow-ups on file       Chief Complaint:     Chief Complaint   Patient presents with    Establish Care      History of Present Illness:     Adult Annual Physical Patient here for a comprehensive physical exam  The patient reports no problems  Diet and Physical Activity  · Diet/Nutrition: well balanced diet, heart healthy (low sodium) diet, low fat diet and consuming 3-5 servings of fruits/vegetables daily  · Exercise: no formal exercise  Depression Screening  PHQ-9 Depression Screening    PHQ-9:    Frequency of the following problems over the past two weeks:       Little interest or pleasure in doing things:  0 - not at all  Feeling down, depressed, or hopeless:  0 - not at all  PHQ-2 Score:  0       General Health  · Sleep: sleeps poorly and gets 4-6 hours of sleep on average  · Hearing: normal - bilateral   · Vision: most recent eye exam >1 year ago and decreased vision on right eye  · Dental: regular dental visits  /GYN Health  · Last menstrual period: recent vaginal birth 3 weeks post partum  · History of STDs?: no      Review of Systems:     Review of Systems   Constitutional: Negative for activity change, appetite change, chills, fatigue and fever  HENT: Negative for congestion, ear pain, nosebleeds, rhinorrhea and sore throat  Eyes: Negative for photophobia, pain, redness and visual disturbance  Respiratory: Negative for cough, shortness of breath and wheezing  Cardiovascular: Negative  Negative for chest pain  Gastrointestinal: Negative  Negative for abdominal pain, constipation, diarrhea and vomiting  Endocrine: Negative  Genitourinary: Negative for difficulty urinating, dysuria and flank pain  Breast skin tag right   Musculoskeletal: Negative  Wrist pain   Skin: Negative for color change and rash  Neurological: Negative for dizziness, weakness, numbness and headaches  Hematological: Negative for adenopathy  Psychiatric/Behavioral: Negative for agitation and confusion  The patient is not nervous/anxious           Anxiety      Past Medical History:     Past Medical History:   Diagnosis Date    Anemia anemia of pregnancy    Anxiety     Complication of anesthesia     extreme nausea    Diabetes mellitus (HCC)     A2GDM    Urinary tract infection     Varicella     had as child      Past Surgical History:     Past Surgical History:   Procedure Laterality Date    APPENDECTOMY      DILATION AND EVACUATION      TONSILLECTOMY      WISDOM TOOTH EXTRACTION        Social History:     Social History     Socioeconomic History    Marital status: /Civil Union     Spouse name: Not on file    Number of children: Not on file    Years of education: Not on file    Highest education level: Not on file   Occupational History    Not on file   Social Needs    Financial resource strain: Not on file    Food insecurity:     Worry: Not on file     Inability: Not on file    Transportation needs:     Medical: Not on file     Non-medical: Not on file   Tobacco Use    Smoking status: Former Smoker     Types: Cigarettes     Last attempt to quit: 1/1/2017     Years since quitting: 3 0    Smokeless tobacco: Never Used   Substance and Sexual Activity    Alcohol use: Yes     Comment: socially prior to knowledge of pregnancy    Drug use: Never    Sexual activity: Yes     Partners: Male   Lifestyle    Physical activity:     Days per week: Not on file     Minutes per session: Not on file    Stress: Not on file   Relationships    Social connections:     Talks on phone: Not on file     Gets together: Not on file     Attends [employer] service: Not on file     Active member of club or organization: Not on file     Attends meetings of clubs or organizations: Not on file     Relationship status: Not on file    Intimate partner violence:     Fear of current or ex partner: Not on file     Emotionally abused: Not on file     Physically abused: Not on file     Forced sexual activity: Not on file   Other Topics Concern    Not on file   Social History Narrative    Not on file      Family History:     Family History   Problem Relation Age of Onset    Depression Mother     Heart attack Father         x5    No Known Problems Sister     Heart defect Brother         mitral valve cleft - septal defect - had sugery as child    Cancer Maternal Grandmother     Lymphoma Maternal Grandmother     Lung cancer Maternal Grandmother     Stroke Maternal Grandmother     Diabetes Paternal Grandmother     Kidney failure Paternal Grandmother     Heart attack Paternal Grandmother     Heart attack Paternal Grandfather     No Known Problems Brother       Current Medications:     Current Outpatient Medications   Medication Sig Dispense Refill    docusate sodium (COLACE) 100 mg capsule Take 100 mg by mouth 2 (two) times a day      sertraline (ZOLOFT) 100 mg tablet Take 1 tablet (100 mg total) by mouth daily 90 tablet 1     No current facility-administered medications for this visit  Allergies:      Allergies   Allergen Reactions    Hydrocodone-Acetaminophen Other (See Comments) and GI Intolerance     4 hours after wisdom tooth extraction, patient took pain pill and started spinning, vomiting, and sweating - unsure if related to pain medication or anesthesia       Physical Exam:     /68   Pulse 84   Temp 97 9 °F (36 6 °C) (Tympanic)   Resp 20   Ht 5' 5" (1 651 m)   Wt 69 9 kg (154 lb)   LMP 04/05/2019   SpO2 98%   BMI 25 63 kg/m²     Physical Exam    EMMY Dudley   [facility]

## 2020-01-28 NOTE — PROGRESS NOTES
[facility]        NAME: Sunny Pretty is a 30 y o  female  : 1990    MRN: [id number]  DATE: 2020  TIME: 2:23 PM    Assessment and Plan   Routine general medical examination at a health care facility [Z00 00]  1  Routine general medical examination at a health care facility     2  Depression, unspecified depression type     3  Bilateral carpal tunnel syndrome  Ambulatory referral to Orthopedic Surgery   4  Third trimester pregnancy  sertraline (ZOLOFT) 100 mg tablet   5  Anxiety       Instructed to cover skin tag while breastfeed to decrease irritation  Patient Instructions     There are no Patient Instructions on file for this visit  Chief Complaint     Chief Complaint   Patient presents with    Establish Care         History of Present Illness       Patient is here to establish care  Patient is 3 weeks post-partum     -Bilateral wrist pain-began when patient was in 3rd trimester of pregnancy-numbness continuously on fingers of right hand-left wrist has continuous pain and intermittent paresthesias using brace on left wrist which has helped pain  Referral to orthopedics made  Patient also educated to get brace for right wrist      -Anxiety-switched from Lexapro to Zoloft during pregnancy-has been taking for the past few years  Reports anxiety is well controlled on current dose  Denies palpitations, SOB,CP, irritability and panic attacks      -Right nipple skin tag-areola bump that developed during pregnancy-becomes irritated while breastfeeding-no bleeding or redness noted-the skin becomes very irritated in that area  Has been applying neosporin topically to area  Patient told to follow up on tag with OB/GYN at next appointment  Review of Systems   Review of Systems   Constitutional: Negative for activity change, appetite change, chills, fatigue and fever  HENT: Negative for congestion, ear pain, nosebleeds, rhinorrhea and sore throat  Eyes: Negative for photophobia, pain, redness and visual disturbance  Respiratory: Negative for cough, shortness of breath and wheezing  Cardiovascular: Negative  Negative for chest pain  Gastrointestinal: Negative  Negative for abdominal pain, constipation, diarrhea and vomiting  Endocrine: Negative  Genitourinary: Negative for difficulty urinating, dysuria and flank pain  Musculoskeletal: Negative  Bilateral wrist pain and paraesthesias in finger tips    Skin: Negative for color change and rash  Neurological: Negative for dizziness, weakness, numbness and headaches  Hematological: Negative for adenopathy  Psychiatric/Behavioral: Negative for agitation and confusion  The patient is not nervous/anxious          PHQ-9 Depression Screening    PHQ-9:    Frequency of the following problems over the past two weeks:       Little interest or pleasure in doing things:  0 - not at all  Feeling down, depressed, or hopeless:  0 - not at all  PHQ-2 Score:  0        Current Medications       Current Outpatient Medications:     docusate sodium (COLACE) 100 mg capsule, Take 100 mg by mouth 2 (two) times a day, Disp: , Rfl:     sertraline (ZOLOFT) 100 mg tablet, Take 1 tablet (100 mg total) by mouth daily, Disp: 90 tablet, Rfl: 1    Current Allergies     Allergies as of 01/28/2020 - Reviewed 01/06/2020   Allergen Reaction Noted    Hydrocodone-acetaminophen Other (See Comments) and GI Intolerance 10/09/2018            The following portions of the patient's history were reviewed and updated as appropriate: allergies, current medications, past family history, past medical history, past social history, past surgical history and problem list      Past Medical History:   Diagnosis Date    Anemia     anemia of pregnancy    Anxiety     Complication of anesthesia     extreme nausea    Diabetes mellitus ([facility] [address] )     A2GDM    Urinary tract infection     Varicella     had as child       Past Surgical History:   Procedure Laterality Date    APPENDECTOMY      DILATION AND EVACUATION      TONSILLECTOMY      WISDOM TOOTH EXTRACTION         Family History   Problem Relation Age of Onset    Depression Mother     Heart attack Father         x5    No Known Problems Sister     Heart defect Brother         mitral valve cleft - septal defect - had sugery as child    Cancer Maternal Grandmother     Lymphoma Maternal Grandmother     Lung cancer Maternal Grandmother     Stroke Maternal Grandmother     Diabetes Paternal Grandmother     Kidney failure Paternal Grandmother     Heart attack Paternal Grandmother     Heart attack Paternal Grandfather     No Known Problems Brother          Medications have been verified  Objective   /68   Pulse 84   Temp 97 9 °F (36 6 °C) (Tympanic)   Resp 20   Ht 5' 5" (1 651 m)   Wt 69 9 kg (154 lb)   LMP 04/05/2019   SpO2 98%   BMI 25 63 kg/m²        Physical Exam     Physical Exam   Constitutional: She is oriented to person, place, and time  She appears well-developed and well-nourished  She is cooperative  She does not appear ill  No distress  Eyes: Lids are normal    Cardiovascular: Normal rate, regular rhythm, S1 normal, S2 normal, normal heart sounds and intact distal pulses  Exam reveals no gallop and no friction rub  No murmur heard  Pulmonary/Chest: Effort normal and breath sounds normal  No respiratory distress  She has no decreased breath sounds  She has no wheezes  Abdominal: Normal appearance  Musculoskeletal: Normal range of motion  She exhibits no edema, tenderness or deformity  Pain in bilateral wrists with associated paraesthesias in fingertips   Negative Tinel and Phalen test    Neurological: She is alert and oriented to person, place, and time  Skin: Skin is warm  No rash noted  No erythema  Skin tag on right areola area which causes pain and discomfort     Psychiatric: She has a normal mood and affect   Her behavior is normal  Thought content normal    Nursing note and vitals reviewed

## 2020-01-31 ENCOUNTER — TELEPHONE (OUTPATIENT)
Dept: POSTPARTUM | Facility: CLINIC | Age: 30
End: 2020-01-31

## 2020-01-31 NOTE — TELEPHONE ENCOUNTER
Tena Waller has been feeding pretty frequently for most of her life with little exception  Her output is appropriate and she is gaining weight well per [place] Presume  There are some days where she is content and will feed about every two hours  But most of the time she feeds at least hourly and comes on and off the breast repeatedly  She also bites down on the nipple frequently and [place] Presume has blanching and deep purple coloring of her nipples  [place] Presume is not comfortable pumping and bottle feeding at this time and chooses to continue with her current plan until she is seen  We will follow up as scheduled

## 2020-01-31 NOTE — TELEPHONE ENCOUNTER
Spoke with Bladimir Joshua - daughter Mushtaq Kay (3 wk/old) - for the past week she has been cluster feeding every 30 min  Coughing & pulling away - Ok Tres thought she might be getting too much milk at one time - she tried leaning back more keeping head level with chest   Also issues of Jinny clamping down on her nipple & pulling away stretching it out - so at this point her nipples are very sore & is painful to breast feed  Bladimir Joshua is unsure what has happened, said had issue with sore nipples in the beginning, but had gotten better - but now this is happening       Appt is set for Monday 2/3

## 2020-02-02 ENCOUNTER — OFFICE VISIT (OUTPATIENT)
Dept: URGENT CARE | Facility: CLINIC | Age: 30
End: 2020-02-02
Payer: COMMERCIAL

## 2020-02-02 VITALS
BODY MASS INDEX: 24.99 KG/M2 | HEART RATE: 74 BPM | DIASTOLIC BLOOD PRESSURE: 65 MMHG | HEIGHT: 65 IN | SYSTOLIC BLOOD PRESSURE: 116 MMHG | TEMPERATURE: 97.6 F | OXYGEN SATURATION: 98 % | WEIGHT: 150 LBS | RESPIRATION RATE: 18 BRPM

## 2020-02-02 DIAGNOSIS — O92.29 PAIN OF BREAST DURING BREASTFEEDING: ICD-10-CM

## 2020-02-02 DIAGNOSIS — N61.0 MASTITIS, LEFT, ACUTE: Primary | ICD-10-CM

## 2020-02-02 DIAGNOSIS — Z91.89 AT RISK FOR BREASTFEEDING DIFFICULTY: ICD-10-CM

## 2020-02-02 PROCEDURE — G0382 LEV 3 HOSP TYPE B ED VISIT: HCPCS | Performed by: NURSE PRACTITIONER

## 2020-02-02 RX ORDER — CEPHALEXIN 500 MG/1
500 CAPSULE ORAL 4 TIMES DAILY
Qty: 40 CAPSULE | Refills: 0 | Status: SHIPPED | OUTPATIENT
Start: 2020-02-02 | End: 2020-02-10 | Stop reason: ALTCHOICE

## 2020-02-03 ENCOUNTER — OFFICE VISIT (OUTPATIENT)
Dept: POSTPARTUM | Facility: CLINIC | Age: 30
End: 2020-02-03
Payer: COMMERCIAL

## 2020-02-03 DIAGNOSIS — R52 PAIN AGGRAVATED BY BREAST FEEDING: ICD-10-CM

## 2020-02-03 DIAGNOSIS — Z71.89 ENCOUNTER FOR BREAST FEEDING COUNSELING: Primary | ICD-10-CM

## 2020-02-03 DIAGNOSIS — O92.79 PAIN AGGRAVATED BY BREAST FEEDING: ICD-10-CM

## 2020-02-03 PROCEDURE — 99404 PREV MED CNSL INDIV APPRX 60: CPT | Performed by: PEDIATRICS

## 2020-02-03 NOTE — PROGRESS NOTES
INITIAL BREAST FEEDING EVALUATION    Informant/Relationship: Archana    Discussion of General Lactation Issues: Breastfeeding is painful for Nereida Feng  She had pain early which resolved quickly but has been an ongoing issue for several weeks  She has nipple damage and yesterday was diagnosed with mastitis (her breast was pink and felt hot  She denies chills or fever)   She has not begun taking the antibiotics yet as she is feeling better  She also has pain in her nipples as well as blanching of her nipples after feeding and at "random times"  Clarissa Snider also feeds frequently (hourly most of the time)  Infant is 2 weeks old today          History:  Fertility Problem:no  Breast changes:yes - areola got larger and darker, breasts got slightly molina  : yes - induced due to maternal hx of GDM, vacuum assisted delivery  Full term:yes - 39 weeks 3 days   labor:no  First nursing/attempt < 1 hour after birth:yes - baby latched  Skin to skin following delivery:yes - with a brief interruption to evaluate baby  Breast changes after delivery:yes - milk came in on day 3  Rooming in (infant in room with mother with exception of procedures, eg  Circumcision: yes - stayed with mother at all times  Blood sugar issues:no  NICU stay:no  Jaundice:no  Phototherapy:no  Supplement given: (list supplement and method used as well as reason(s):no    Past Medical History:   Diagnosis Date    Anemia     anemia of pregnancy    Anxiety     Complication of anesthesia     extreme nausea    Diabetes mellitus ([facility] [address] )     A2GDM    Urinary tract infection     Varicella     had as child         Current Outpatient Medications:     cephalexin (KEFLEX) 500 mg capsule, Take 1 capsule (500 mg total) by mouth 4 (four) times a day for 10 days, Disp: 40 capsule, Rfl: 0    docusate sodium (COLACE) 100 mg capsule, Take 100 mg by mouth 2 (two) times a day, Disp: , Rfl:     sertraline (ZOLOFT) 100 mg tablet, Take 1 tablet (100 mg total) by mouth daily, Disp: 90 tablet, Rfl: 1    Allergies   Allergen Reactions    Hydrocodone-Acetaminophen Other (See Comments) and GI Intolerance     4 hours after wisdom tooth extraction, patient took pain pill and started spinning, vomiting, and sweating - unsure if related to pain medication or anesthesia        Social History     Substance and Sexual Activity   Drug Use Never       Social History Former smoker    Interval Breastfeeding History:    Frequency of breast feeding: Feeds hourly or more most of the time  Sometimes will sleep up to 2 hours  Does mother feel breastfeeding is effective: No  Does infant appear satisfied after nursing:No  Stooling pattern normal: Yes  Urinating frequently:Yes  Using shield or shells: No    Alternative/Artificial Feedings:   Bottle: Yes  For the first time this weekend  Cup: No  Syringe/Finger: No           Formula Type: none                     Amount: n/a            Breast Milk:                      Amount: 1 ounce            Frequency Q 1 Hr between feedings  Elimination Problems: No      Equipment:  Nipple Shield             Type: none             Size: n/a             Frequency of Use: n/a  Pump            Type: Spectra S2            Frequency of Use: Occasionally  Shells            Type: none            Frequency of use: n/a    Equipment Problems: no    Mom:  Breast: Medium sized symmetrical breasts  Left breast faintly pink on the upper medial quadrant  No tenderness  No palpable firm areas  Deep color of the face of the nipples with blanching in the center after feeding  No pain at that time but Bashir Rm reports that frequently she has nipple pain associated with the color change  Nipple Assessment in General: Everted nipples bilaterally  Sensitive to touch  Mother's Awareness of Feeding Cues                 Recognizes:  Yes                  Verbalizes: Yes  Support System: FOB, extended family  History of Breastfeeding: none  Changes/Stressors/Violence: Bashir Rm is stressed by her pain and the frequency of feedings  Concerns/Goals: Gerry Lanza wants to resolve her pain and continue breastfeeding  Problems with Mom: Painful damaged nipples  Current mastitis  Physical Exam   Constitutional: She is oriented to person, place, and time  She appears well-developed and well-nourished  HENT:   Head: Normocephalic and atraumatic  Neck: Normal range of motion  Neck supple  Cardiovascular: Normal rate, regular rhythm and normal heart sounds  Pulmonary/Chest: Effort normal and breath sounds normal    Musculoskeletal: Normal range of motion  She exhibits no edema  Neurological: She is alert and oriented to person, place, and time  Skin: Skin is warm and dry  Psychiatric: She has a normal mood and affect  Her behavior is normal  Judgment and thought content normal        Infant:  Behaviors: Alert  Color: Pink  Birth weight: 3487gram  Current weight: 4370gram    Problems with infant: Frequent feeding  Frequently pulls on the nipple while feeding  Trouble maintaining latch      General Appearance:  Alert, active, no distress                            Head:  Normocephalic, AFOF, sutures opposed  Area of erythema on the scalp at the crown  Eyes:   Conjunctiva clear, no drainage                            Ears:   Normally placed, no anomolies                           Nose:   no drainage or erythema                          Mouth:  No lesions  Tongue extends to the lower lip but tip distorts and there is a deep central groove in the tongue  Tongue does not lateralize well  Edges of the tongue curl up when crying to form a bowl  Moderate cupping of my finger while sucking with frequent humping of the posterior tongue    Lingual frenulum is short and attached near the tip of the tongue                   Neck:  Prefers her head turned to her right side and some resistance when passively turning her head to the left side, symmetrical, trachea midline Respiratory:  No grunting, flaring, retractions, breath sounds clear and equal           Cardiovascular:  Regular rate and rhythm  No murmur  Adequate perfusion/capillary refill  Femoral pulse present                  Abdomen:    Soft, non-tender, no masses, bowel sounds present, no HSM            Genitourinary:  Normal female genitalia                         Spine:   No abnormalities noted       Musculoskeletal:   Full range of motion         Skin/Hair/Nails:   Skin warm, dry, and intact, no rashes or abnormal dyspigmentation or lesions               Neurologic:   No abnormal movement, tone appropriate    [place] Latch:  Efficiency:               Lips Flanged: Yes, after repositioning              Depth of latch: narrow initially              Audible Swallow: Yes, during letdown              Visible Milk: Yes              Wide Open/ Asymmetrical: No              Suck Swallow Cycle: Breathing: unlabored, Coordinated: yes  Nipple Assessment after latch: Normal: elongated/eraser, nipples were briefly a dark purple color after the feeding  Allan Gottlieb had stinging pain in the left nipple after feeding there for the second time  Latch Problems: There were some issues with positioning initially but even after repositioning, Jinny struggled to open wide to latch and when she did, she quickly pulled back onto the nipple  During the feeding Allan Gottlieb reported her pain was a "3" which is better than she typically experiences  Throughout the feeding, she needed to support her breast as eHather Amin could not maintain latch without it  Jinny's chin frequently quivered during the feeding and she would periodically pull on the nipple and clamp down  She fed contentedly during letdown on each breast and then quickly became frustrated  She fed on three breasts during this feeding until she was content  Jinny transferred 70grams of milk during the feeding       Position:  Infant's Ergonomics/Body               Body Alignment: Yes, after repositioning               Head Supported: Yes               Close to Mom's body/ Lifted/ Supported: Yes               Mom's Ergonomics/Body: Yes                           Supported: Yes                           Sitting Back: Yes                           Brings Baby to her breast: Yes  Positioning Problems: Initially Archana positioned Jinny with her head tilted forward and her mouth at the nipple  After repositioning, Clarissa Snider was able to tip her head slightly back and open her mouth a little wider to latch  Handouts:   Paced bottle feeding, Hands on pumping, Hand expression and Latch Check List    Education:  Reviewed Latch: Discussed how a wide asymmetric latch improves maternal comfort and milk transfer  Reviewed Positioning for Dyad: Demonstrated how to gently compress the breast and align the baby so that her nose is just above the nipple with her lower lip and chin touching the breast to encourage the deepest, widest, off-center latch  Reviewed Alternative/Artificial Feedings: Discussed and demonstrated paced bottle feeding  Reviewed Mom/Breast care: Discussed using warmth to alleviate pain from vasospasm  Discussed waiting on antibiotics for mastitis at this time if Nereida Feng chooses as she has been feeling better without treatment  Reviewed Equipment: Discussed the use and features of the Spectra S2 and the elements of hands on pumping  Plan:  Feed on demand either at the breast or expressed milk via paced bottle feeding  Effective hands on pumping when not feeding at the breast   Warmth to breasts to alleviate pain from vasospasm  Consider PT/OT/speech evaluation for additional support with Jinny's breastfeeding difficulty  Follow up as scheduled  I have spent 90 minutes with Patient and family today in which greater than 50% of this time was spent in counseling/coordination of care regarding Patient and family education

## 2020-02-03 NOTE — PATIENT INSTRUCTIONS
Take the keflex as ordered until completed  Eat yogurt or take a probiotic to restore good bacteria to your gut; this helps prevent stomach irritation/diarrhea while on an antibiotic  Keep your lactation consultant appointment tomorrow  I suggest writing down your questions ahead of time  Make sure baby is checked for possible lip and/or tongue ties  "[employer]" is an excellent free eliel/website to look up medication information and effects on pregnancy, lactation, milk supply and baby   "kellymom  com" is a great breastfeeding resource  Mastitis   AMBULATORY CARE:   Mastitis  is an infection of breast tissue that most often occurs in women who breastfeed  It can happen any time during breastfeeding, but usually occurs within the first 3 months after giving birth  Usually only one breast is affected  Common signs and symptoms include the following:   · Chills and fever    · Breast swelling, redness, warmth, and tenderness     · Tenderness under your arm    · Fatigue and body aches  Contact your healthcare provider if:   · Your symptoms do not get better within 2 days  · You have a painful lump in your breast      · You have swollen and tender lymph nodes in your armpit on the same side as the affected breast     · You have questions or concerns about your condition or care  Treatment:  You can continue to breastfeed your baby while you are being treated for mastitis  Breastfeeding when you have mastitis may help speed your recovery  You may need any of the following:  · Antibiotics  help treat or prevent a bacterial infection  · Acetaminophen  decreases pain and fever  It is available without a doctor's order  Ask how much to take and how often to take it  Follow directions  Acetaminophen can cause liver damage if not taken correctly  · NSAIDs , such as ibuprofen, help decrease swelling, pain, and fever  This medicine is available with or without a doctor's order   NSAIDs can cause stomach bleeding or kidney problems in certain people  If you take blood thinner medicine, always ask your healthcare provider if NSAIDs are safe for you  Always read the medicine label and follow directions  · Incision and drainage  may be needed if the swelling does not go away and an abscess forms  Your healthcare provider will make a small incision in your breast to drain the pus  Manage your symptoms:   · Continue to breastfeed from the affected breast   This will help to prevent an abscess from forming  Breastfeed your baby on the affected side first  Apply a warm, wet cloth on your breast or take a warm shower before you feed your baby  This can help increase your milk flow  If it is painful when you breastfeed from the affected breast, feed your baby from the other breast first  Pump the affected side to completely drain your breast after breastfeeding, if needed  You may save the pumped milk to feed your baby  · Use different positions to breastfeed  Change the position of your baby during feedings  This may help to relieve your discomfort  · Apply heat on your breast for 20 to 30 minutes every 2 hours for as many days as directed  Heat helps decrease pain  · Apply ice after feedings  Apply ice on your breast for 15 to 20 minutes every hour or as directed  Use an ice pack, or put crushed ice in a plastic bag  Cover it with a towel  Ice helps prevent tissue damage and decreases swelling and pain  · Massage your breast   Gently massage your breast before and during breastfeeding to help drain your milk  · Drink liquids as directed  Ask how much liquid to drink each day and which liquids are best for you  · Rest as needed  Do not sleep on your stomach until your infection is gone  Prevent mastitis:   · Breastfeed every 2 or 3 hours to prevent engorgement    Breast engorgement develops when too much milk builds up in your breast  Take your time when you breastfeed to allow your baby to empty your breast  Try not to switch breasts too early  Express or pump after you breastfeed if your baby is not emptying your breasts when he feeds  · Prevent sore and cracked nipples  A good latch prevents sore and cracked nipples  If you have sore nipples after breastfeeding, your baby may not be latched on properly  Gently break suction and reposition if your baby is only sucking on the nipple  Talk to a lactation consultant if you need help with your baby's latch  · Care for your breasts  Keep your nipples clean and dry between feedings  Check them for cracks, blisters, or other irritated areas  Ask a lactation specialist or your healthcare provider how to treat sore and cracked nipples  Wash your hands before and after you breastfeed your baby or pump your breasts  Wear a comfortable nursing bra that supports your breasts but is not too tight  Follow up with your healthcare provider as directed:  Write down your questions so you remember to ask them during your visits  © 2017 [facility] Information is for End User's use only and may not be sold, redistributed or otherwise used for commercial purposes  All illustrations and images included in CareNotes® are the copyrighted property of A D A M , Inc  or Nilesh Donald  The above information is an  only  It is not intended as medical advice for individual conditions or treatments  Talk to your doctor, nurse or pharmacist before following any medical regimen to see if it is safe and effective for you  Breastfeeding and Nipple Soreness   AMBULATORY CARE:   Sore or tender nipples  may happen when you start to breastfeed  Nipple soreness is usually caused by incorrect latch and positioning  After you have found a position that works and your baby has a good latch, breastfeeding should be comfortable  You should not have pain, blisters, or bruises when you breastfeed   Your nipples should not crack, scab, or bleed  Engorgement may also cause nipple soreness  Regular breastfeeding or expressing breast milk can ease engorgement and help with nipple soreness  Seek care immediately if:   · One or both of your breasts is red, swollen or hard, painful, and feels warm or hot  · Your nipple has pus coming out of it  Contact your healthcare provider if:   · You have a fever  · Your nipple is red, dry, cracked, bleeding, or they have scabs on them  · You see or feel a tender lump in your breast      · You have questions or concerns about your condition or care  Manage nipple soreness:   · Help your baby get a good latch  Gently break suction and reposition if your baby is only sucking on the nipple  Talk to a lactation consultant if you need help with your baby's latch  Offer your baby the nipple that is less sore first  Your baby's suction is usually harder when he first starts breastfeeding  Breastfeed 8 to 12 times each day  Frequent breastfeeding can help decrease nipple soreness and prevent engorgement  · Change positions each time you breastfeed  This may help to keep pressure off of sore areas  · Apply a few drops of breast milk to your nipples  Breast milk is naturally soothing  Do not wear bras or clothes that are tight and put pressure on your nipples  · Wash your nipples with warm water only  Do not use soaps that contain alcohol or other chemicals  These can dry and irritate your nipples  Do not use nursing pads that are lined with plastic  Let your nipples air dry after you bathe or breastfeed  Wear loose, soft cotton shirts  · Ask your healthcare provider about the best cream or ointment for you  Change nursing pads often to keep moisture away from your nipples  Ask about medicines to help decrease pain and swelling      · Use a breast pump if it is too painful to breastfeed  until your nipples heal   Follow up with your healthcare provider as directed:  Write down your questions so you remember to ask them during your visits  For more information:   · [employer] [address] [place], [place] [address]  Phone: 5- 216 - 022-0218  Web Address: http://Etohum/  · [facility]  [address]   [facility] Adelaida Hopkins  Phone: 8- 234 - 850-3045  Phone: 3- 536 - 499-1890  Web Address: http://www [facility] [place]/  org  © 2017 [address] [facility] Information is for End User's use only and may not be sold, redistributed or otherwise used for commercial purposes  All illustrations and images included in CareNotes® are the copyrighted property of [employer]  or Nilesh Donald  The above information is an  only  It is not intended as medical advice for individual conditions or treatments  Talk to your doctor, nurse or pharmacist before following any medical regimen to see if it is safe and effective for you

## 2020-02-03 NOTE — PROGRESS NOTES
[facility]        NAME: Alaina Balderas is a 30 y o  female  : 1990    MRN: [id number]  DATE: 2020  TIME: 7:49 PM    Assessment and Plan   Mastitis, left, acute [N61 0]  1  Mastitis, left, acute  cephalexin (KEFLEX) 500 mg capsule   2  Pain of breast during breastfeeding     3  At risk for breastfeeding difficulty           Patient Instructions     Patient Instructions   Take the keflex as ordered until completed  Eat yogurt or take a probiotic to restore good bacteria to your gut; this helps prevent stomach irritation/diarrhea while on an antibiotic  Keep your lactation consultant appointment tomorrow  I suggest writing down your questions ahead of time  Make sure baby is checked for possible lip and/or tongue ties  "[employer]" is an excellent free eliel/website to look up medication information and effects on pregnancy, lactation, milk supply and baby   "kellymom  com" is a great breastfeeding resource  Mastitis   AMBULATORY CARE:   Mastitis  is an infection of breast tissue that most often occurs in women who breastfeed  It can happen any time during breastfeeding, but usually occurs within the first 3 months after giving birth  Usually only one breast is affected  Common signs and symptoms include the following:   · Chills and fever    · Breast swelling, redness, warmth, and tenderness     · Tenderness under your arm    · Fatigue and body aches  Contact your healthcare provider if:   · Your symptoms do not get better within 2 days  · You have a painful lump in your breast      · You have swollen and tender lymph nodes in your armpit on the same side as the affected breast     · You have questions or concerns about your condition or care  Treatment:  You can continue to breastfeed your baby while you are being treated for mastitis  Breastfeeding when you have mastitis may help speed your recovery   You may need any of the following:  · Antibiotics  help treat or prevent a bacterial infection  · Acetaminophen  decreases pain and fever  It is available without a doctor's order  Ask how much to take and how often to take it  Follow directions  Acetaminophen can cause liver damage if not taken correctly  · NSAIDs , such as ibuprofen, help decrease swelling, pain, and fever  This medicine is available with or without a doctor's order  NSAIDs can cause stomach bleeding or kidney problems in certain people  If you take blood thinner medicine, always ask your healthcare provider if NSAIDs are safe for you  Always read the medicine label and follow directions  · Incision and drainage  may be needed if the swelling does not go away and an abscess forms  Your healthcare provider will make a small incision in your breast to drain the pus  Manage your symptoms:   · Continue to breastfeed from the affected breast   This will help to prevent an abscess from forming  Breastfeed your baby on the affected side first  Apply a warm, wet cloth on your breast or take a warm shower before you feed your baby  This can help increase your milk flow  If it is painful when you breastfeed from the affected breast, feed your baby from the other breast first  Pump the affected side to completely drain your breast after breastfeeding, if needed  You may save the pumped milk to feed your baby  · Use different positions to breastfeed  Change the position of your baby during feedings  This may help to relieve your discomfort  · Apply heat on your breast for 20 to 30 minutes every 2 hours for as many days as directed  Heat helps decrease pain  · Apply ice after feedings  Apply ice on your breast for 15 to 20 minutes every hour or as directed  Use an ice pack, or put crushed ice in a plastic bag  Cover it with a towel  Ice helps prevent tissue damage and decreases swelling and pain  · Massage your breast   Gently massage your breast before and during breastfeeding to help drain your milk  · Drink liquids as directed  Ask how much liquid to drink each day and which liquids are best for you  · Rest as needed  Do not sleep on your stomach until your infection is gone  Prevent mastitis:   · Breastfeed every 2 or 3 hours to prevent engorgement  Breast engorgement develops when too much milk builds up in your breast  Take your time when you breastfeed to allow your baby to empty your breast  Try not to switch breasts too early  Express or pump after you breastfeed if your baby is not emptying your breasts when he feeds  · Prevent sore and cracked nipples  A good latch prevents sore and cracked nipples  If you have sore nipples after breastfeeding, your baby may not be latched on properly  Gently break suction and reposition if your baby is only sucking on the nipple  Talk to a lactation consultant if you need help with your baby's latch  · Care for your breasts  Keep your nipples clean and dry between feedings  Check them for cracks, blisters, or other irritated areas  Ask a lactation specialist or your healthcare provider how to treat sore and cracked nipples  Wash your hands before and after you breastfeed your baby or pump your breasts  Wear a comfortable nursing bra that supports your breasts but is not too tight  Follow up with your healthcare provider as directed:  Write down your questions so you remember to ask them during your visits  © 2017 [address] Information is for End User's use only and may not be sold, redistributed or otherwise used for commercial purposes  All illustrations and images included in CareNotes® are the copyrighted property of [employer]  or Nilesh Donald  The above information is an  only  It is not intended as medical advice for individual conditions or treatments  Talk to your doctor, nurse or pharmacist before following any medical regimen to see if it is safe and effective for you      Breastfeeding and Nipple Soreness   AMBULATORY CARE:   Sore or tender nipples  may happen when you start to breastfeed  Nipple soreness is usually caused by incorrect latch and positioning  After you have found a position that works and your baby has a good latch, breastfeeding should be comfortable  You should not have pain, blisters, or bruises when you breastfeed  Your nipples should not crack, scab, or bleed  Engorgement may also cause nipple soreness  Regular breastfeeding or expressing breast milk can ease engorgement and help with nipple soreness  Seek care immediately if:   · One or both of your breasts is red, swollen or hard, painful, and feels warm or hot  · Your nipple has pus coming out of it  Contact your healthcare provider if:   · You have a fever  · Your nipple is red, dry, cracked, bleeding, or they have scabs on them  · You see or feel a tender lump in your breast      · You have questions or concerns about your condition or care  Manage nipple soreness:   · Help your baby get a good latch  Gently break suction and reposition if your baby is only sucking on the nipple  Talk to a lactation consultant if you need help with your baby's latch  Offer your baby the nipple that is less sore first  Your baby's suction is usually harder when he first starts breastfeeding  Breastfeed 8 to 12 times each day  Frequent breastfeeding can help decrease nipple soreness and prevent engorgement  · Change positions each time you breastfeed  This may help to keep pressure off of sore areas  · Apply a few drops of breast milk to your nipples  Breast milk is naturally soothing  Do not wear bras or clothes that are tight and put pressure on your nipples  · Wash your nipples with warm water only  Do not use soaps that contain alcohol or other chemicals  These can dry and irritate your nipples  Do not use nursing pads that are lined with plastic  Let your nipples air dry after you bathe or breastfeed  Wear loose, soft cotton shirts  · Ask your healthcare provider about the best cream or ointment for you  Change nursing pads often to keep moisture away from your nipples  Ask about medicines to help decrease pain and swelling  · Use a breast pump if it is too painful to breastfeed  until your nipples heal   Follow up with your healthcare provider as directed:  Write down your questions so you remember to ask them during your visits  For more information:   · [employer] [address], [place] [address]  Phone: 2- 025 - 034-1371  Web Address: http://[employer]/  · [facility]  [address]   [facility] Adelaida Hopkins  Phone: 9- [phone] - [phone]  Phone: 2- 954 - [phone]  Web Address: http://[facility]/  org  © 2017 [facility] Information is for End User's use only and may not be sold, redistributed or otherwise used for commercial purposes  All illustrations and images included in CareNotes® are the copyrighted property of [employer]  or Nilesh Donald  The above information is an  only  It is not intended as medical advice for individual conditions or treatments  Talk to your doctor, nurse or pharmacist before following any medical regimen to see if it is safe and effective for you  Follow up with PCP in 3-5 days  Proceed to  ER if symptoms worsen  Chief Complaint     Chief Complaint   Patient presents with    Breast Pain     b/l breast pain x 4 days is breast feeding states that b/l nipples are turning purple          History of Present Illness       Patient reports that she has developed of painful, warm and red lump on the medial aspect of her left breast today  She states she has tried taking warm shower and massaging it to release it, but this has not worked  Her baby is 3-2/2week-old and recently started sleeping 5 hours sometimes    Baby also tends to favor the right breast     Patient has occasional shooting pains in both breasts, but no other symptoms of yeast   Patient states that baby does not have a white coating on her tongue (baby is not present)  She describes her nipple sometimes turning white them purple, and sometimes being flat after baby nurses  She states that she has been having trouble getting baby to maintain a good latch and not pull off and relaxed more shallowly  She states she does have appointment tomorrow with the lactation consultant  She states the mom baby lactation consultant thought that the baby might have a tongue tie, but the hospital pediatrician and her private pediatrician did not think so  I encouraged her to make sure the lactation consultant checks this tomorrow since that is her full-time area of expertise  Review of Systems   Review of Systems   Constitutional: Negative for chills, fatigue and fever  Musculoskeletal: Negative for arthralgias and myalgias  Skin: Positive for color change (left breast)  All other systems reviewed and are negative          Current Medications       Current Outpatient Medications:     docusate sodium (COLACE) 100 mg capsule, Take 100 mg by mouth 2 (two) times a day, Disp: , Rfl:     sertraline (ZOLOFT) 100 mg tablet, Take 1 tablet (100 mg total) by mouth daily, Disp: 90 tablet, Rfl: 1    cephalexin (KEFLEX) 500 mg capsule, Take 1 capsule (500 mg total) by mouth 4 (four) times a day for 10 days, Disp: 40 capsule, Rfl: 0    Current Allergies     Allergies as of 02/02/2020 - Reviewed 02/02/2020   Allergen Reaction Noted    Hydrocodone-acetaminophen Other (See Comments) and GI Intolerance 10/09/2018            The following portions of the patient's history were reviewed and updated as appropriate: allergies, current medications, past family history, past medical history, past social history, past surgical history and problem list      Past Medical History:   Diagnosis Date    Anemia     anemia of pregnancy    Anxiety     Complication of anesthesia     extreme nausea    Diabetes mellitus (HCC)     A2GDM    Urinary tract infection     Varicella     had as child       Past Surgical History:   Procedure Laterality Date    APPENDECTOMY      DILATION AND EVACUATION      TONSILLECTOMY      WISDOM TOOTH EXTRACTION         Family History   Problem Relation Age of Onset    Depression Mother     Heart attack Father         x5    No Known Problems Sister     Heart defect Brother         mitral valve cleft - septal defect - had sugery as child    Cancer Maternal Grandmother     Lymphoma Maternal Grandmother     Lung cancer Maternal Grandmother     Stroke Maternal Grandmother     Diabetes Paternal Grandmother     Kidney failure Paternal Grandmother     Heart attack Paternal Grandmother     Heart attack Paternal Grandfather     No Known Problems Brother          Medications have been verified  Objective   /65   Pulse 74   Temp 97 6 °F (36 4 °C)   Resp 18   Ht 5' 5" (1 651 m)   Wt 68 kg (150 lb)   LMP 04/05/2019   SpO2 98%   Breastfeeding Yes   BMI 24 96 kg/m²        Physical Exam     Physical Exam   Constitutional: She is oriented to person, place, and time  She appears well-developed and well-nourished  No distress  HENT:   Head: Normocephalic and atraumatic  Eyes: Pupils are equal, round, and reactive to light  Neck: Normal range of motion  Neck supple  Pulmonary/Chest: Effort normal  No respiratory distress  She exhibits tenderness  Right breast exhibits tenderness  Right breast exhibits no nipple discharge and no skin change  Left breast exhibits tenderness  Left breast exhibits no nipple discharge and no skin change  No obvious defect to nipples, but they appear tender and slightly cracked--likely not an efficient latch yet  Encouraged mom that the 1st 1-2 months is often a learning curve and baby with breast-feeding    Encouraged her to see lactation consult tomorrow and see if any of the guidance is helpful  Encouraged her that the end of the day, her own happiness and satisfaction definitely matters too, and a fed baby is best, no matter what way  Abdominal: Soft  She exhibits no distension  Musculoskeletal: Normal range of motion  Neurological: She is alert and oriented to person, place, and time  Skin: Skin is warm and dry  Capillary refill takes less than 2 seconds  She is not diaphoretic  Psychiatric: She has a normal mood and affect  Her behavior is normal  Judgment and thought content normal    Nursing note and vitals reviewed

## 2020-02-04 NOTE — PROGRESS NOTES
I have reviewed the notes, assessments, and/or procedures performed by Whit Pemberton RN, IBCLC, I concur with her/his documentation of William Hernandez MD 02/04/20

## 2020-02-07 ENCOUNTER — OFFICE VISIT (OUTPATIENT)
Dept: PEDIATRICS CLINIC | Facility: CLINIC | Age: 30
End: 2020-02-07
Payer: COMMERCIAL

## 2020-02-07 VITALS
SYSTOLIC BLOOD PRESSURE: 126 MMHG | RESPIRATION RATE: 18 BRPM | TEMPERATURE: 98.4 F | DIASTOLIC BLOOD PRESSURE: 70 MMHG | HEART RATE: 68 BPM

## 2020-02-07 DIAGNOSIS — I73.9 VASOSPASM (HCC): Primary | ICD-10-CM

## 2020-02-07 DIAGNOSIS — O92.29 SORE NIPPLES DUE TO LACTATION: ICD-10-CM

## 2020-02-07 PROCEDURE — 1111F DSCHRG MED/CURRENT MED MERGE: CPT | Performed by: PEDIATRICS

## 2020-02-07 PROCEDURE — 1036F TOBACCO NON-USER: CPT | Performed by: PEDIATRICS

## 2020-02-07 PROCEDURE — 99214 OFFICE O/P EST MOD 30 MIN: CPT | Performed by: PEDIATRICS

## 2020-02-07 NOTE — PROGRESS NOTES
BREAST FEEDING FOLLOW UP VISIT    Informant/Relationship: Archana/mom    Discussion of General Lactation Issues: Sharman Gottron is still having pain with nursing and blanching after and between nursing  She was seen at [facility] and told the baby might have some restricted tongue movement  Sharman Gottron has had several blocked ducts  She is managing these with warmth and gentle massage  Sharman Gottron also pumps if it doesn't feel as if Nolan Books has emptied the breast well  James Anthony was seen by the pediatrician who told Sharman Gottron that Jinny's tongue tie was "posterior" and not the problem  Sharman Gottron wants to know if Nolan Books needs a frenotomy  Sharman Gottron has sharp, stabbing pain in the whole breast about every 2 hours  The pain feels pretty deep  Infant is 2 weeks old today  Interval Breastfeeding History:    Frequency of breast feeding: every 1-2 hours  Does mother feel breastfeeding is effective: Yes  Does infant appear satisfied after nursing:Yes  Stooling pattern normal:Yes  Urinating frequently:Yes  Using shield or shells:No    Alternative/Artificial Feedings:   Bottle: Yes, 3 times at most  Cup: N/A  Syringe/Finger: N/A           Formula Type: n/a                     Amount: n/a            Breast Milk:                      Amount: 2- 2 5 oz            Frequency total 3 x between feedings  Elimination Problems: No      Equipment:  Nipple Shield             Type: n/a             Size: n/a             Frequency of Use: n/a  Pump            Type: Spectra            Frequency of Use: a couple of times/week  Shells            Type: n/a            Frequency of use: n/a    Equipment Problems: no      Mom:  Breast: Normal  Nipple Assessment in General: Normal: elongated/eraser, no discoloration and no damage noted of the nipples, but the right nipple is blanched at the tip and slowly pinkens, right areola has small skin tag near the edge at 9'o'clock  Mother's Awareness of Feeding Cues                 Recognizes:  Yes                  Verbalizes: Yes  Support System: FOB  History of Breastfeeding: none  Changes/Stressors/Violence: Pain with nursing and between  Concerns/Goals: [facility] would like to nurse long term    Problems with Mom: Vasospasm, pain with nursing    Physical Exam   Constitutional: She appears well-developed and well-nourished  Neck: Normal range of motion  Neck supple  No thyromegaly present  Cardiovascular: Normal rate, regular rhythm and normal heart sounds  No murmur heard  Pulmonary/Chest: Effort normal and breath sounds normal    Lymphadenopathy:     She has no cervical adenopathy  She has no axillary adenopathy  Right axillary: No pectoral adenopathy present  Left axillary: No pectoral adenopathy present  Neurological: She is alert  Psychiatric: She has a normal mood and affect  Her behavior is normal  Judgment and thought content normal    Nursing note and vitals reviewed        Infant:  Behaviors: Alert  Color: Pink  Birth weight: 3 487 kg  Current weight: 5 228 kg    Problems with infant: Tongue tie      General Appearance:  Alert, active, no distress                            Head:  Normocephalic, AFOF, sutures opposed                            Eyes:   Conjunctiva clear, no drainage                            Ears:   Normally placed, no anomolies                           Nose:   Septum intact, no drainage or erythema; tongue with dimple to "heart shaped" tip with lift and extension; lift is limited to about 30% to the roof of her mouth; there is no lateralization; the frenulum is easy to see and easily palpable with a fingers sweep; tongue pulls back with each suck leading to biting with the lower alveolar ridge                          Mouth:  No lesions; tongue with visible median raphe and dimpling at tip causing a "heart shape" with extension; very limited lift and almost no lateralization; frenulum easily palpated with finger sweep; baby is easily gagged with gag reflex triggered by touch mid tongue                   Neck:  Supple, symmetrical, trachea midline, no adenopathy; thyroid: no enlargement, symmetric, no tenderness/mass/nodules                Respiratory:  No grunting, flaring, retractions, breath sounds clear and equal           Cardiovascular:  Regular rate and rhythm  No murmur  Adequate perfusion/capillary refill  Femoral pulse present                  Abdomen:    Soft, non-tender, no masses, bowel sounds present, no HSM            Genitourinary:  Normal female genitalia, anus patent                         Spine:   No abnormalities noted       Musculoskeletal:   Full range of motion         Skin/Hair/Nails:   Skin warm, dry, and intact, no rashes or abnormal dyspigmentation or lesions               Neurologic:   No abnormal movement, tone appropriate for gestational age    Wayne Latch:  Efficiency:               Lips Flanged: Yes, after frenotomy              Depth of latch: Good, following frenotomy              Audible Swallow: Yes, after frenotomy              Visible Milk: Yes, after frenotomy              Wide Open/ Asymmetrical: Yes, after frenotomy              Suck Swallow Cycle: Breathing: Unlabored, Coordinated: Yes  Nipple Assessment after latch: Normal: elongated/eraser, no discoloration and no damage noted  Latch Problems: Tongue tied; Latch is significantly improved with almost no discomfort and great milk transfer after frenotomy was performed  Position:  Infant's Ergonomics/Body               Body Alignment: Yes               Head Supported: Yes               Close to Mom's body/ Lifted/ Supported: Yes               Mom's Ergonomics/Body: Yes                           Supported: Yes                           Sitting Back: Yes                           Brings Baby to her breast: Yes  Positioning Problems: None        Education:  Reviewed Latch: Reviewed how to gently compress the breast as if offering a sandwich to facilitate a deeper latch     Reviewed Positioning for Dyad: Reviewed how to bring baby to the breast so that her lower lip and chin touch the breast with her nose just above the nipple to encourage a wider, more asymmetric latch  Reviewed Frequency/Supply & Demand: Recommended feeding on demand: when the baby gives hunger cues, when the breasts feel full, every 3 hours during the day and every 5 hours at night counting from the beginning of one feeding to the beginning of the next; whichever comes first    Reviewed Alternative/Artificial Feedings: Continue paced bottle feeding when not nursing  Reviewed Mom/Breast care: Keep nipples warm: wear layers; apply olive or coconut oil to nipples after nursing-first warm between fingers  Apply hand warmers or a heating pad on the lowest setting over nipple pads and the bra  Wear woolen or make fleece nipple pads; these may be purchased or made at home  Plan:  Discussed history and physical exams with Archana  Support given for her commitment to providing breast milk for her baby  Discussed the findings on the baby's exam consistent with tongue tie and reviewed how this may be the cause of nipple trauma, nipple pain, nipple damage, poor milk transfer, blocked ducts, mastitis, and loss of milk production  Discussed the science that supports performing the frenotomy to improve latch  Discussed vasospasm of the nipple  This is often secondary to previous on ongoing nipple trauma or damage  Initial care is to correct the latch and keep the nipples warm  Since the color changes, when the occur, and pain are secondary to spasm of the blood vessels, if correcting the latch and warming the nipples does not resolve the issue, vasodilating medications may be used  Reviewed the risks and benefits associated with these medications  Since the frenotomy was just done today, we will see if the vasospasm improves      I have spent 45 minutes with Patient  today in which greater than 50% of this time was spent in counseling/coordination of care regarding Prognosis, Risks and benefits of tx options, Intructions for management, Patient and family education and Impressions

## 2020-02-07 NOTE — PATIENT INSTRUCTIONS
Keep your nipples warm: wear layers; apply olive or coconut oil to your nipples after nursing-first warm between your fingers  You may also use hand warmers or a heating pad on the lowest setting over nipple pads and the bra  You can also make or purchase woolen or make fleece nipple pads  The woolen ones are marketed by [place] [employer]  These are available at [employer] or [address] [facility] [address]

## 2020-02-10 ENCOUNTER — OFFICE VISIT (OUTPATIENT)
Dept: POSTPARTUM | Facility: CLINIC | Age: 30
End: 2020-02-10
Payer: COMMERCIAL

## 2020-02-10 DIAGNOSIS — O92.79 PAIN AGGRAVATED BY BREAST FEEDING: ICD-10-CM

## 2020-02-10 DIAGNOSIS — R52 PAIN AGGRAVATED BY BREAST FEEDING: ICD-10-CM

## 2020-02-10 DIAGNOSIS — Z71.89 ENCOUNTER FOR BREAST FEEDING COUNSELING: Primary | ICD-10-CM

## 2020-02-10 PROCEDURE — 99404 PREV MED CNSL INDIV APPRX 60: CPT | Performed by: PEDIATRICS

## 2020-02-10 NOTE — PATIENT INSTRUCTIONS
Continue current plan  Consider PT evaluation for Jinny for additional support with breastfeeding difficulties  Follow up as desired  Call with concerns

## 2020-02-10 NOTE — PROGRESS NOTES
BREAST FEEDING FOLLOW UP VISIT    Informant/Relationship: Archana    Discussion of General Lactation Issues: Bladimir Joshua does not feel there has been any improvement since Jinny's frenotomy  Bladimir Joshua still has pain during and between feedings and [place] struggles to achieve and/or maintain a deep latch  Bladimir Joshua is also having difficulty effectively emptying her breasts when pumping  Infant is 10 weeks old today  Interval Breastfeeding History:    Frequency of breast feeding: Every hour or two on demand  Does mother feel breastfeeding is effective: No  Does infant appear satisfied after nursing:No  Stooling pattern normal:Yes  Urinating frequently:Yes  Using shield or shells:No    Alternative/Artificial Feedings:   Bottle: Yes, occasionally  [place] struggles with the bottle and gags frequently  Cup: No  Syringe/Finger: No           Formula Type: none                     Amount: n/a            Breast Milk:                      Amount: 1-2 ounces            Frequency Q 1-2 Hr between feedings  Elimination Problems: No      Equipment:  Nipple Shield             Type: none             Size: n/a             Frequency of Use: n/a  Pump            Type: Spectra S2            Frequency of Use: Once or twice a day  Shells            Type: none            Frequency of use: n/a    Equipment Problems: no    Mom:  Breast: Medium sized symmetrical breasts  Nipple Assessment in General: Everted nipples bilaterally  Sensitive to touch  Skin tag on the areola of the right breast at about 10 o'clock  Mother's Awareness of Feeding Cues                 Recognizes: Yes                  Verbalizes: Yes  Support System: FOB, extended family  History of Breastfeeding: none  Changes/Stressors/Violence: Bladimir Joshua is stressed by ongoing breastfeeding difficulty  Concerns/Goals: Bladimir Joshua wants to resolve her pain and continue breastfeeding      Problems with Mom: Vasospasm     Physical Exam   Constitutional: She is oriented to person, place, and time   She appears well-developed and well-nourished  HENT:   Head: Normocephalic and atraumatic  Neck: Normal range of motion  Neck supple  Cardiovascular: Normal rate, regular rhythm and normal heart sounds  Pulmonary/Chest: Effort normal and breath sounds normal    Musculoskeletal: Normal range of motion  She exhibits no edema  Neurological: She is alert and oriented to person, place, and time  Skin: Skin is warm and dry  Psychiatric: She has a normal mood and affect  Her behavior is normal  Judgment and thought content normal       Infant:  Behaviors: Alert  Color: Pink  Birth weight: 3487gram  Current weight: 4695gram    Problems with infant: Ongoing issues with latch and milk transfer S/P frenotomy  General Appearance:  Alert, active, no distress                            Head:  Normocephalic, AFOF, sutures opposed  Area of erythema on the scalp at the crown  Eyes:   Conjunctiva clear, no drainage                            Ears:   Normally placed, no anomolies                           Nose:   no drainage or erythema                          Mouth:  No lesions  Tongue extends well beyond her lower lip (she touched her chin with her tongue while interacting with Archana!) Tongue lateralizes well and tip elevates to mid mouth  Hyperactive gag reflex remains  Jagruti Perales would not suck on my finger  She just chewed on it  Healing frenotomy wound  Neck:  Prefers her head turned to her right side and some resistance when passively turning her head to the left side, symmetrical, trachea midline                Respiratory:  No grunting, flaring, retractions, breath sounds clear and equal           Cardiovascular:  Regular rate and rhythm  No murmur  Adequate perfusion/capillary refill                    Abdomen:    Soft, non-tender, no masses, bowel sounds present, no HSM            Genitourinary:  Normal female genitalia                         Spine:   No abnormalities noted       Musculoskeletal:   Full range of motion         Skin/Hair/Nails:   Skin warm, dry, and intact, no rashes or abnormal dyspigmentation or lesions               Neurologic:   No abnormal movement, tone appropriate     Latch:  Efficiency:               Lips Flanged: Yes              Depth of latch: wide briefly after several attempts but narrowed as the feeding progressed  Audible Swallow: Yes, a few  No sustained suckling bursts  Frequent quivering of the chin  Visible Milk: Yes              Wide Open/ Asymmetrical: Yes, initially              Suck Swallow Cycle: Breathing: unlabored, Coordinated: yes  Nipple Assessment after latch: Normal: elongated/eraser, no discoloration and no damage noted  Gamal Alexandra had no pain during or after the feeding  Latch Problems: Jinny needed several attempts to achieve a deep asymmetric latch  She continues to struggle to open wide for latch  Once a deep latch was achieved, she would pull back into a more narrow latch  Archana needed review of positioning but was then able to independently position for latch on the second and third breast   Jinny nursed briefly on each breast several times before she was content  She appeared to passively accept letdown but did not actively work for milk  Position:  Infant's Ergonomics/Body               Body Alignment: Yes, after repositioning  Initially her body was rotated away from the breast                Head Supported: Yes               Close to Mom's body/ Lifted/ Supported: Yes               Mom's Ergonomics/Body: Yes                           Supported: Yes                           Sitting Back: Yes                           Brings Baby to her breast: Yes  Positioning Problems: Gamal Alexandra positioned Jinny facing the breast but not touching the breast   Once James Tanner was brought closer with her chin on the breast, she opened her mouth wide    We then worked on Gamal Alexandra using her thumb to pop the nipple and breast tissue into Jinny's mouth as she moved her onto the breast       Handouts:   Hands on pumping    Education:  Reviewed Positioning for Dyad: Demonstrated how to gently compress the breast and align the baby so that his nose is just above the nipple with his lower lip and chin touching the breast to encourage the deepest, widest, off-center latch  Reviewed Equipment: Discussed and demonstrated the use and features of the Spectra S2 and the elements of hands on pumping  Discussed findings on today's oral exam (Lisa Mccallum)  Discussed noted improvements and remaining concerns  Discussed speech therapy evaluation (and potentially physical therapy as well) as an option to continue to support Gerry Lanza and Lisa Mccallum in their breastfeeding goals  Plan:  Continue current plan  Consider PT evaluation for Jinny for additional support with breastfeeding difficulties  Follow up as desired  Call with concerns  I have spent 60 minutes with Patient and family today in which greater than 50% of this time was spent in counseling/coordination of care regarding Patient and family education

## 2020-02-16 NOTE — PROGRESS NOTES
I have reviewed the notes, assessments, and/or procedures performed by Luz Waters RN, IBCLC, I concur with her/his documentation of Loren Hassan MD 02/16/20

## 2020-02-18 ENCOUNTER — POSTPARTUM VISIT (OUTPATIENT)
Dept: OBGYN CLINIC | Facility: MEDICAL CENTER | Age: 30
End: 2020-02-18

## 2020-02-18 VITALS — WEIGHT: 148 LBS | BODY MASS INDEX: 24.63 KG/M2 | DIASTOLIC BLOOD PRESSURE: 70 MMHG | SYSTOLIC BLOOD PRESSURE: 120 MMHG

## 2020-02-18 DIAGNOSIS — O24.414 INSULIN CONTROLLED GESTATIONAL DIABETES MELLITUS (GDM) IN THIRD TRIMESTER: ICD-10-CM

## 2020-02-18 PROBLEM — Z3A.39 39 WEEKS GESTATION OF PREGNANCY: Status: RESOLVED | Noted: 2019-12-03 | Resolved: 2020-02-18

## 2020-02-18 PROCEDURE — 99024 POSTOP FOLLOW-UP VISIT: CPT | Performed by: OBSTETRICS & GYNECOLOGY

## 2020-02-18 PROCEDURE — 1111F DSCHRG MED/CURRENT MED MERGE: CPT | Performed by: OBSTETRICS & GYNECOLOGY

## 2020-02-18 NOTE — PROGRESS NOTES
OB POSTPARTUM VISIT PROGRESS NOTE  Date of Encounter: 2020    Cheri Mendez    : 1990  (30 y o )  MR: [id number]    Simona Hines is in for her postpartum visit  She is now   She delivered by vacuum-assisted vaginal delivery  She delivered a Female on 2020  Infant's name is Fredi Pulido  Patient was [facility] by [facility]  She's generally doing well, denies current pain or bleeding issues, and has no significant depression issues  She is breast feeding exclusively  History of diabetes in pregnancy Yes   Complications in pregnancy: No   We discussed all appropriate contraceptive options and she chooses condoms  Objective     /70   Wt 67 1 kg (148 lb)   LMP  (LMP Unknown)   BMI 24 63 kg/m²     General:   appears stated age, cooperative, alert normal mood and affect   Neck: Neck: normal, supple,trachea midline, no masses   Heart: regular rate and rhythm, S1, S2 normal, no murmur, click, rub or gallop   Lungs: clear to auscultation bilaterally   Breasts: Deferred, breastfeeding   Abdomen: soft, non-tender, without masses or organomegaly   Vulva: normal female genitalia, Bartholin's, Urethra, [place] normal, no lesions, normal female hair distribution   Vagina: normal vagina, no discharge, exudate, lesion, or erythema   Urethra: normal   Cervix: Normal, no discharge  Uterus: uterus is not present   Adnexa: normal adnexa and no mass, fullness, tenderness     Assessment/Plan   Diagnoses and all orders for this visit:    Insulin controlled gestational diabetes mellitus (GDM) in third trimester  -     Glucose JENNI 2HR 75GM Nonpreg; Future      30 y o  y/o  now 6 weeks post VAVD doing well  1  2 hour glucola ordered  2  Last pap smear: 2019  3  Contraception: condoms  4  RTO in 1 year for annual visit     5   EPDS: 4    Chapin Singh MD

## 2020-03-06 ENCOUNTER — TELEPHONE (OUTPATIENT)
Dept: POSTPARTUM | Facility: CLINIC | Age: 30
End: 2020-03-06

## 2020-03-06 NOTE — TELEPHONE ENCOUNTER
Spoke with Nadege Kenney 2 mths still having feeding issues since the frenotomy - feeding is a struggle for Farida Peterson - she has to take breaks in feeding - breast feeds all the time when Kulwant Ramsey is not working - when she's struggling her chin quivers & she has to stop & then wait a little bit & feed again  When Kulwant Ramsey is at work (works part time) - Farida Peterson refuses the bottle for long periods of time & then will only drink when starving & at that point she gags  Asked Kulwant Ramsey does Jinny gag while breastfeeding - said that she does when Kulwant Ramsey tries to place the entire nipple in Jinny's mouth  Jinny pulls back on the nipple cause pain/damage  Kulwant Ramsey also mentioned she is constantly dealing with clogged ducts - uses warm compresses, massage, it seems to go away - but then within a couple days its back  She's pumping more now - when at work she pumps up to 5x's getting for the day anywhere between 16-20oz - when pumps while at home 2x's she gets 6-10oz a day  Freezer is stocked  She was holding off on Speech therapy & had talked with them on that & was told Farida Peterson would be on the wait list & to call when looking to schedule - she called them today & was told Farida Peterson was no where on the wait list - Kulwant Ramsey is waiting for a call back from someone in Speech therapy to schedule      We have appt set for 3/12

## 2020-03-06 NOTE — TELEPHONE ENCOUNTER
Rosie Trujillo still struggles to take a bottle and is primarily   Vernadine Ormond has ongoing issues with vasospasm and recurrent blocked ducts and admits she would have stopped breastfeeding by now except that she is concerned that Rosie Trujillo would not eat at all if she stopped  She is working on getting an appointment for speech therapy  She is using warmth to treat vasospasms and so far has been able to clear blocked ducts as they occur  We discussed lecithin to help with blocked ducts and I offered an appointment for follow up with Dr Ariana Moran which Vernadine Ormond declined at this time  She will follow up with me and with speech therapy as scheduled

## 2020-03-16 DIAGNOSIS — F41.9 ANXIETY: Primary | ICD-10-CM

## 2020-03-17 RX ORDER — LORAZEPAM 0.5 MG/1
0.5 TABLET ORAL EVERY 8 HOURS PRN
Qty: 30 TABLET | Refills: 0 | Status: SHIPPED | OUTPATIENT
Start: 2020-03-17 | End: 2020-07-29 | Stop reason: SDUPTHER

## 2020-07-29 DIAGNOSIS — F41.9 ANXIETY: ICD-10-CM

## 2020-07-30 RX ORDER — LORAZEPAM 0.5 MG/1
0.5 TABLET ORAL EVERY 8 HOURS PRN
Qty: 30 TABLET | Refills: 0 | Status: SHIPPED | OUTPATIENT
Start: 2020-07-30 | End: 2020-11-04 | Stop reason: ALTCHOICE

## 2020-08-31 ENCOUNTER — TELEPHONE (OUTPATIENT)
Dept: OBGYN CLINIC | Facility: MEDICAL CENTER | Age: 30
End: 2020-08-31

## 2020-08-31 NOTE — TELEPHONE ENCOUNTER
Pt wanted to verify that the lab was still active that she could complete advised it was active still

## 2020-09-03 DIAGNOSIS — R53.83 OTHER FATIGUE: Primary | ICD-10-CM

## 2020-09-03 DIAGNOSIS — R63.4 ABNORMAL WEIGHT LOSS: ICD-10-CM

## 2020-09-05 ENCOUNTER — APPOINTMENT (OUTPATIENT)
Dept: LAB | Facility: HOSPITAL | Age: 30
End: 2020-09-05
Payer: COMMERCIAL

## 2020-09-05 DIAGNOSIS — R53.83 OTHER FATIGUE: ICD-10-CM

## 2020-09-05 DIAGNOSIS — R63.4 ABNORMAL WEIGHT LOSS: ICD-10-CM

## 2020-09-05 LAB
25(OH)D3 SERPL-MCNC: 28.5 NG/ML (ref 30–100)
BASOPHILS # BLD AUTO: 0.1 THOUSANDS/ΜL (ref 0–0.1)
BASOPHILS NFR BLD AUTO: 1 % (ref 0–2)
EOSINOPHIL # BLD AUTO: 0.2 THOUSAND/ΜL (ref 0–0.61)
EOSINOPHIL NFR BLD AUTO: 3 % (ref 0–5)
ERYTHROCYTE [DISTWIDTH] IN BLOOD BY AUTOMATED COUNT: 12.9 % (ref 11.5–14.5)
FERRITIN SERPL-MCNC: 137 NG/ML (ref 8–388)
HCT VFR BLD AUTO: 39 % (ref 42–47)
HGB BLD-MCNC: 13.2 G/DL (ref 12–16)
IRON SATN MFR SERPL: 34 %
IRON SERPL-MCNC: 101 UG/DL (ref 50–170)
LYMPHOCYTES # BLD AUTO: 2.3 THOUSANDS/ΜL (ref 0.6–4.47)
LYMPHOCYTES NFR BLD AUTO: 35 % (ref 21–51)
MCH RBC QN AUTO: 32.1 PG (ref 26–34)
MCHC RBC AUTO-ENTMCNC: 33.8 G/DL (ref 31–37)
MCV RBC AUTO: 95 FL (ref 81–99)
MONOCYTES # BLD AUTO: 0.6 THOUSAND/ΜL (ref 0.17–1.22)
MONOCYTES NFR BLD AUTO: 9 % (ref 2–12)
NEUTROPHILS # BLD AUTO: 3.5 THOUSANDS/ΜL (ref 1.4–6.5)
NEUTS SEG NFR BLD AUTO: 52 % (ref 42–75)
PLATELET # BLD AUTO: 290 THOUSANDS/UL (ref 149–390)
PMV BLD AUTO: 8.5 FL (ref 8.6–11.7)
RBC # BLD AUTO: 4.1 MILLION/UL (ref 3.9–5.2)
T3FREE SERPL-MCNC: 2.62 PG/ML (ref 2.3–4.2)
TIBC SERPL-MCNC: 296 UG/DL (ref 250–450)
TSH SERPL DL<=0.05 MIU/L-ACNC: 2.95 UIU/ML (ref 0.45–5.33)
WBC # BLD AUTO: 6.7 THOUSAND/UL (ref 4.8–10.8)

## 2020-09-05 PROCEDURE — 84443 ASSAY THYROID STIM HORMONE: CPT

## 2020-09-05 PROCEDURE — 84481 FREE ASSAY (FT-3): CPT

## 2020-09-05 PROCEDURE — 83550 IRON BINDING TEST: CPT

## 2020-09-05 PROCEDURE — 82728 ASSAY OF FERRITIN: CPT

## 2020-09-05 PROCEDURE — 82306 VITAMIN D 25 HYDROXY: CPT

## 2020-09-05 PROCEDURE — 86376 MICROSOMAL ANTIBODY EACH: CPT

## 2020-09-05 PROCEDURE — 86618 LYME DISEASE ANTIBODY: CPT

## 2020-09-05 PROCEDURE — 86800 THYROGLOBULIN ANTIBODY: CPT

## 2020-09-05 PROCEDURE — 85025 COMPLETE CBC W/AUTO DIFF WBC: CPT

## 2020-09-05 PROCEDURE — 83540 ASSAY OF IRON: CPT

## 2020-09-05 PROCEDURE — 36415 COLL VENOUS BLD VENIPUNCTURE: CPT

## 2020-09-06 LAB — THYROPEROXIDASE AB SERPL-ACNC: 10 IU/ML (ref 0–34)

## 2020-09-08 ENCOUNTER — OFFICE VISIT (OUTPATIENT)
Dept: FAMILY MEDICINE CLINIC | Facility: CLINIC | Age: 30
End: 2020-09-08
Payer: COMMERCIAL

## 2020-09-08 VITALS
RESPIRATION RATE: 20 BRPM | DIASTOLIC BLOOD PRESSURE: 66 MMHG | TEMPERATURE: 97.4 F | OXYGEN SATURATION: 98 % | HEIGHT: 65 IN | WEIGHT: 126 LBS | HEART RATE: 74 BPM | SYSTOLIC BLOOD PRESSURE: 108 MMHG | BODY MASS INDEX: 20.99 KG/M2

## 2020-09-08 DIAGNOSIS — F41.9 ANXIETY: Primary | ICD-10-CM

## 2020-09-08 DIAGNOSIS — O24.414 INSULIN CONTROLLED GESTATIONAL DIABETES MELLITUS (GDM) IN THIRD TRIMESTER: ICD-10-CM

## 2020-09-08 PROBLEM — Z37.9 VACUUM-ASSISTED VAGINAL DELIVERY: Status: RESOLVED | Noted: 2020-01-06 | Resolved: 2020-09-08

## 2020-09-08 LAB — B BURGDOR IGG+IGM SER-ACNC: <0.91 ISR (ref 0–0.9)

## 2020-09-08 PROCEDURE — 99214 OFFICE O/P EST MOD 30 MIN: CPT | Performed by: NURSE PRACTITIONER

## 2020-09-08 RX ORDER — SERTRALINE HYDROCHLORIDE 25 MG/1
25 TABLET, FILM COATED ORAL DAILY
Qty: 90 TABLET | Refills: 3 | Status: SHIPPED | OUTPATIENT
Start: 2020-09-08 | End: 2020-10-27 | Stop reason: DRUGHIGH

## 2020-09-08 RX ORDER — TRAZODONE HYDROCHLORIDE 50 MG/1
25 TABLET ORAL
Qty: 45 TABLET | Refills: 0 | Status: SHIPPED | OUTPATIENT
Start: 2020-09-08 | End: 2020-10-27 | Stop reason: SDUPTHER

## 2020-09-08 NOTE — PATIENT INSTRUCTIONS
Take 2000 mcg of vitamin D  Take trazodone nightly       Increase zoloft after you see if the Trazodone is helping

## 2020-09-08 NOTE — PROGRESS NOTES
[facility]        NAME: Zoe Segal is a 26 y o  female  : 1990    MRN: [id number]  DATE: 2020  TIME: 12:36 PM    Assessment and Plan   Anxiety [F41 9]  1  Anxiety  sertraline (ZOLOFT) 25 mg tablet    traZODone (DESYREL) 50 mg tablet   2  Insulin controlled gestational diabetes mellitus (GDM) in third trimester  HEMOGLOBIN A1C W/ EAG ESTIMATION         Patient Instructions     Patient Instructions   Take 2000 mcg of vitamin D  Take trazodone nightly  Increase zoloft after you see if the Trazodone is helping            Chief Complaint     Chief Complaint   Patient presents with    Anxiety         History of Present Illness       Patient here for follow up visit for anxiety with c/o weight loss and fatigue  Patient does have a 10 month old baby at home  Concerend about fatigue and weight loss of 12 lbs from where she was before she had her baby  Patient having severe insomnia, exhausted, has tried melatonin  Is now taking her lorazepam every night and continues to breast feed  Patient reports that everything she was reading was that such low doses are in her breast milk that she feels it would be safe to continue  Would like her to discuss this with her gynecologist as I do not feel comfortable having her take this every day  Review of Systems   Review of Systems   Constitutional: Negative for activity change, appetite change, chills, fatigue and fever  HENT: Negative for congestion, ear pain, nosebleeds, rhinorrhea and sore throat  Eyes: Negative for photophobia, pain, redness and visual disturbance  Respiratory: Negative for cough, shortness of breath and wheezing  Cardiovascular: Negative  Negative for chest pain  Gastrointestinal: Negative  Negative for abdominal pain, constipation, diarrhea and vomiting  Endocrine: Negative  Genitourinary: Negative for difficulty urinating, dysuria and flank pain  Musculoskeletal: Negative      Skin: Negative for color change and rash  Neurological: Negative for dizziness, weakness, numbness and headaches  Hematological: Negative for adenopathy  Psychiatric/Behavioral: Negative for agitation and confusion  The patient is not nervous/anxious          PHQ-9 Depression Screening    PHQ-9:    Frequency of the following problems over the past two weeks:       Little interest or pleasure in doing things:  0 - not at all  Feeling down, depressed, or hopeless:  0 - not at all  PHQ-2 Score:  0        Current Medications       Current Outpatient Medications:     docusate sodium (COLACE) 100 mg capsule, Take 100 mg by mouth 2 (two) times a day, Disp: , Rfl:     LORazepam (ATIVAN) 0 5 mg tablet, Take 1 tablet (0 5 mg total) by mouth every 8 (eight) hours as needed for anxiety, Disp: 30 tablet, Rfl: 0    sertraline (ZOLOFT) 100 mg tablet, Take 1 tablet (100 mg total) by mouth daily, Disp: 90 tablet, Rfl: 1    sertraline (ZOLOFT) 25 mg tablet, Take 1 tablet (25 mg total) by mouth daily Take 125mg daily, Disp: 90 tablet, Rfl: 3    traZODone (DESYREL) 50 mg tablet, Take 0 5 tablets (25 mg total) by mouth daily at bedtime, Disp: 45 tablet, Rfl: 0    Current Allergies     Allergies as of 09/08/2020 - Reviewed 09/08/2020   Allergen Reaction Noted    Hydrocodone-acetaminophen Other (See Comments) and GI Intolerance 10/09/2018            The following portions of the patient's history were reviewed and updated as appropriate: allergies, current medications, past family history, past medical history, past social history, past surgical history and problem list      Past Medical History:   Diagnosis Date    Anemia     anemia of pregnancy    Anxiety     Complication of anesthesia     extreme nausea    Diabetes mellitus ([facility] [address] )     A2GDM    Shingles     Past    Vacuum-assisted vaginal delivery 1/6/2020    Varicella     had as child       Past Surgical History:   Procedure Laterality Date    APPENDECTOMY      DILATION AND EVACUATION      TONSILLECTOMY      WISDOM TOOTH EXTRACTION         Family History   Problem Relation Age of Onset    Depression Mother     Heart attack Father         x5    No Known Problems Sister     Heart defect Brother         mitral valve cleft - septal defect - had sugery as child    Cancer Maternal Grandmother         Lung cancer    Lymphoma Maternal Grandmother     Lung cancer Maternal Grandmother     Stroke Maternal Grandmother     Diabetes Paternal Grandmother     Kidney failure Paternal Grandmother     Heart attack Paternal Grandmother     Heart attack Paternal Grandfather     No Known Problems Brother          Medications have been verified  Objective   /66   Pulse 74   Temp (!) 97 4 °F (36 3 °C) (Tympanic)   Resp 20   Ht 5' 5" (1 651 m)   Wt 57 2 kg (126 lb)   SpO2 98%   BMI 20 97 kg/m²        Physical Exam     Physical Exam  Vitals signs and nursing note reviewed  Constitutional:       General: She is not in acute distress  Appearance: Normal appearance  She is well-developed  She is not ill-appearing  Eyes:      General: Lids are normal    Cardiovascular:      Rate and Rhythm: Normal rate and regular rhythm  Heart sounds: Normal heart sounds, S1 normal and S2 normal  No murmur  No friction rub  No gallop  Pulmonary:      Effort: Pulmonary effort is normal  No respiratory distress  Breath sounds: Normal breath sounds  No decreased breath sounds or wheezing  Musculoskeletal: Normal range of motion  General: No tenderness or deformity  Skin:     General: Skin is warm  Findings: No erythema or rash  Neurological:      Mental Status: She is alert and oriented to person, place, and time  Psychiatric:         Behavior: Behavior normal  Behavior is cooperative  Thought Content:  Thought content normal

## 2020-09-09 LAB — THYROGLOB AB SERPL-ACNC: <1 IU/ML (ref 0–0.9)

## 2020-09-14 ENCOUNTER — TELEPHONE (OUTPATIENT)
Dept: PSYCHIATRY | Facility: CLINIC | Age: 30
End: 2020-09-14

## 2020-09-14 NOTE — TELEPHONE ENCOUNTER
[facility]    Referred by:PCP    Please advised interviewee that they need to answer all questions truthfully to allow for best care and any misrepresentations of information may affect their ability to be seen at this clinic   => Was this discussed? Yes     [facility] -     Presenting Problem (in patient's words): Anxiety and depression  Are there any developmental disabilities? ? If yes, can they speak to you on the phone? If they are too limited to speak to you on phone, refer out No    Are you taking any psychiatric medications? Yes    => If yes, who prescribes?pcp zoloft, trazodone and lorazepan If yes, are they injectable medications? no    Does the patient have a language barrier or hearing impairment? No    Have you been treated at [facility] by a therapist or a doctor in the past? If yes, who? No    Has the patient been hospitalized for mental health? No   If yes, how long ago was last hospitalization and where was it? Do you actively use alcohol or marijuana or illegal substances? If yes, what and how much - refer out to Drug and alcohol treatment if use is excessive or daily use of illegal substances No concerns of substance abuse are reported  Do you have a community treatment team or ? No    Legal History-     Does the patient have any history of arrests, intermediate/FCI time, or DUIs? No  If Yes-  1) What types of charges? 2) When were they last incarcerated? 3) Are they currently on parole or probation? Minor Child-    Who has custody of the child? Is there a custody agreement? If there is a custody agreement remind parent that they must bring a copy to the first appt or they will not be seen       Intake Team, please check with provider before scheduling if flags come up such as:  - complex case  - legal history (other than DUI)  - communication barrier concerns are present  - if, in your judgment, this needs further review    ACCEPTED as a patient Yes  => Appointment Date: 10/27/20 with Edith Fried PA-C    Referred Elsewhere? No    Name of [address] [place] [address] administrators  Insurance ID#  Insurance Phone #  If ins is primary or secondary  If patient is a minor, parents information such as Name, D  O B of guarantor

## 2020-10-03 ENCOUNTER — APPOINTMENT (OUTPATIENT)
Dept: LAB | Facility: HOSPITAL | Age: 30
End: 2020-10-03
Payer: COMMERCIAL

## 2020-10-03 DIAGNOSIS — O24.414 INSULIN CONTROLLED GESTATIONAL DIABETES MELLITUS (GDM) IN THIRD TRIMESTER: ICD-10-CM

## 2020-10-03 LAB
EST. AVERAGE GLUCOSE BLD GHB EST-MCNC: 97 MG/DL
HBA1C MFR BLD: 5 %

## 2020-10-03 PROCEDURE — 83036 HEMOGLOBIN GLYCOSYLATED A1C: CPT

## 2020-10-03 PROCEDURE — 36415 COLL VENOUS BLD VENIPUNCTURE: CPT

## 2020-10-18 ENCOUNTER — PATIENT MESSAGE (OUTPATIENT)
Dept: FAMILY MEDICINE CLINIC | Facility: CLINIC | Age: 30
End: 2020-10-18

## 2020-10-18 DIAGNOSIS — Z34.93 THIRD TRIMESTER PREGNANCY: ICD-10-CM

## 2020-10-19 RX ORDER — SERTRALINE HYDROCHLORIDE 100 MG/1
100 TABLET, FILM COATED ORAL DAILY
Qty: 90 TABLET | Refills: 1 | Status: SHIPPED | OUTPATIENT
Start: 2020-10-19 | End: 2020-10-27 | Stop reason: SDUPTHER

## 2020-10-27 ENCOUNTER — TELEPHONE (OUTPATIENT)
Dept: PSYCHIATRY | Facility: CLINIC | Age: 30
End: 2020-10-27

## 2020-10-27 ENCOUNTER — OFFICE VISIT (OUTPATIENT)
Dept: PSYCHIATRY | Facility: CLINIC | Age: 30
End: 2020-10-27
Payer: COMMERCIAL

## 2020-10-27 DIAGNOSIS — F41.9 ANXIETY: Primary | ICD-10-CM

## 2020-10-27 DIAGNOSIS — F41.1 GAD (GENERALIZED ANXIETY DISORDER): Primary | ICD-10-CM

## 2020-10-27 DIAGNOSIS — Z34.93 THIRD TRIMESTER PREGNANCY: ICD-10-CM

## 2020-10-27 DIAGNOSIS — F41.9 ANXIETY: ICD-10-CM

## 2020-10-27 PROCEDURE — 90792 PSYCH DIAG EVAL W/MED SRVCS: CPT | Performed by: PHYSICIAN ASSISTANT

## 2020-10-27 RX ORDER — TRAZODONE HYDROCHLORIDE 50 MG/1
50 TABLET ORAL
Qty: 30 TABLET | Refills: 2 | Status: SHIPPED | OUTPATIENT
Start: 2020-10-27 | End: 2020-11-17 | Stop reason: SDUPTHER

## 2020-10-27 RX ORDER — SERTRALINE HYDROCHLORIDE 100 MG/1
150 TABLET, FILM COATED ORAL DAILY
Qty: 45 TABLET | Refills: 2 | Status: SHIPPED | OUTPATIENT
Start: 2020-10-27 | End: 2020-11-04 | Stop reason: SDUPTHER

## 2020-10-27 RX ORDER — BUSPIRONE HYDROCHLORIDE 5 MG/1
5 TABLET ORAL 3 TIMES DAILY
Qty: 90 TABLET | Refills: 0 | Status: SHIPPED | OUTPATIENT
Start: 2020-10-27 | End: 2020-11-17 | Stop reason: SDUPTHER

## 2020-11-02 DIAGNOSIS — F41.9 ANXIETY: ICD-10-CM

## 2020-11-02 RX ORDER — TRAZODONE HYDROCHLORIDE 50 MG/1
50 TABLET ORAL
Qty: 30 TABLET | Refills: 2 | OUTPATIENT
Start: 2020-11-02

## 2020-11-04 ENCOUNTER — TELEPHONE (OUTPATIENT)
Dept: PSYCHIATRY | Facility: CLINIC | Age: 30
End: 2020-11-04

## 2020-11-04 ENCOUNTER — DOCUMENTATION (OUTPATIENT)
Dept: PSYCHIATRY | Facility: CLINIC | Age: 30
End: 2020-11-04

## 2020-11-04 DIAGNOSIS — Z34.93 THIRD TRIMESTER PREGNANCY: ICD-10-CM

## 2020-11-04 RX ORDER — SERTRALINE HYDROCHLORIDE 100 MG/1
100 TABLET, FILM COATED ORAL DAILY
COMMUNITY
Start: 2020-11-04 | End: 2020-11-17 | Stop reason: SDUPTHER

## 2020-11-17 ENCOUNTER — OFFICE VISIT (OUTPATIENT)
Dept: PSYCHIATRY | Facility: CLINIC | Age: 30
End: 2020-11-17
Payer: COMMERCIAL

## 2020-11-17 DIAGNOSIS — F41.9 ANXIETY: ICD-10-CM

## 2020-11-17 DIAGNOSIS — Z34.93 THIRD TRIMESTER PREGNANCY: ICD-10-CM

## 2020-11-17 DIAGNOSIS — F32.A DEPRESSION, UNSPECIFIED DEPRESSION TYPE: Primary | ICD-10-CM

## 2020-11-17 DIAGNOSIS — F41.1 GAD (GENERALIZED ANXIETY DISORDER): ICD-10-CM

## 2020-11-17 PROCEDURE — 99214 OFFICE O/P EST MOD 30 MIN: CPT | Performed by: PHYSICIAN ASSISTANT

## 2020-11-17 RX ORDER — SERTRALINE HYDROCHLORIDE 100 MG/1
100 TABLET, FILM COATED ORAL DAILY
Qty: 90 TABLET | Refills: 0 | Status: SHIPPED | OUTPATIENT
Start: 2020-11-17 | End: 2021-04-02 | Stop reason: SDUPTHER

## 2020-11-17 RX ORDER — BUSPIRONE HYDROCHLORIDE 5 MG/1
5 TABLET ORAL 3 TIMES DAILY
Qty: 90 TABLET | Refills: 1 | Status: SHIPPED | OUTPATIENT
Start: 2020-11-17 | End: 2020-12-18 | Stop reason: SDUPTHER

## 2020-11-17 RX ORDER — TRAZODONE HYDROCHLORIDE 50 MG/1
50 TABLET ORAL
Qty: 90 TABLET | Refills: 0 | Status: SHIPPED | OUTPATIENT
Start: 2020-11-17 | End: 2021-04-02 | Stop reason: SDUPTHER

## 2020-11-30 ENCOUNTER — TELEPHONE (OUTPATIENT)
Dept: FAMILY MEDICINE CLINIC | Facility: CLINIC | Age: 30
End: 2020-11-30

## 2020-12-15 ENCOUNTER — TELEPHONE (OUTPATIENT)
Dept: PSYCHIATRY | Facility: CLINIC | Age: 30
End: 2020-12-15

## 2020-12-18 ENCOUNTER — TELEMEDICINE (OUTPATIENT)
Dept: PSYCHIATRY | Facility: CLINIC | Age: 30
End: 2020-12-18
Payer: COMMERCIAL

## 2020-12-18 DIAGNOSIS — F41.9 ANXIETY: ICD-10-CM

## 2020-12-18 DIAGNOSIS — F41.1 GAD (GENERALIZED ANXIETY DISORDER): Primary | ICD-10-CM

## 2020-12-18 DIAGNOSIS — F32.A DEPRESSION, UNSPECIFIED DEPRESSION TYPE: ICD-10-CM

## 2020-12-18 PROCEDURE — 99213 OFFICE O/P EST LOW 20 MIN: CPT | Performed by: PHYSICIAN ASSISTANT

## 2020-12-18 RX ORDER — BUSPIRONE HYDROCHLORIDE 5 MG/1
5 TABLET ORAL 3 TIMES DAILY
Qty: 90 TABLET | Refills: 1 | Status: SHIPPED | OUTPATIENT
Start: 2020-12-18 | End: 2021-04-02 | Stop reason: SDUPTHER

## 2021-01-07 ENCOUNTER — IMMUNIZATIONS (OUTPATIENT)
Dept: FAMILY MEDICINE CLINIC | Facility: HOSPITAL | Age: 31
End: 2021-01-07

## 2021-01-07 DIAGNOSIS — Z23 ENCOUNTER FOR IMMUNIZATION: Primary | ICD-10-CM

## 2021-01-07 PROCEDURE — 0011A SARS-COV-2 / COVID-19 MRNA VACCINE (MODERNA) 100 MCG: CPT

## 2021-01-07 PROCEDURE — 91301 SARS-COV-2 / COVID-19 MRNA VACCINE (MODERNA) 100 MCG: CPT

## 2021-01-22 ENCOUNTER — TELEMEDICINE (OUTPATIENT)
Dept: BEHAVIORAL/MENTAL HEALTH CLINIC | Facility: CLINIC | Age: 31
End: 2021-01-22
Payer: COMMERCIAL

## 2021-01-22 DIAGNOSIS — F41.1 GAD (GENERALIZED ANXIETY DISORDER): ICD-10-CM

## 2021-01-22 DIAGNOSIS — F32.A DEPRESSION, UNSPECIFIED DEPRESSION TYPE: Primary | ICD-10-CM

## 2021-01-22 PROCEDURE — 90791 PSYCH DIAGNOSTIC EVALUATION: CPT | Performed by: SOCIAL WORKER

## 2021-01-22 NOTE — BH TREATMENT PLAN
Cyndi Amaya  1990       Date of Initial Treatment Plan: 1/22/21   Date of Current Treatment Plan: 01/22/21    Treatment Plan Number 1    Strengths/Personal Resources for Self Care: loves her baby, loves her , employed    Diagnosis:   1  Depression, unspecified depression type     2  PEPE (generalized anxiety disorder)         Area of Needs: reduce anxiety, improve coping skills      Long Term Goal 1: "to be able to start managing my anxiety without relying on medications"    Target Date: 6/22/21  Completion Date: TBD         Short Term Objectives for Goal 1: see objectives below  1 1 Ricky Lynne will describe her worry and anxiety and how it impacts daily functioning  1 2  Ricky Lynne will process her emotions related to her relationship with her mother and how it impacts her anxiety  1 3  Ricky Lynne will identify 2 ways she can limit set and create boundaries in her relationships to improve her symptoms of anxiety  1 4  Ricky Lynne will remain compliant with prescribed psychiatric medication  1 5  Ricky Lynne will learn and engage in mindfulness/relaxation techniques daily to improve symptoms of anxiety  GOAL 1: Modality: Individual 2x per month   Completion Date TBD, Medication Management and The person(s) responsible for carrying out the plan is  Howard Banuelos Jerrald Avila   Modalities: CBT, insight oriented therapy, psychoeducation, mindfulness, stress management skills, assertiveness skills, DBT skills,guided imagery, emotional needs meeting  Behavioral Health Treatment Plan [employer] Pending sale to [facility]: Diagnosis and Treatment Plan explained to Leighton Almonte relates understanding diagnosis and is agreeable to Treatment Plan  Client Comments : Please share your thoughts, feelings, need and/or experiences regarding your treatment plan:     I, Cyndi Amaya agree to have Dickson Laura sign for me due to COVID-19
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
## 2021-01-22 NOTE — PSYCH
Virtual Regular Visit      Assessment/Plan:    Problem List Items Addressed This Visit        Other    Depression - Primary    PEPE (generalized anxiety disorder)               Reason for visit is VIRTUAL INITIAL PSYCHOTHERAPY ASSESSMENT DUE TO COVID-19     Encounter provider Delroy Phan    Provider located at [address] [place] [id number] [phone]    The patient was identified by name and date of birth  Katlin Meneses was informed that this is a telemedicine visit and that the visit is being conducted through [employer] and patient was informed that this is a secure, HIPAA-compliant platform  She agrees to proceed     My office door was closed  No one else was in the room  She acknowledged consent and understanding of privacy and security of the video platform  The patient has agreed to participate and understands they can discontinue the visit at any time  Patient is aware this is a billable service  Subjective  Katlin Meneses is a 26 y o  female   I spent 50 minutes directly with the patient during this visit      VIRTUAL VISIT DISCLAIMER    Katlin Gideon acknowledges that she has consented to an online visit or consultation  She understands that the online visit is based solely on information provided by her, and that, in the absence of a face-to-face physical evaluation by the physician, the diagnosis she receives is both limited and provisional in terms of accuracy and completeness  This is not intended to replace a full medical face-to-face evaluation by the physician  Katlin Meneses understands and accepts these terms

## 2021-02-03 ENCOUNTER — IMMUNIZATIONS (OUTPATIENT)
Dept: FAMILY MEDICINE CLINIC | Facility: HOSPITAL | Age: 31
End: 2021-02-03

## 2021-02-03 DIAGNOSIS — Z23 ENCOUNTER FOR IMMUNIZATION: Primary | ICD-10-CM

## 2021-02-03 PROCEDURE — 0012A SARS-COV-2 / COVID-19 MRNA VACCINE (MODERNA) 100 MCG: CPT

## 2021-02-03 PROCEDURE — 91301 SARS-COV-2 / COVID-19 MRNA VACCINE (MODERNA) 100 MCG: CPT

## 2021-02-10 ENCOUNTER — DOCUMENTATION (OUTPATIENT)
Dept: BEHAVIORAL/MENTAL HEALTH CLINIC | Facility: CLINIC | Age: 31
End: 2021-02-10

## 2021-02-10 ENCOUNTER — TELEPHONE (OUTPATIENT)
Dept: BEHAVIORAL/MENTAL HEALTH CLINIC | Facility: CLINIC | Age: 31
End: 2021-02-10

## 2021-02-10 NOTE — TELEPHONE ENCOUNTER
Contacted Yu Damon and left message informing her of a cancellation in this provider's schedule tomorrow, 2/10/21 at 0900  I requested a return phone call if she'd like to have this appt

## 2021-02-17 ENCOUNTER — TELEMEDICINE (OUTPATIENT)
Dept: BEHAVIORAL/MENTAL HEALTH CLINIC | Facility: CLINIC | Age: 31
End: 2021-02-17
Payer: COMMERCIAL

## 2021-02-17 DIAGNOSIS — F32.A DEPRESSION, UNSPECIFIED DEPRESSION TYPE: Primary | ICD-10-CM

## 2021-02-17 DIAGNOSIS — F41.1 GAD (GENERALIZED ANXIETY DISORDER): ICD-10-CM

## 2021-02-17 PROCEDURE — 90834 PSYTX W PT 45 MINUTES: CPT | Performed by: SOCIAL WORKER

## 2021-02-17 NOTE — PSYCH
This note was not shared with the patient due to this is a psychotherapy note     Virtual Regular Visit      Assessment/Plan:    Problem List Items Addressed This Visit        Other    Depression - Primary    PEPE (generalized anxiety disorder)               Reason for visit is VIRTUAL PSYCHOTHERAPY SESSION DUE TO COVID-19     Encounter provider [facility]    Provider located at [address] [place] [id number] [phone]    The patient was identified by name and date of birth  Melissa Peralta was informed that this is a telemedicine visit and that the visit is being conducted through [employer] and patient was informed that this is a secure, HIPAA-compliant platform  She agrees to proceed     My office door was closed  No one else was in the room  She acknowledged consent and understanding of privacy and security of the video platform  The patient has agreed to participate and understands they can discontinue the visit at any time  Patient is aware this is a billable service  Subjective  Melissa Peralta is a 26 y o  female   Psychotherapy Provided: Individual Psychotherapy 50 minutes     Length of time in session: 50 minutes, follow up in 1 week    Goals addressed in session: Goal 1     Pain:      none    0    Current suicide risk : Low     Met with Archana  She reports doing overall well  She reports no increase in her anxiety since her initial session, no panic attacks  She was told that she does not have a follow up medication management appt scheduled  She was encouraged to call the office  Session focused on identifying and addressing triggers to her anxiety and exploring effective wellness tools with the use of DBT, problem solving, and psychoeducation  Adriana Akil identifies being mostly a full time mom and her relationship with her mother as stressful    Archana processed her emotions about being mostly a full time mom with a lack of time for herself  She shared how she is stuck at the house for days at a time due to only having one vehicle and caring for the baby  We discussed creating a healthier balance of parenting tasks and speaking to her  about additional support when he is home  We discussed the importance of self care  During nap time, Vernadine Ormond spends time reading, doing laundry, making a meal for herself  We discussed the addition of exercise, meditation, or yoga some days  At night, Vernadine Ormond and her  watch Netflix together  We discussed continued date nights  Vernadine Ormond mentioned this to her  since our last session and he agreed but did not move forward with planning  We discussed Vernadine Ormond taking on that role of planning a date night for them  Vernadine Ormond also processed her emotions about her mother's behaviors and her unwillingness to seek mental health treatment  Vernadine Ormond expresses frustration with her mother's shopping habits, absence of showing Vernadine Ormond she loves her through telling her/hugging her, and constant excuses  This has created strain between her and her ; however, they allow Archana's mother to come 1x a week to care for their daughter--"she is so good with her "  Vernadine Ormond was introduced to [facility] skill to express her wants/needs effectively  We discussed the barrier of only being in control of oneself and willing to make changes  Vernadine Ormond will implement wellness tools learned in session  Share successes and challenges at next session  Continue weekly support       Mental status:  Appearance calm and cooperative , adequate hygiene and grooming and good eye contact    Mood mood appropriate   Affect affect appropriate    Speech speech soft   Thought Processes coherent/organized and normal thought processes   Hallucinations no hallucinations present    Thought Content no delusions   Abnormal Thoughts no suicidal thoughts  and no homicidal thoughts    Orientation oriented to person and place and time   Remote Memory short term memory intact and long term memory intact   Attention Span concentration intact   Intellect Appears to be of Average Intelligence   Fund of Knowledge displays adequate knowledge of current events   Insight Insight intact   Judgement judgment was intact   Muscle Strength Unable to assess due to virtual visit   Language no difficulty naming common objects   Pain none   Pain Scale 0       [address]: Diagnosis and Treatment Plan explained to Dottie Carney relates understanding diagnosis and is agreeable to Treatment Plan  Yes         I spent 50 minutes directly with the patient during this visit      VIRTUAL VISIT DISCLAIMER    Yu Damon acknowledges that she has consented to an online visit or consultation  She understands that the online visit is based solely on information provided by her, and that, in the absence of a face-to-face physical evaluation by the physician, the diagnosis she receives is both limited and provisional in terms of accuracy and completeness  This is not intended to replace a full medical face-to-face evaluation by the physician  Yu Damon understands and accepts these terms

## 2021-02-24 ENCOUNTER — TELEMEDICINE (OUTPATIENT)
Dept: BEHAVIORAL/MENTAL HEALTH CLINIC | Facility: CLINIC | Age: 31
End: 2021-02-24
Payer: COMMERCIAL

## 2021-02-24 DIAGNOSIS — F41.1 GAD (GENERALIZED ANXIETY DISORDER): ICD-10-CM

## 2021-02-24 DIAGNOSIS — F32.A DEPRESSION, UNSPECIFIED DEPRESSION TYPE: Primary | ICD-10-CM

## 2021-02-24 PROCEDURE — 90834 PSYTX W PT 45 MINUTES: CPT | Performed by: SOCIAL WORKER

## 2021-02-24 NOTE — PSYCH
This note was not shared with the patient due to this is a psychotherapy note     Virtual Regular Visit      Assessment/Plan:    Problem List Items Addressed This Visit        Other    Depression - Primary    PEPE (generalized anxiety disorder)               Reason for visit is VIRTUAL PSYCHOTHERAPY SESSION DUE TO COVID-19     Encounter provider [facility]    Provider located at [address] [place] [id number] [phone]    The patient was identified by name and date of birth  Sueellen Mcardle was informed that this is a telemedicine visit and that the visit is being conducted through [employer] and patient was informed that this is a secure, HIPAA-compliant platform  She agrees to proceed     My office door was closed  No one else was in the room  She acknowledged consent and understanding of privacy and security of the video platform  The patient has agreed to participate and understands they can discontinue the visit at any time  Patient is aware this is a billable service  Subjective  Sueellen Mcardle is a 26 y o  female   Psychotherapy Provided: Individual Psychotherapy 45 minutes     Length of time in session: 45 minutes, follow up in 1 week    Goals addressed in session: Goal 1     Pain:      none    0    Current suicide risk : Low     Met with Archana  She describes herself feeling, "bored and annoyed" because she has not left the house since Sunday  She initially expressed frustration and annoyance when session began due to difficulty signing on to [address]  We discussed how to make this process smoother for her as she feels it increased her anxiety  She did not fully complete her homework from last session stating, "I looked into it "      Session focused on meeting Archana's emotional needs    Archana processed her emotions about the emotional toll she feels when she does not leave the house and/or take a break from the baby  She has been practicing being more assertive with her  to express her needs  She went to the grocery store by herself on Sunday, shoveled the driveway the other day while her  was inside with the baby, and stayed in her room doing macrame for self care  We discussed increasing social interaction in the area as Archana's family and friends are an hour+ away  Bashir Rm was resistive towards this stating "I don't want to exert my energy in new friendships "   We discussed her present relationships  Archana's mother continues to remain a stressor  We discussed her mother's behaviors for Bashir Rm to gain a better understanding of her mothers emotional and physical needs  Archana processed her emotions about her mother's unwillingness to make changes in her life to improve her relationships; specifically feelings of anger  Bashir Rm did not use the [facility] skill with her mother but feels it would not work  We discussed why Bashir Rm feels this way  Bashir Rm struggles with wanting to be in control of her mother's behaviors to improve her life, knowing she is unable to control what her mother says/does, and practicing radical acceptance of the situation  Bashir Rm states, "I just want to communicate with her life a normal person "      Bashir Rm will implement wellness tools learned in session  Continue weekly support         Mental status:  Appearance calm and cooperative , adequate hygiene and grooming and good eye contact    Mood depressed and irritable   Affect congruent   Speech a normal rate and normal volume   Thought Processes coherent/organized and normal thought processes   Hallucinations no hallucinations present    Thought Content no delusions   Abnormal Thoughts no suicidal thoughts  and no homicidal thoughts    Orientation  oriented to person and place and time   Remote Memory short term memory intact and long term memory intact   Attention Span concentration intact   Intellect Appears to be of Average Intelligence   Fund of Knowledge displays adequate knowledge of current events   Insight fair   Judgement fair   Muscle Strength Unable to assess due to virtual session   Language no difficulty naming common objects   Pain none   Pain Scale 0     Behavioral Health Treatment Plan [facility]: Diagnosis and Treatment Plan explained to Amy Gamez relates understanding diagnosis and is agreeable to Treatment Plan  Yes     I spent 50 minutes directly with the patient during this visit      VIRTUAL VISIT DISCLAIMER    Verner Piedmont acknowledges that she has consented to an online visit or consultation  She understands that the online visit is based solely on information provided by her, and that, in the absence of a face-to-face physical evaluation by the physician, the diagnosis she receives is both limited and provisional in terms of accuracy and completeness  This is not intended to replace a full medical face-to-face evaluation by the physician  Verner Piedmont understands and accepts these terms

## 2021-03-10 ENCOUNTER — TELEMEDICINE (OUTPATIENT)
Dept: BEHAVIORAL/MENTAL HEALTH CLINIC | Facility: CLINIC | Age: 31
End: 2021-03-10
Payer: COMMERCIAL

## 2021-03-10 DIAGNOSIS — F41.1 GAD (GENERALIZED ANXIETY DISORDER): ICD-10-CM

## 2021-03-10 DIAGNOSIS — F32.A DEPRESSION, UNSPECIFIED DEPRESSION TYPE: Primary | ICD-10-CM

## 2021-03-10 PROCEDURE — 90834 PSYTX W PT 45 MINUTES: CPT | Performed by: SOCIAL WORKER

## 2021-03-10 NOTE — PSYCH
This note was not shared with the patient due to this is a psychotherapy note     Virtual Regular Visit      Assessment/Plan:    Problem List Items Addressed This Visit        Other    Depression - Primary    PEPE (generalized anxiety disorder)               Reason for visit is VIRTUAL PSYCHOTHERAPY SESSION DUE TO COVID-19     Encounter provider [facility]    Provider located at [address] [place] [id number] [phone]    The patient was identified by name and date of birth  Abby Escobar was informed that this is a telemedicine visit and that the visit is being conducted through [employer] and patient was informed that this is a secure, HIPAA-compliant platform  She agrees to proceed     My office door was closed  No one else was in the room  She acknowledged consent and understanding of privacy and security of the video platform  The patient has agreed to participate and understands they can discontinue the visit at any time  Patient is aware this is a billable service  Subjective  Abby Escobar is a 26 y o  female   Psychotherapy Provided: Individual Psychotherapy 45 minutes     Length of time in session: 45 minutes, follow up in 2 week    Goals addressed in session: Goal 1     Pain:      none    0    Current suicide risk : Low     Met with Archana Joshua reports doing overall well  She completed her homework from the previous session and used interpersonal skills, specifically [facility] with her mother  Session focused on meeting Archana's emotional needs using CBT and DBT  Archana processed her conversation with her mother and how she used the DBT skill, [facility] to express her wants and needs effectively  Bladimir Joshua shared how she the conversation went better than she thought it would had and overall, it made her feel better    She gained a better understanding of how her mother feels and about her mother's past  Ilan Casey states, "I learned she is viewing things differently "  We discussed positive changes she can begin to make after the conversation to improve their relationship  She also spoke to her  for additional support  Ilan Casey continues to struggle with being emotionally attached to her mother's negative behaviors  During their conversation, her mother told her the only reason she doesn't harm herself is because of Archana's daughter  According to Ilan Casey, her mother has threatened to starve herself and stop her medications in the past    Ilan Casey processed how this makes her feel; especially since her 's father committed suicide  We discussed mental health resources and the crisis # was provided  We discussed I statements, continuing to use [facility], and setting clear boundaries  Ilan Casey wishes to continue focusing on effective interpersonal skills to use with her mother and anxiety reduction techniques in sessions  Ilan Casey will continue to implement wellness tools learned in session with focus on effective interpersonal skills       Mental status:  Appearance calm and cooperative , adequate hygiene and grooming and good eye contact    Mood depressed   Affect constricted   Speech a normal rate and normal volume   Thought Processes coherent/organized and normal thought processes   Hallucinations no hallucinations present    Thought Content no delusions   Abnormal Thoughts no suicidal thoughts  and no homicidal thoughts    Orientation  oriented to person and place and time   Remote Memory short term memory intact and long term memory intact   Attention Span concentration intact   Intellect Appears to be of Average Intelligence   Fund of Knowledge displays adequate knowledge of current events   Insight fair   Judgement fair   Muscle Strength fair   Language no difficulty naming common objects   Pain none   Pain Scale 0     [address]: Diagnosis and Treatment Plan explained to Mayi Velasquez relates understanding diagnosis and is agreeable to Treatment Plan  Yes       I spent 45 minutes directly with the patient during this visit      VIRTUAL VISIT DISCLAIMER    Indy Rashid acknowledges that she has consented to an online visit or consultation  She understands that the online visit is based solely on information provided by her, and that, in the absence of a face-to-face physical evaluation by the physician, the diagnosis she receives is both limited and provisional in terms of accuracy and completeness  This is not intended to replace a full medical face-to-face evaluation by the physician  Indy Rashid understands and accepts these terms

## 2021-03-23 ENCOUNTER — TELEMEDICINE (OUTPATIENT)
Dept: BEHAVIORAL/MENTAL HEALTH CLINIC | Facility: CLINIC | Age: 31
End: 2021-03-23
Payer: COMMERCIAL

## 2021-03-23 DIAGNOSIS — F32.1 CURRENT MODERATE EPISODE OF MAJOR DEPRESSIVE DISORDER, UNSPECIFIED WHETHER RECURRENT (HCC): Primary | ICD-10-CM

## 2021-03-23 DIAGNOSIS — F41.1 GAD (GENERALIZED ANXIETY DISORDER): ICD-10-CM

## 2021-03-23 PROCEDURE — 90834 PSYTX W PT 45 MINUTES: CPT | Performed by: SOCIAL WORKER

## 2021-03-30 ENCOUNTER — ANNUAL EXAM (OUTPATIENT)
Dept: OBGYN CLINIC | Facility: MEDICAL CENTER | Age: 31
End: 2021-03-30
Payer: COMMERCIAL

## 2021-03-30 VITALS — SYSTOLIC BLOOD PRESSURE: 120 MMHG | DIASTOLIC BLOOD PRESSURE: 60 MMHG | BODY MASS INDEX: 20.63 KG/M2 | WEIGHT: 124 LBS

## 2021-03-30 DIAGNOSIS — G56.00 CARPAL TUNNEL SYNDROME, UNSPECIFIED LATERALITY: ICD-10-CM

## 2021-03-30 DIAGNOSIS — M62.89 PELVIC FLOOR DYSFUNCTION IN FEMALE: ICD-10-CM

## 2021-03-30 DIAGNOSIS — Z01.419 ENCOUNTER FOR WELL WOMAN EXAM WITH ROUTINE GYNECOLOGICAL EXAM: Primary | ICD-10-CM

## 2021-03-30 PROCEDURE — 99395 PREV VISIT EST AGE 18-39: CPT | Performed by: OBSTETRICS & GYNECOLOGY

## 2021-03-30 NOTE — PROGRESS NOTES
[facility] [address] [place] [address]    ASSESSMENT/PLAN: Sunny Pretty is a 26 y o   who presents for annual gynecologic exam     Encounter for routine gynecologic examination  - Routine well woman exam completed today  - Cervical Cancer Screening: Current ASCCP Guidelines reviewed  Last Pap: 2019  Next Pap Due:   - HPV Vaccination status: Immunization series complete  - Contraceptive counseling discussed  Current contraception: condoms:     Additional problems addressed during this visit:  1  Encounter for well woman exam with routine gynecological exam    2  Pelvic floor dysfunction in female  -     Ambulatory referral to Physical Therapy; Future    3  Carpal tunnel syndrome, unspecified laterality  -     Ambulatory referral to Physical Therapy; Future        CC:  Annual Gynecologic Examination    HPI: Sunny Pretty is a 26 y o  Devi Balint who presents for annual gynecologic examination  HPI  She reports  no new changes to her health  She reports no breast concerns  Still breast feeding  Patient reports dyspareunia, pain from carpal tunnel, and chronic constipation  Will refer to PT and GI    The following portions of the patient's history were reviewed and updated as appropriate: allergies, current medications, past family history, past medical history, obstetric history, gynecologic history, past social history, past surgical history and problem list     Review of Systems   Constitutional: Negative  HENT: Negative  Eyes: Negative  Respiratory: Negative  Cardiovascular: Negative  Gastrointestinal: Negative  Genitourinary: Negative  Musculoskeletal: Negative  All other systems reviewed and are negative  Objective:  /60   Wt 56 2 kg (124 lb)   BMI 20 63 kg/m²    Physical Exam  Vitals signs reviewed  Constitutional:       General: She is not in acute distress  Appearance: She is well-developed     HENT:      Head: Normocephalic and atraumatic  Nose: Nose normal    Neck:      Musculoskeletal: Normal range of motion  Cardiovascular:      Rate and Rhythm: Normal rate  Pulmonary:      Effort: Pulmonary effort is normal  No respiratory distress  Chest:      Breasts: Breasts are symmetrical          Right: Normal  No mass, nipple discharge, skin change or tenderness  Left: Normal  No mass, nipple discharge, skin change or tenderness  Abdominal:      General: There is no distension  Palpations: Abdomen is soft  There is no mass  Tenderness: There is no abdominal tenderness  There is no guarding or rebound  Genitourinary:     General: Normal vulva  Exam position: Lithotomy position  Labia:         Right: No lesion  Left: No lesion  Urethra: No prolapse (urethral meatus normal)  Vagina: Normal  No vaginal discharge, erythema or bleeding  Cervix: Normal       Uterus: Normal        Adnexa: Right adnexa normal and left adnexa normal    Musculoskeletal: Normal range of motion  Lymphadenopathy:      Upper Body:      Right upper body: No supraclavicular, axillary or pectoral adenopathy  Left upper body: No supraclavicular, axillary or pectoral adenopathy  Lower Body: No right inguinal adenopathy  No left inguinal adenopathy  Skin:     General: Skin is warm and dry  Neurological:      Mental Status: She is alert and oriented to person, place, and time  Psychiatric:         Behavior: Behavior normal          Thought Content:  Thought content normal          Judgment: Judgment normal

## 2021-04-01 ENCOUNTER — TELEMEDICINE (OUTPATIENT)
Dept: PSYCHIATRY | Facility: CLINIC | Age: 31
End: 2021-04-01
Payer: COMMERCIAL

## 2021-04-01 DIAGNOSIS — F41.1 GAD (GENERALIZED ANXIETY DISORDER): ICD-10-CM

## 2021-04-01 DIAGNOSIS — F41.9 ANXIETY: ICD-10-CM

## 2021-04-01 DIAGNOSIS — Z34.93 THIRD TRIMESTER PREGNANCY: ICD-10-CM

## 2021-04-01 DIAGNOSIS — F32.1 CURRENT MODERATE EPISODE OF MAJOR DEPRESSIVE DISORDER, UNSPECIFIED WHETHER RECURRENT (HCC): Primary | ICD-10-CM

## 2021-04-01 PROCEDURE — 99212 OFFICE O/P EST SF 10 MIN: CPT | Performed by: PHYSICIAN ASSISTANT

## 2021-04-02 RX ORDER — BUSPIRONE HYDROCHLORIDE 5 MG/1
5 TABLET ORAL 3 TIMES DAILY
Qty: 90 TABLET | Refills: 2 | Status: SHIPPED | OUTPATIENT
Start: 2021-04-02 | End: 2021-06-09 | Stop reason: SDUPTHER

## 2021-04-02 RX ORDER — SERTRALINE HYDROCHLORIDE 100 MG/1
100 TABLET, FILM COATED ORAL DAILY
Qty: 90 TABLET | Refills: 0 | Status: SHIPPED | OUTPATIENT
Start: 2021-04-02 | End: 2021-06-09 | Stop reason: SDUPTHER

## 2021-04-02 RX ORDER — TRAZODONE HYDROCHLORIDE 50 MG/1
25 TABLET ORAL
Qty: 45 TABLET | Refills: 0 | Status: SHIPPED | OUTPATIENT
Start: 2021-04-02 | End: 2021-06-09 | Stop reason: SDUPTHER

## 2021-04-02 NOTE — PSYCH
Virtual Regular Visit         Encounter provider Fernando Ku PA-C    Provider located at [address] [id number] [phone]      Recent Visits  No visits were found meeting these conditions  Showing recent visits within past 7 days and meeting all other requirements     Future Appointments  No visits were found meeting these conditions  Showing future appointments within next 150 days and meeting all other requirements        The patient was identified by name and date of birth  Romario Warren was informed that this is a telemedicine visit and that the visit is being conducted through [facility] and patient was informed that this is a secure, HIPAA-compliant platform  She agrees to proceed     My office door was closed  No one else was in the room  She acknowledged consent and understanding of privacy and security of the video platform  The patient has agreed to participate and understands they can discontinue the visit at any time  Patient is aware this is a billable service  VIRTUAL VISIT DISCLAIMER    Romario Warren acknowledges that she has consented to an online visit or consultation  She understands that the online visit is based solely on information provided by her, and that, in the absence of a face-to-face physical evaluation by the physician, the diagnosis she receives is both limited and provisional in terms of accuracy and completeness  This is not intended to replace a full medical face-to-face evaluation by the physician  Romario Warren understands and accepts these terms        MEDICATION MANAGEMENT NOTE        [address]  [facility] [place]  [id number] [facility] [id number] [phone]        Name and Date of Birth:  Romario Warren 26 y o  1990    Date of Visit:April 1, 2021  SUBJECTIVE:      Francia Spring seen by Miranda Tran 12/18 at which time meds unchanged  Francia Spring has been seeing Kalpesh Gold regularly for therapy and finding this very helpful  Overall mood and anxiety have been well-controlled  Does admit that when she is experiencing a significant stressor (e g  conflict with supervisor at work) she will feel anxious all day accompanied by GI upset, difficulty letting go of the situation  Anxiety and anger can escalate to crying at times  3-4 mths ago Francia Spring decreased trazodone to 25 mg for tolerance and is sleeping well on this dose  We talked about possibly increasing buspar but Francia Spring states she prefers to work on getting the mid-day dose in more regularly before increasing  Review of Systems   Constitutional: Negative for activity change  Psychiatric/Behavioral: Negative for sleep disturbance           Psychiatric, trauma, social hx reviewed        OBJECTIVE:     MENTAL STATUS EXAM  Appearance:  age appropriate   Behavior:  Pleasant & cooperative   Speech:  Normal volume, regular rate and rhythm   Mood:  mildy tense   Affect:  constricted   Language: intact and appropriate for age, education, and intellect   Thought Process:  goal directed   Associations: intact associations   Thought Content:  no overt delusions   Perceptual Disturbances: no auditory or visual hallcunations   Risk Potential / Abnormal Thoughts: Suicidal ideation - None  Homicidal ideation - None  Potential for aggression - No       Consciousness:  Alert & Awake   Sensorium:  Grossly oriented   Attention: attention span and concentration are age appropriate       Fund of Knowledge:  Memory: awareness of current events: yes  recent and remote memory grossly intact   Insight:  good   Judgment: good     Lab Review: I have reviewed all pertinent labs  Lab Results   Component Value Date    HGBA1C 5 0 10/03/2020       Lab Results   Component Value Date    WBC 6 70 09/05/2020    HGB 13 2 09/05/2020 HCT 39 0 (L) 09/05/2020    MCV 95 09/05/2020     09/05/2020       Lab Results   Component Value Date    OOP8NXHRNRWI 2 950 09/05/2020           ASSESSMENT & PLAN          Diagnoses and all orders for this visit:    Current moderate episode of major depressive disorder, unspecified whether recurrent (HCC)    PEPE (generalized anxiety disorder)      Current Outpatient Medications   Medication Sig Dispense Refill    busPIRone (BUSPAR) 5 mg tablet Take 1 tablet (5 mg total) by mouth 3 (three) times a day 90 tablet 2    docusate sodium (COLACE) 100 mg capsule Take 100 mg by mouth 2 (two) times a day      sertraline (ZOLOFT) 100 mg tablet Take 1 tablet (100 mg total) by mouth daily 90 tablet 0    traZODone (DESYREL) 50 mg tablet Take 0 5 tablets (25 mg total) by mouth daily at bedtime 45 tablet 0     No current facility-administered medications for this visit  Plan:       Karlene Robles decreased trazodone for tolerance -will cont 25 mg q bedtime  Cont buspar 5 mg tid and zoloft 100 mg/d  Reviewed risks, benefits, side effects of medications, including no medication  Patient understands and agrees to treatment plan  Cont f/u with Lisbeth Ortega for ind therapy  F/u Pa-C 2 mths, sooner prn       Patient has been informed of 24 hours and weekend coverage for urgent situations accessed by calling the main clinic phone number       Patria Spivey, PA-C

## 2021-04-08 ENCOUNTER — TELEMEDICINE (OUTPATIENT)
Dept: BEHAVIORAL/MENTAL HEALTH CLINIC | Facility: CLINIC | Age: 31
End: 2021-04-08
Payer: COMMERCIAL

## 2021-04-08 DIAGNOSIS — F32.A DEPRESSION, UNSPECIFIED DEPRESSION TYPE: Primary | ICD-10-CM

## 2021-04-08 DIAGNOSIS — F41.1 GAD (GENERALIZED ANXIETY DISORDER): ICD-10-CM

## 2021-04-08 PROCEDURE — 90834 PSYTX W PT 45 MINUTES: CPT | Performed by: SOCIAL WORKER

## 2021-04-08 NOTE — PSYCH
This note was not shared with the patient due to this is a psychotherapy note     Virtual Regular Visit      Assessment/Plan:    Problem List Items Addressed This Visit        Other    Depression - Primary    PEPE (generalized anxiety disorder)               Reason for visit is VIRTUAL PSYCHOTHERAPY SESSION DUE TO COVID-19     Encounter provider [facility]NDI [facility]    Provider located at [address] [place] [id number] [phone]    The patient was identified by name and date of birth  Vikram Rodriguez was informed that this is a telemedicine visit and that the visit is being conducted through [employer] and patient was informed that this is a secure, HIPAA-compliant platform  She agrees to proceed     My office door was closed  No one else was in the room  She acknowledged consent and understanding of privacy and security of the video platform  The patient has agreed to participate and understands they can discontinue the visit at any time  Patient is aware this is a billable service  Subjective  Vikram Rodriguez is a 26 y o  female   Psychotherapy Provided: Individual Psychotherapy 50 minutes     Length of time in session: 50 minutes, follow up in 2 week    Goals addressed in session: Goal 1     Pain:      none    0    Current suicide risk : Low      Met with Archana  She states, "I just keep having shitty days "  Her recent stressors include difficulty in soothing her daughter more recently and conflict in the workplace  She states, "I just want to not care "      Session focused on allowing Archana to vent, validating that she is coping with many stressors in her environment, and focusing on mindfulness/distress tolerance and problem solving skills to manage symptoms of depression and anxiety using CBT and DBT     She processed her emotions about conflict in the workplace with her supervisor and being denied a transfer  She became tearful and stated, "my feeling are hurt "   At this time, she is coping with her worry about work by drinking a beer when she gets home  We discussed healthy activities she can do before, during, and after the work day to manage symptoms of anxiety  We discussed using [facility] with her supervisor to effectively express her wants/needs  Alanna Milian was somewhat resistive towards wellness tools discussed identifying lack of time in and out of work and a lack of a relationship with her supervisor as a barrier  Alanna Milian is encouraged to implement wellness tools learned in session  Continue biweekly support  Mental status:  Appearance calm and cooperative , adequate hygiene and grooming and good eye contact    Mood depressed and anxious, slightly irritable   Affect affect was tearful   Speech a normal rate and volume   Thought Processes coherent/organized and normal thought processes   Hallucinations no hallucinations present    Thought Content no delusions   Abnormal Thoughts no suicidal thoughts  and no homicidal thoughts    Orientation  oriented to person and place and time   Remote Memory short term memory intact and long term memory intact   Attention Span concentration intact   Intellect Appears to be of Average Intelligence   Fund of Knowledge displays adequate knowledge of current events   Insight fair   Judgement fair   Muscle Strength Unable to assess due to virtual session   Language no difficulty naming common objects   Pain none   Pain Scale 0         PEPE-7 Flowsheet Screening      Most Recent Value   Over the last two weeks, how often have you been bothered by the following problems?     Feeling nervous, anxious, or on edge  2   Not being able to stop or control worrying  2   Worrying too much about different things  2   Trouble relaxing   1   Being so restless that it's hard to sit still  0   Becoming easily annoyed or irritable   2   Feeling afraid as if something awful might happen  1   How difficult have these problems made it for you to do your work, take care of things at home, or get along with other people? Very difficult   PEPE Score   10        PHQ-9 Depression Screening    PHQ-9:   Frequency of the following problems over the past two weeks:      Little interest or pleasure in doing things: 0 - not at all  Feeling down, depressed, or hopeless: 1 - several days  Trouble falling or staying asleep, or sleeping too much: 0 - not at all  Feeling tired or having little energy: 0 - not at all  Poor appetite or overeatin - several days  Feeling bad about yourself - or that you are a failure or have let yourself or your family down: 1 - several days  Trouble concentrating on things, such as reading the newspaper or watching television: 1 - several days  Moving or speaking so slowly that other people could have noticed  Or the opposite - being so fidgety or restless that you have been moving around a lot more than usual: 0 - not at all  Thoughts that you would be better off dead, or of hurting yourself in some way: 0 - not at all  PHQ-2 Score: 1  PHQ-9 Score: [address]: Diagnosis and Treatment Plan explained to Tigist Allen relates understanding diagnosis and is agreeable to Treatment Plan  Yes       I spent 50 minutes directly with the patient during this visit      VIRTUAL VISIT DISCLAIMER    Last Gonzalez acknowledges that she has consented to an online visit or consultation  She understands that the online visit is based solely on information provided by her, and that, in the absence of a face-to-face physical evaluation by the physician, the diagnosis she receives is both limited and provisional in terms of accuracy and completeness  This is not intended to replace a full medical face-to-face evaluation by the physician  Last Gonzalez understands and accepts these terms

## 2021-04-21 ENCOUNTER — TELEPHONE (OUTPATIENT)
Dept: OBGYN CLINIC | Facility: MEDICAL CENTER | Age: 31
End: 2021-04-21

## 2021-04-21 DIAGNOSIS — G56.00 CARPAL TUNNEL SYNDROME, UNSPECIFIED LATERALITY: Primary | ICD-10-CM

## 2021-04-21 NOTE — TELEPHONE ENCOUNTER
[facility] called, they need the order Dr Ash put in for physical therapy to be changed to occupational therapy since carpal tunnel is considered occupational therapy, please review

## 2021-04-26 ENCOUNTER — TELEPHONE (OUTPATIENT)
Dept: PSYCHIATRY | Facility: CLINIC | Age: 31
End: 2021-04-26

## 2021-04-26 NOTE — TELEPHONE ENCOUNTER
Left a message to see if Alanna Milian wanted to take one of Simi's available appointments this week

## 2021-05-05 ENCOUNTER — EVALUATION (OUTPATIENT)
Dept: OCCUPATIONAL THERAPY | Facility: CLINIC | Age: 31
End: 2021-05-05
Payer: COMMERCIAL

## 2021-05-05 DIAGNOSIS — M25.532 LEFT WRIST PAIN: ICD-10-CM

## 2021-05-05 DIAGNOSIS — M25.531 RIGHT WRIST PAIN: Primary | ICD-10-CM

## 2021-05-05 PROCEDURE — 97035 APP MDLTY 1+ULTRASOUND EA 15: CPT

## 2021-05-05 PROCEDURE — 97165 OT EVAL LOW COMPLEX 30 MIN: CPT

## 2021-05-05 PROCEDURE — 97535 SELF CARE MNGMENT TRAINING: CPT

## 2021-05-05 NOTE — PROGRESS NOTES
OT Evaluation     Today's date: 2021  Patient name: Michele Donato  : 1990  MRN: [id number]  Referring provider: Jessika Landers MD  Dx:   Encounter Diagnosis     ICD-10-CM    1  Right wrist pain  M25 531    2  Left wrist pain  M25 532        Start Time: 1700  Stop Time: 1800  Total time in clinic (min): 60 minutes    Assessment  Assessment details: Patient presenting with OP OT services with a diagnosis of CTS  Patient began experiencing CTS nineteen months ago during her third trimester of her recent pregnancy  Patient initially was experiencing numbness and tingling in bilateral hands  Patient reports those symptoms have improved since the birth of her daughter sixteen months ago  Patient is now complaining of bilateral thumb/radial wrist pain espeically when holding her daughter and during nursing  Patient does note tenderness to that area  Patient reports symptoms are inconsistent  Patient is in ER registation  Patient noted increased symptoms with typing at work and use of cell phone as well  Patient's next follow-up appointment is on 2021  Impairments: abnormal coordination, abnormal or restricted ROM and impaired physical strength    Symptom irritability: moderateUnderstanding of Dx/Px/POC: good   Prognosis: good    Goals  STGs    Pt will increase  strength by 5-10#  Pt will increase wrist strength by 1/2 grade  Pt will report decrease in symptoms while nursing by 25%    Independent with HEP    LTGs     Pt will increase  strength by an additional 5-10#  Pt will increase wrist strength by 1-2 grade  Pt will report decrease in symptoms while nursing by 50%    Pt will report an increase in ADL/IADL participation        Plan  Plan details: Patient has presenting to OP OT services with a dx of CTS  Patient demonstrating increased pain, decreased strength, decreased ROM and decreased activity   Pt would benefit from continued Occupational Therapy services two times per week for 4 weeks to return to prior level of function and achieve all established goals  Thank you for the referral!    Patient would benefit from: OT eval, skilled occupational therapy and custom splinting  Referral necessary: Yes  Planned modality interventions: ultrasound, unattended electrical stimulation, thermotherapy: hydrocollator packs, cryotherapy and thermotherapy: paraffin bath  Planned therapy interventions: massage, manual therapy, joint mobilization, patient education, strengthening, stretching, fine motor coordination training, home exercise program, neuromuscular re-education, self care, therapeutic exercise and therapeutic activities  Frequency: 1x week  Duration in visits: 4  Duration in weeks: 4  Treatment plan discussed with: patient        Subjective Evaluation    History of Present Illness  Onset date: Nineteen Months    Mechanism of injury: Mommy's Wrist          Not a recurrent problem   Quality of life: good    Pain  Current pain ratin  At best pain ratin  At worst pain ratin  Location: B/L Thumbs  Quality: dull ache  Relieving factors: heat and support    Hand dominance: right    Treatments  Previous treatment: immobilization  Current treatment: occupational therapy  Patient Goals  Patient goals for therapy: decreased pain, increased motion, increased strength and independence with ADLs/IADLs          Objective     Neurological Testing     Sensation     Wrist/Hand   Left   Intact: light touch    Right   Intact: light touch    Active Range of Motion     Left Wrist   Normal active range of motion    Right Wrist   Normal active range of motion    Left Thumb   Opposition: WNL    Right Thumb   Opposition: WNL    Additional Active Range of Motion Details  Composite fist noted    Strength/Myotome Testing     Left Wrist/Hand   Wrist extension: 3  Wrist flexion: 3  Radial deviation: 3  Ulnar deviation: 3     (2nd hand position)     Trial 1: 45    Right Wrist/Hand   Wrist extension: 3  Wrist flexion: 3  Radial deviation: 3  Ulnar deviation: 3     (2nd hand position)     Trial 1: 55    Tests     Left Wrist/Hand   Negative AIN OK sign, Finkelstein's and Froment's sign  Right Wrist/Hand   Negative AIN OK sign, Finkelstein's and Froment's sign  Swelling     Left Wrist/Hand   Circumference wrist: 14 8 cm    Right Wrist/Hand   Circumference wrist: 14 6 cm  Neuro Exam:     Functional outcomes   Left 9 peg hole test: 19 75 (seconds)  Right 9 hole peg test: 17 84 (seconds)       Patient tolerated session well  Patient and therapist discussed exercises as per initial HEP  Patient verbalized understanding of education and demonstrated proper technique  Therapist will make increases as tolerated   Continue with POC          Precautions Mommy's Wrist        Manuals 05/05/2021       Wrist Extensor Stretch  Thumb Ext  Thumb ABD        Manual Massage        IASTM Dorsal Thumb        KT Tape Dorsal Aspect of Thumb From Tip to Radial Styloid  Horizontal Wrist               Neuro Re-Ed  05/05/2021                                                               Ther Ex 05/05/2021       Wrist AROM  Flex/Ext  RD/UD        Wrist Maze        Digi-Flex        Hand Power Pro        Flex Bar   Twists  Bends        Wrist Isometrics  Flexion  Extension  RD  UD                Ther Activity 05/05/2021       Tension Pin Pom Pom        Grooved Peg Board                                Modalities 05/05/2021        Performed at end of session       Ultrasound  Wrist 3 3 MHz  50%  0 8 intensity

## 2021-05-06 ENCOUNTER — TELEMEDICINE (OUTPATIENT)
Dept: BEHAVIORAL/MENTAL HEALTH CLINIC | Facility: CLINIC | Age: 31
End: 2021-05-06
Payer: COMMERCIAL

## 2021-05-06 DIAGNOSIS — F32.1 CURRENT MODERATE EPISODE OF MAJOR DEPRESSIVE DISORDER, UNSPECIFIED WHETHER RECURRENT (HCC): Primary | ICD-10-CM

## 2021-05-06 DIAGNOSIS — F41.1 GAD (GENERALIZED ANXIETY DISORDER): ICD-10-CM

## 2021-05-06 PROCEDURE — 90834 PSYTX W PT 45 MINUTES: CPT | Performed by: SOCIAL WORKER

## 2021-05-06 NOTE — PSYCH
This note was not shared with the patient due to this is a psychotherapy note     Virtual Regular Visit      Assessment/Plan:    Problem List Items Addressed This Visit        Other    Depression - Primary    PEPE (generalized anxiety disorder)          Goals addressed in session: Goal 1          Reason for visit is VIRTUAL PSYCHOTHERAPY SESSION DUE TO COVID-19     Encounter provider [facility]    Provider located at [address] [place] [id number] [phone]    The patient was identified by name and date of birth  Ashely Silver was informed that this is a telemedicine visit and that the visit is being conducted through [employer] and patient was informed that this is a secure, HIPAA-compliant platform  She agrees to proceed     My office door was closed  No one else was in the room  She acknowledged consent and understanding of privacy and security of the video platform  The patient has agreed to participate and understands they can discontinue the visit at any time  Patient is aware this is a billable service  Subjective  Ashely Silver is a 26 y o  female   Psychotherapy Provided: Individual Psychotherapy 40 minutes     Length of time in session: 40 minutes, follow up in 2 week    Goals addressed in session: Goal 1     Pain:      mild    3    Current suicide risk : Low     Met with Archana  She states, "I'm stressed out "  She continues to have conflict with her manager in the workplace and recently received a write up  She is arguing the write up and has a meeting with HR later today  She states, "they wrote me up for a negative tone "    Session focused on Archana processing her thoughts and feelings about her recent conflict in the workplace and reviewing effective interpersonal skills using DBT    We discussed how she wrote a letter to HR using the [facility] skill to effectively assert her wants/needs after being written up  She processed her emotions about the write up  She shared how she has not told anyone due to embarrassment  6 Wei Noriega was encouraged to explore her own behaviors in the workplace which led to the write up by her manager  6 Wei Noriega shows some insight into her negative tone; however, places blame on others for the reasoning of it  We discussed being responsible for her own actions/behaviors  6 Wei Noriega shared how her  has told her she can be harsh at times  We discussed becoming more aware of her tone and wording; especially with specific audiences  We discussed the use of STOP skill prior to responding to check in with self to make sure 1) is this going to be productive for me? 2) is this going to be productive for the other person  Her meeting with HR is scheduled for 2:30PM this afternoon  6 Wei Noriega will continue to implement wellness tools learned in session with focus on DBT skills  Continue biweekly support         Mental status:  Appearance adequate hygiene and grooming and good eye contact    Mood anxious and slightly irritable   Affect Mood congrient   Speech a normal rate and volume   Thought Processes coherent/organized and normal thought processes   Hallucinations no hallucinations present    Thought Content no delusions   Abnormal Thoughts no suicidal thoughts  and no homicidal thoughts    Orientation  oriented to person and place and time   Remote Memory short term memory intact and long term memory intact   Attention Span concentration intact   Intellect Appears to be of Average Intelligence   Fund of Knowledge displays adequate knowledge of current events   Insight fair   Judgement fair   Muscle Strength Unable to assess due to virtual session   Language no difficulty naming common objects   Pain mild   Pain Scale 3       [address]: Diagnosis and Treatment Plan explained to Nelly Gonzalez relates understanding diagnosis and is agreeable to Treatment Plan  Yes       I spent 40 minutes directly with the patient during this visit      VIRTUAL VISIT DISCLAIMER    Zoe Segal acknowledges that she has consented to an online visit or consultation  She understands that the online visit is based solely on information provided by her, and that, in the absence of a face-to-face physical evaluation by the physician, the diagnosis she receives is both limited and provisional in terms of accuracy and completeness  This is not intended to replace a full medical face-to-face evaluation by the physician  Zoe Segal understands and accepts these terms

## 2021-05-12 ENCOUNTER — OFFICE VISIT (OUTPATIENT)
Dept: OCCUPATIONAL THERAPY | Facility: CLINIC | Age: 31
End: 2021-05-12
Payer: COMMERCIAL

## 2021-05-12 DIAGNOSIS — M25.532 LEFT WRIST PAIN: ICD-10-CM

## 2021-05-12 DIAGNOSIS — M25.531 RIGHT WRIST PAIN: Primary | ICD-10-CM

## 2021-05-12 PROCEDURE — 97140 MANUAL THERAPY 1/> REGIONS: CPT

## 2021-05-12 PROCEDURE — 97530 THERAPEUTIC ACTIVITIES: CPT

## 2021-05-12 PROCEDURE — 97035 APP MDLTY 1+ULTRASOUND EA 15: CPT

## 2021-05-12 PROCEDURE — 97110 THERAPEUTIC EXERCISES: CPT

## 2021-05-12 NOTE — PROGRESS NOTES
Daily Note     Today's date: 2021  Patient name: Sujey Jaramillo  : 1990  MRN: [id number]  Referring provider: Maria T Waller MD  Dx:   Encounter Diagnosis     ICD-10-CM    1  Right wrist pain  M25 531    2  Left wrist pain  M25 532                   Subjective: "The exercises hurt my right wrist "      Objective: See treatment diary below      Assessment: Tolerated treatment well  Patient exhibited good technique with therapeutic exercises and would benefit from continued OT  Patient tolerated additional manual techniques and stretches this date without issue  Patient reports compliance with HEP and stated discomfort with use of rubber band exercises  Patient instructed to completed exercises in pain free manner  Patient reports right wrist is more sore than before her initial appointment  Patient did state she also worked the day her right wrist was hurting  Therapist will continue to make progress as tolerated  Plan: Continue per plan of care  Progress treatment as tolerated            Precautions: Mommy's Wrist        Manuals 2021      Wrist Extensor Stretch  Thumb Ext  Thumb ABD  30 sec x 3    30 sec x 3  30 sec x 3      Manual Massage  Performed Thenar Eminance      IASTM Dorsal Thumb        KT Tape Dorsal Aspect of Thumb From Tip to Radial Styloid  Horizontal Wrist               Neuro Re-Ed  2021                                                               Ther Ex 2021      Wrist AROM  Flex/Ext  RD/UD        Wrist Maze        Digi-Flex Red 2 x 10 Red 2 x 10      Hand Power Pro        Flex Bar   Twists  Bends        Wrist Isometrics  Flexion  Extension  RD  UD        Hammer Stretch  10 sec x 3              Ther Activity 2021      Tension Pin Pom Pom  Red  Half R  Half L Yellow      Grooved Peg Board                                Modalities 2021       Performed at end of session Performed at end of session Ultrasound  B/L Wrist 3 3 MHz  50%  0 8 intensity 3 3 MHz  50%  0 8 intensity

## 2021-05-17 ENCOUNTER — APPOINTMENT (OUTPATIENT)
Dept: OCCUPATIONAL THERAPY | Facility: CLINIC | Age: 31
End: 2021-05-17
Payer: COMMERCIAL

## 2021-05-18 ENCOUNTER — TELEMEDICINE (OUTPATIENT)
Dept: BEHAVIORAL/MENTAL HEALTH CLINIC | Facility: CLINIC | Age: 31
End: 2021-05-18
Payer: COMMERCIAL

## 2021-05-18 DIAGNOSIS — F41.1 GAD (GENERALIZED ANXIETY DISORDER): ICD-10-CM

## 2021-05-18 DIAGNOSIS — F32.0 CURRENT MILD EPISODE OF MAJOR DEPRESSIVE DISORDER, UNSPECIFIED WHETHER RECURRENT (HCC): Primary | ICD-10-CM

## 2021-05-18 PROCEDURE — 90834 PSYTX W PT 45 MINUTES: CPT | Performed by: SOCIAL WORKER

## 2021-05-18 NOTE — PSYCH
This note was not shared with the patient due to this is a psychotherapy note     Virtual Regular Visit      Assessment/Plan:    Problem List Items Addressed This Visit        Other    Depression - Primary    PEPE (generalized anxiety disorder)          Goals addressed in session: Goal 1          Reason for visit is VIRTUAL PSYCHOTHERAPY SESSION DUE TO COVID-19     Encounter provider [facility]    Provider located at [address] [place] [id number] [phone]    The patient was identified by name and date of birth  Zulay Goodrich was informed that this is a telemedicine visit and that the visit is being conducted through [employer] and patient was informed that this is a secure, HIPAA-compliant platform  She agrees to proceed     My office door was closed  No one else was in the room  She acknowledged consent and understanding of privacy and security of the video platform  The patient has agreed to participate and understands they can discontinue the visit at any time  Patient is aware this is a billable service  Subjective  Zulay Goodrich is a 26 y o  female   Met with Kiesha Camejo  She states, "I am doing okay "   She had the meeting with her employer and HR; disciplinary action stands  She states, "I was really disappointed "   She is exploring job options with other employers; however, feels it would be best if she completed her 1 year in her current department and transfers  She states, "I'm just not going to say anything there "  She was encouraged to explore whether this was an ineffective vs effective action for her emotional health in the workplace  Kiesha Camejo was resistive towards discussing effective communication skills to assert her wants and needs in the workplace    This provider asked her about her home life in which she responded, "I am just trying to survive "      Session focused on Archana processing her emotions about conflict in her marriage  Corrie Connolly states, "he is just miserable "   She processed her thoughts and feelings about how their relationship changed when their baby was born  She processed her emotions about feeling unattractive due to their lack of intimacy over the past several months due to vaginal dryness after giving birth  She processed her emotions about her 's history of opiate and gambling abuse and how he is currently struggling with depression and drinking alcohol to cope  Validation was provided for the many stressors she is coping with  She states, "I didn't tell you about this because I was afraid you'd think he was a loser "   We discussed developing skills for open communication and improved emotional and sexual intimacy  Corrie Connolly will continue to implement wellness tools learned in session  Continue biweekly support  Mental status:  Appearance calm and cooperative , adequate hygiene and grooming and good eye contact    Mood depressed   Affect affect was constricted   Speech a normal rate and volume   Thought Processes coherent/organized and normal thought processes   Hallucinations no hallucinations present    Thought Content no delusions   Abnormal Thoughts no suicidal thoughts  and no homicidal thoughts    Orientation  oriented to person and place and time   Remote Memory short term memory intact and long term memory intact   Attention Span concentration intact   Intellect Appears to be of Average Intelligence   Fund of Knowledge displays adequate knowledge of current events   Insight fair   Judgement fair   Muscle Strength Unable to assess due to virtual session   Language no difficulty naming common objects   Pain none   Pain Scale 0       I spent 40 minutes directly with the patient during this visit      [address]    Annita Connolly acknowledges that she has consented to an online visit or consultation   She understands that the online visit is based solely on information provided by her, and that, in the absence of a face-to-face physical evaluation by the physician, the diagnosis she receives is both limited and provisional in terms of accuracy and completeness  This is not intended to replace a full medical face-to-face evaluation by the physician  Zeke Sinha understands and accepts these terms

## 2021-05-19 ENCOUNTER — OFFICE VISIT (OUTPATIENT)
Dept: OCCUPATIONAL THERAPY | Facility: CLINIC | Age: 31
End: 2021-05-19
Payer: COMMERCIAL

## 2021-05-19 DIAGNOSIS — M25.532 LEFT WRIST PAIN: ICD-10-CM

## 2021-05-19 DIAGNOSIS — M25.531 RIGHT WRIST PAIN: Primary | ICD-10-CM

## 2021-05-19 PROCEDURE — 97110 THERAPEUTIC EXERCISES: CPT

## 2021-05-19 PROCEDURE — 97035 APP MDLTY 1+ULTRASOUND EA 15: CPT

## 2021-05-19 PROCEDURE — 97140 MANUAL THERAPY 1/> REGIONS: CPT

## 2021-05-19 NOTE — PROGRESS NOTES
Daily Note     Today's date: 2021  Patient name: Kika Miller  : 1990  MRN: [id number]  Referring provider: Mervat Reyes MD  Dx:   Encounter Diagnosis     ICD-10-CM    1  Right wrist pain  M25 531    2  Left wrist pain  M25 532                    Subjective: "Nothing has really changed "      Objective: See treatment diary below      Assessment: Tolerated treatment well  Patient exhibited good technique with therapeutic exercises and would benefit from continued OT  Pt reports no major changes since last session  She reports follow-through with HEP and stated rubber-band exercise increases symptoms  Pt demonstrates pain in thenar eminence and webspace of digits one and two of b/l hands and describes it as a pinching feeling  Pt tolerated additional wrist exercises this session  She reports symptoms are worse in R hand because she uses it more  Pt complained of neck pain this session  Therapist educated patient on and demonstrated neck stretches for patient to try at home  Plan: Continue per plan of care  Progress treatment as tolerated            Precautions: Mommy's Wrist        Manuals 2021     Wrist Extensor Stretch  Thumb Ext  Thumb ABD  30 sec x 3    30 sec x 3  30 sec x 3 30 sec x 3    30 sec x 3  30 sec x 3     Manual Massage  Performed Thenar Eminance Performed  Thenar  Eminance     IASTM Dorsal Thumb        KT Tape Dorsal Aspect of Thumb From Tip to Radial Styloid  Horizontal Wrist               Neuro Re-Ed                                                                 Ther Ex 2021     Wrist AROM  Flex/Ext  RD/UD        Wrist Maze   X 5 B/L     Digi-Flex Red 2 x 10 Red 2 x 10 Red 2 x 10 B/L     Hand Power Pro   Green   B/L x 20     Flex Bar   Twists  Bends        Wrist Isometrics  Flexion  Extension  RD  UD        Hammer Stretch  10 sec x 3 10 sec x 3             Ther Activity 2021     Tension Pin Pom Pom  Red  Half R  Half L Yellow Red   B/L     Grooved Peg Board                                Modalities 05/05/2021 05/12/2021 05/19/2021     MH Performed at end of session Performed at end of session      Ultrasound  B/L Wrist 3 3 MHz  50%  0 8 intensity 3 3 MHz  50%  0 8 intensity 3  3MHz  50%  0 8 Intensity            Documentation and treatment completed this session by LUISITO Antonio under the direct supervision of Bell Vi MS, OTR/L

## 2021-05-26 ENCOUNTER — OFFICE VISIT (OUTPATIENT)
Dept: OCCUPATIONAL THERAPY | Facility: CLINIC | Age: 31
End: 2021-05-26
Payer: COMMERCIAL

## 2021-05-26 DIAGNOSIS — M25.532 LEFT WRIST PAIN: ICD-10-CM

## 2021-05-26 DIAGNOSIS — M25.531 RIGHT WRIST PAIN: Primary | ICD-10-CM

## 2021-05-26 PROCEDURE — 97110 THERAPEUTIC EXERCISES: CPT

## 2021-05-26 PROCEDURE — 97035 APP MDLTY 1+ULTRASOUND EA 15: CPT

## 2021-05-26 PROCEDURE — 97140 MANUAL THERAPY 1/> REGIONS: CPT

## 2021-05-26 NOTE — PROGRESS NOTES
Daily Note/Discharge     Today's date: 2021  Patient name: Zulay Goodrich  : 1990  MRN: [id number]  Referring provider: Belkis Flores MD  Dx:   Encounter Diagnosis     ICD-10-CM    1  Right wrist pain  M25 531    2  Left wrist pain  M25 532                   Subjective: "My left side is feeling better "      Objective: See treatment diary below      Assessment: Tolerated treatment well  Patient exhibited good technique with therapeutic exercises and would benefit from continued OT  Patient reports decrease in symptoms in left wrist and verbalized modifications with activities at home  Patient tolerated additional exercises this session and demonstrates compliance with HEP  Therapist reviewed HEP and use of heat at home         Plan: D/c OT services per patients request         Precautions: Mommy's Wrist        Manuals 2021    Wrist Extensor Stretch  Thumb Ext  Thumb ABD  30 sec x 3    30 sec x 3  30 sec x 3 30 sec x 3    30 sec x 3  30 sec x 3 30 sec x 3    30 sec x 3  30 sec x 3    Manual Massage  Performed Thenar Eminance Performed  Thenar  Eminance Performed Thenar Eminance    IASTM Dorsal Thumb        KT Tape Dorsal Aspect of Thumb From Tip to Radial Styloid  Horizontal Wrist               Neuro Re-Ed                                                                 Ther Ex 2021    Wrist AROM  Flex/Ext  RD/UD      2# x 20  2# x 20    Wrist Maze   X 5 B/L X 5 B/L    Digi-Flex Red 2 x 10 Red 2 x 10 Red 2 x 10 B/L Red 2 x 10 B/L    Hand Power Pro   Green   B/L x 20 Green  B/L x 20    Flex Bar   Twists  Bends    Cream  X 20  X 20    Wrist Isometrics  Flexion  Extension  RD  UD        Hammer Stretch  10 sec x 3 10 sec x 3 10 sec x 3            Ther Activity 2021    Tension Pin Pom Pom  Red  Half R  Half L Yellow Red   B/L Red B/L    Grooved Peg Board                                Modalities 05/05/2021 05/12/2021 05/19/2021 05/26/2021    [address] Performed at end of session Performed at end of session  Performed at end of session    Ultrasound  B/L Wrist 3 3 MHz  50%  0 8 intensity 3 3 MHz  50%  0 8 intensity 3  3MHz  50%  0 8 Intensity 3 3 MHz  50%  0 8 Intensity           Documentation and treatment completed this session by Dez LUISITO Churchill under the direct supervision of Arabella Lebron MS, OTR/L

## 2021-06-03 ENCOUNTER — TELEMEDICINE (OUTPATIENT)
Dept: BEHAVIORAL/MENTAL HEALTH CLINIC | Facility: CLINIC | Age: 31
End: 2021-06-03
Payer: COMMERCIAL

## 2021-06-03 DIAGNOSIS — F32.0 CURRENT MILD EPISODE OF MAJOR DEPRESSIVE DISORDER, UNSPECIFIED WHETHER RECURRENT (HCC): Primary | ICD-10-CM

## 2021-06-03 DIAGNOSIS — F41.1 GAD (GENERALIZED ANXIETY DISORDER): ICD-10-CM

## 2021-06-03 PROCEDURE — 90834 PSYTX W PT 45 MINUTES: CPT | Performed by: SOCIAL WORKER

## 2021-06-03 NOTE — PSYCH
This note was not shared with the patient due to this is a psychotherapy note     Virtual Regular Visit      Assessment/Plan:    Problem List Items Addressed This Visit        Other    Depression - Primary    PEPE (generalized anxiety disorder)          Goals addressed in session: Goal 1          Reason for visit is VIRTUAL PSYCHOTHERAPY SESSION DUE TO COVID-19     Encounter provider Karel Ruiz    Provider located at [address] [place] [id number] [phone]    The patient was identified by name and date of birth  [facility] was informed that this is a telemedicine visit and that the visit is being conducted through [employer] and patient was informed that this is a secure, HIPAA-compliant platform  She agrees to proceed     My office door was closed  No one else was in the room  She acknowledged consent and understanding of privacy and security of the video platform  The patient has agreed to participate and understands they can discontinue the visit at any time  Patient is aware this is a billable service  Subjective  [facility] is a 26 y o  female   Psychotherapy Provided: Individual Psychotherapy 45 minutes     Length of time in session: 45 minutes, follow up in 2 week    Goals addressed in session: Goal 1     Pain:      none    0    Current suicide risk : Low     Met with Archana Cunningham voiced feeling frustrated because she just ended a phone call with her mother and her mother forgot she was babysitting her child tomorrow while Edson Cunningham worked  Archana briefly vented and voiced she planned to call her mother after session to make a plan  Session focused on Archana processing her emotions about recent marital strain and developing skills for open communication and improved emotional and sexual intimacy  She discussed her use of skills and challenges since last session     Archana processed her thoughts and feelings about her and her 's need to have better balance in their relationship  We discussed CBT tool, ACE and she was encouraged to reflect on this through a home exercise  Yuval Ortiz will continue to implement wellness tools learned in session  Continue biweekly support  Mental status:  Appearance calm and cooperative , adequate hygiene and grooming and good eye contact    Mood depressed   Affect Mood congruent   Speech a normal rate and volume   Thought Processes coherent/organized and normal thought processes   Hallucinations no hallucinations present    Thought Content no delusions   Abnormal Thoughts no suicidal thoughts  and no homicidal thoughts    Orientation  oriented to person and place and time   Remote Memory short term memory intact and long term memory intact   Attention Span concentration intact   Intellect Appears to be of Average Intelligence   Fund of Knowledge displays adequate knowledge of current events   Insight fair   Judgement fair   Muscle Strength Unable to assess due to virtual session   Language no difficulty naming common objects   Pain none   Pain Scale 0           [address]: Diagnosis and Treatment Plan explained to Jorge Gutierrez relates understanding diagnosis and is agreeable to Treatment Plan  Yes     I spent 45 minutes directly with the patient during this visit      VIRTUAL VISIT DISCLAIMER    Sheila Evans acknowledges that she has consented to an online visit or consultation  She understands that the online visit is based solely on information provided by her, and that, in the absence of a face-to-face physical evaluation by the physician, the diagnosis she receives is both limited and provisional in terms of accuracy and completeness  This is not intended to replace a full medical face-to-face evaluation by the physician  Sheila Evans understands and accepts these terms

## 2021-06-09 DIAGNOSIS — Z34.93 THIRD TRIMESTER PREGNANCY: ICD-10-CM

## 2021-06-09 DIAGNOSIS — F41.9 ANXIETY: ICD-10-CM

## 2021-06-09 RX ORDER — TRAZODONE HYDROCHLORIDE 50 MG/1
25 TABLET ORAL
Qty: 45 TABLET | Refills: 0 | Status: SHIPPED | OUTPATIENT
Start: 2021-06-09 | End: 2021-09-29 | Stop reason: SDUPTHER

## 2021-06-09 RX ORDER — SERTRALINE HYDROCHLORIDE 100 MG/1
100 TABLET, FILM COATED ORAL DAILY
Qty: 90 TABLET | Refills: 0 | Status: SHIPPED | OUTPATIENT
Start: 2021-06-09 | End: 2021-07-23 | Stop reason: SDUPTHER

## 2021-06-09 RX ORDER — BUSPIRONE HYDROCHLORIDE 5 MG/1
5 TABLET ORAL 3 TIMES DAILY
Qty: 90 TABLET | Refills: 1 | Status: SHIPPED | OUTPATIENT
Start: 2021-06-09 | End: 2021-07-23 | Stop reason: SDUPTHER

## 2021-06-16 ENCOUNTER — OFFICE VISIT (OUTPATIENT)
Dept: PHYSICAL THERAPY | Facility: CLINIC | Age: 31
End: 2021-06-16
Payer: COMMERCIAL

## 2021-06-16 DIAGNOSIS — M76.62 ACHILLES TENDINITIS OF LEFT LOWER EXTREMITY: Primary | ICD-10-CM

## 2021-06-16 PROCEDURE — 97161 PT EVAL LOW COMPLEX 20 MIN: CPT | Performed by: PHYSICAL MEDICINE & REHABILITATION

## 2021-06-16 PROCEDURE — 97110 THERAPEUTIC EXERCISES: CPT | Performed by: PHYSICAL MEDICINE & REHABILITATION

## 2021-06-16 NOTE — PROGRESS NOTES
PT Evaluation     Today's date: 2021  Patient name: Raheem Huang  : 1990  MRN: [id number]  Referring provider: EMMY Leiva  Dx:   Encounter Diagnosis     ICD-10-CM    1  Achilles tendinitis of left lower extremity  M76 62                   Assessment  Assessment details: Raheem Huang is a 26 y o  female presenting to outpatient physical therapy with signs /sx consistent with Achilles tendinitis of left lower extremity  (primary encounter diagnosis)  Patient's current impairments include L heel pain, impaired soft tissue mobility L calf,  reduced range of motion L ankle, reduced strength, reduced joint awareness and stability L ankle, and reduced activity tolerance  Patient's present functional limitations include difficulty with ADLs with increased need for assistance, reliance on medication and/or modalities for pain relief, poor tolerance for functional mobility and activity, and difficulty completing work and home responsibilities  Patient to benefit from skilled outpatient physical therapy 2x/week for 4 weeks in order to reduce pain, maximize pain free range of motion, increase strength and stability, and improve functional mobility/functional activity in order to maximize return to prior level of function with reduced limitations  Thank you for your referral     Impairments: abnormal muscle firing, abnormal or restricted ROM, activity intolerance, impaired balance, impaired physical strength, lacks appropriate home exercise program and pain with function    Symptom irritability: lowUnderstanding of Dx/Px/POC: good   Prognosis: good    Goals  STGs to be achieved in 4 weeks:  1  Pt to demonstrate reduced subjective pain rating "at worst" by at least 2-3 points from Initial Eval in order to allow for reduced pain with ADLs and improved functional activity tolerance     2  Pt to demonstrate increased AROM of L ankle DF by at least 5-10 degrees in order to allow for greater ease and independence with ADLs and functional mobility with improved foot/ankle mechanics and reduced pain  3  Pt to demonstrate increased MMT of L ankle DF and inversion by at least 1/2-1 grade in order to improve safety and stability with ADLs and functional mobility  LTGs to be achieved in 6-8 weeks:  1  Pt will be I with HEP in order to continue to improve quality of life and independence and reduce risk for re-injury  2  Pt to demonstrate return to normal gait, standing, and stair climbing without pain and without limitations or restrictions  3  Pt to demonstrate improved function as noted by achieving or exceeding predicted score on FOTO outcomes assessment tool  Plan  Patient would benefit from: skilled physical therapy  Planned modality interventions: cryotherapy and thermotherapy: hydrocollator packs  Planned therapy interventions: manual therapy, motor coordination training, neuromuscular re-education, patient education, self care, strengthening, stretching, therapeutic exercise, home exercise program, balance and therapeutic activities  Frequency: 2x week  Duration in visits: 8  Duration in weeks: 4  Plan of Care beginning date: 6/16/2021  Plan of Care expiration date: 7/16/2021  Treatment plan discussed with: patient        Subjective Evaluation    History of Present Illness  Mechanism of injury: Pt notes she jumped off her bottom step 2 months ago; notes she then rolled her L ankle  Notes she was having persistent waxing/waning tendonitis/pain in R/L ankle/heel cord over the past 6 months or more since she has been pregnant/breastfeeding her daughter  Notes when she rolled it 2 months ago, the pain was intense and she fell to the ground  Was able to WB on it, but had to limp  Notes the pain has remained subtle sine and actually improved over the past few days  Notes reduced limping even as the pain persists   Notes her ankle is stiff in the mornings, or if she is "on it too much"; feels a "pulling" in L achilles tendon  Pain is worse with stairs; has to go down sideways  Pain is worse if she "hits it" off something, like the back of a chair  Notes tenderness over her heel on the L  Notes her calves can hurt B  No n/t or sensory changes  Notes some off /on swelling and redness over the back of L heel with a "bump there" at times  Notes pain is better with Tylenol/Advil and heating it  Notes pain is also reduced with rolling her calves  Has not f/u with PCP  Overall her condition is not worsening at this time  No new sx/complaints  Tried some exercises from a friend who is a PT, but felt the pain was worse  Wants to learn exercises she can do to manage her sx and prevent it from coming back  Quality of life: good    Pain  Current pain ratin  At best pain ratin  At worst pain ratin  Location: L heel cord   Quality: dull ache and tight  Relieving factors: rest and heat  Aggravating factors: standing, walking and stair climbing  Progression: improved (over the past few days)    Social Support  Steps to enter house: yes  Stairs in house: no   L[facility] in: 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 )  Hand dominance: right      Diagnostic Tests  No diagnostic tests performed  Treatments  Previous treatment: medication  Current treatment: medication and physical therapy  Current treatment comments: OTC prn  Patient Goals  Patient goals for therapy: decreased pain  Patient's goals regarding treatment: learn exercises I can do at home  Objective     Observations   Left Ankle/Foot   Negative for deformity and edema  Additional Observation Details  No notable swelling, bruising, deformity, redness etc       Tenderness   Left Ankle/Foot   Tenderness in the Achilles insertion and posterior talofibular ligament       Additional Tenderness Details  Moderate ttp mid-distal Dobson's tendon on the L and in region of L PTFL Neurological Testing     Sensation     Ankle/Foot   Left Ankle/Foot   Intact: light touch    Right Ankle/Foot   Intact: light touch     Active Range of Motion   Left Ankle/Foot   Dorsiflexion (ke): 10 degrees   Plantar flexion: 45 degrees with pain  Inversion: 35 degrees with pain  Eversion: 9 degrees     Right Ankle/Foot   Normal active range of motion    Additional Active Range of Motion Details  Mild pain L heel cord with end range PF and inversion with pulling pain into calf       Joint Play   Left Ankle/Foot  Joints within functional limits are the talocrural joint  Strength/Myotome Testing     Left Ankle/Foot   Dorsiflexion: 4  Plantar flexion: 4  Inversion: 4-  Eversion: 4 (4--4/5)    Right Ankle/Foot   Dorsiflexion: 4+  Plantar flexion: 4+  Inversion: 4  Eversion: 4 (4-4+)    Additional Strength Details  No pain with MMT screening  Will cont to assess    Tests   Left Ankle/Foot   Positive for calcaneal squeeze  Negative for anterior drawer, posterior drawer, Bautista, valgus tilt and varus tilt       Additional Tests Details  Normal mid/hindfoot alignment noted in WB without footwear B    R SLS floor WNL  L SLS floor with increased ankle/foot instability with 1-2x LOB over 15-20" with mild heel cord pain     Fair-good L foot/ankle mobility with B LE squatting in standing with mild pain L heel cord       Ambulation     Quality of Movement During Gait     Additional Quality of Movement During Gait Details  Grossly WNL             Precautions: direct access      Re-eval Date: 7/16    Date 6/16       Visit Count 1       FOTO 6/16       Pain In See IE       Pain Out See IE                 Manuals 6/16                                                                Neuro Re-Ed             Romberg    tandem     Floor   L/R   reviewed for HEP            SLS             Star drill              Steamboats foam                                                    Ther Ex             Nustep vs [address] [facility]    Calf stretch       seated vs standing   reviewed             Heel raises B 10x  reviewed             Ankle tband 4 way                                                                               Ther Activity                                       Gait Training                                       Modalities                                             6/16 - HEP was issued and reviewed this date for above noted exercises  Pt demonstrated understanding without incident and without questions/concerns  Will continue to update upcoming

## 2021-06-21 ENCOUNTER — OFFICE VISIT (OUTPATIENT)
Dept: PHYSICAL THERAPY | Facility: CLINIC | Age: 31
End: 2021-06-21
Payer: COMMERCIAL

## 2021-06-21 DIAGNOSIS — M76.62 ACHILLES TENDINITIS OF LEFT LOWER EXTREMITY: Primary | ICD-10-CM

## 2021-06-21 PROCEDURE — 97110 THERAPEUTIC EXERCISES: CPT | Performed by: PHYSICAL MEDICINE & REHABILITATION

## 2021-06-21 NOTE — PROGRESS NOTES
Daily Note     Today's date: 2021  Patient name: Patsy Boeck  : 1990  MRN: [id number]  Referring provider: EMMY Duncan  Dx:   Encounter Diagnosis     ICD-10-CM    1  Achilles tendinitis of left lower extremity  M76 62                   Subjective: Pt notes minimal discomfort L heel at this time  Was "sore" the day after last tx  No new sx/complaints  Somewhat compliant with HEP; denies any sx/complaints with HEP  Objective: See treatment diary below      Assessment: Tolerated treatment well  Weakness noted L ankle Eversion > inversion with PREs  Noted quick calf fatigue B with HR and eccentric L HR  No increase in sx after tx  Reviewed HEP and issued new green tband and handout for ankle PREs ; cautioned pt to monitor for flare up/exacerbation of sx and reduce frequency of Hep and reps to tolerance; understanding noted  Reduced L ankle stability in L tandem; no LOB noted however this date  No complaints after tx other than B calf mm fatigue  Patient demonstrated fatigue post treatment and would benefit from continued PT      Plan: Continue per plan of care  Progress treatment as tolerated         Precautions: direct access      Re-eval Date:     Date       Visit Count 1 2      FOTO        Pain In See IE 1-2/10       Pain Out See IE 1-2/10                Manuals                                                                Neuro Re-Ed             Romberg    tandem     Floor   L/R   reviewed for HEP     Floor L   3x30" 0HHA  Close S            SLS             Star drill              Steamboats foam                                                    Ther Ex             Nustep vs [address] [facility]      Calf stretch           seated vs standing   reviewed  [address] [facility] L 1 5'     Semi sitting 4x30" L            Heel raises B 10x  reviewed  3x10 B from floor    eccentric L HR   3x10   With cues   From floor 0#            Ankle tband 4 way   Green   2x10 /5" ea            Seated towel scrunches   30x/5" cues                                                                Ther Activity                                       Gait Training                                       Modalities

## 2021-06-22 ENCOUNTER — TELEMEDICINE (OUTPATIENT)
Dept: BEHAVIORAL/MENTAL HEALTH CLINIC | Facility: CLINIC | Age: 31
End: 2021-06-22
Payer: COMMERCIAL

## 2021-06-22 DIAGNOSIS — F41.1 GAD (GENERALIZED ANXIETY DISORDER): ICD-10-CM

## 2021-06-22 DIAGNOSIS — F32.0 CURRENT MILD EPISODE OF MAJOR DEPRESSIVE DISORDER, UNSPECIFIED WHETHER RECURRENT (HCC): Primary | ICD-10-CM

## 2021-06-22 PROCEDURE — 90832 PSYTX W PT 30 MINUTES: CPT | Performed by: SOCIAL WORKER

## 2021-06-22 NOTE — PSYCH
Treatment Plan not completed within required time limits due to:  Andrysusana Jewell was having technology issues (video and sound not working on [employer] from her device) requiring session to be held by telephone and shortened  Will complete at next session

## 2021-06-22 NOTE — PSYCH
This note was not shared with the patient due to this is a psychotherapy note       Virtual Regular Visit      Assessment/Plan:    Problem List Items Addressed This Visit        Other    Depression - Primary    PEPE (generalized anxiety disorder)          Goals addressed in session: Goal 1          Reason for visit is   Chief Complaint   Patient presents with    Virtual Regular Visit        Encounter provider Tripp Carrington    Provider located at [address] [place] [id number] [phone]     The patient was identified by name and date of birth  Ahsan Angel was informed that this is a telemedicine visit and that the visit is being conducted through [employer] and patient was informed that this is a secure, HIPAA-compliant platform  She agrees to proceed     My office door was closed  No one else was in the room  She acknowledged consent and understanding of privacy and security of the video platform  The patient has agreed to participate and understands they can discontinue the visit at any time  Patient is aware this is a billable service  Subjective  Ahsan Angel is a 26 y o  female  Psychotherapy Provided: Individual Psychotherapy 35 minutes     Length of time in session: 35 minutes, follow up in 2 week    Goals addressed in session: Goal 1     Pain:      none    0    Current suicide risk : Low     Met with Archana Tim was having technology issues and was unable to get her camera and audio to work on [address] [place] [address] [employer]  Session was completed by phone  Felisha Tim states, "I've been feeling a little down and stuck "   She's been feeling unfulfilled in her day to day routine of caring for the baby  She explored working full-time, but financially does not feel it would be beneficial to put her daughter in      She is spending time with her friends several times a week; however, it disrupts the baby's schedule causing more stress  Her  has also been more depressed recently which she feels is affecting her mood the most    He is minimally communicating with Lola Matthews when he feels down and Lola Matthews often thinks he is mad at her  Session focused on Archana processing her emotions about coping with her 's unresolved grief (his father committed suicide), identifying patterns of destructive behavior affecting their relationship, and learning and implementing interpersonal skills  Archana processed her emotions about how her  did not spend Father's Day with her and their daughter; and instead spent it with his brother's at the [employer]  She voiced difficulty communicating with him because he often requires prompting and provides short answers  Archana processed her emotions about how this affects her in the home  Support was provided  Danielle Garcia expresses some apprehension about talking to her  about her concerns  She feels he will deflect and not make any changes for himself and the family  We discussed a pattern of his depression effecting her emotional wellness and causing strain on their marriage  We discussed effective communication and assertiveness skills using DBT  Lola Matthews will continue to implement wellness tools and share successes and challenges at next session  Homework to talk to her  about her concerns  Continue biweekly support        Mental status:  Appearance calm and cooperative    Mood depressed   Affect Mood congruent   Speech a normal rate and volume   Thought Processes coherent/organized and normal thought processes   Hallucinations no hallucinations present    Thought Content no delusions   Abnormal Thoughts no suicidal thoughts  and no homicidal thoughts    Orientation  oriented to person and place and time   Remote Memory short term memory intact and long term memory intact   Attention Span concentration intact   Intellect Appears to be of Average Intelligence   Fund of Knowledge displays adequate knowledge of current events   Insight fair   Judgement fair   Muscle Strength Unable to assess due to virtual session   Language no difficulty naming common objects   Pain none   Pain Scale 0       Behavioral Health Treatment Plan  Luke: Diagnosis and Treatment Plan explained to Aditi Juárez relates understanding diagnosis and is agreeable to Treatment Plan  Yes     I spent 35 minutes directly with the patient during this visit      VIRTUAL VISIT DISCLAIMER    Yu Hollingsworth acknowledges that she has consented to an online visit or consultation  She understands that the online visit is based solely on information provided by her, and that, in the absence of a face-to-face physical evaluation by the physician, the diagnosis she receives is both limited and provisional in terms of accuracy and completeness  This is not intended to replace a full medical face-to-face evaluation by the physician  Yu Hollingsworth understands and accepts these terms

## 2021-06-24 ENCOUNTER — TELEPHONE (OUTPATIENT)
Dept: FAMILY MEDICINE CLINIC | Facility: CLINIC | Age: 31
End: 2021-06-24

## 2021-06-24 NOTE — TELEPHONE ENCOUNTER
Patient called having Groin Pain, switching from Lum Cancer to Corky Lanagan  Was wondering if a ultrasound can be ordered prior to her appointment scheduled for 07/06/2021 to rule out a hernia?  If the order is placed please call Patient to let her know [phone], if order is not placed please call Patient and let her know it will be discussed at appointment

## 2021-06-24 NOTE — TELEPHONE ENCOUNTER
I would prefer to wait, that way we are ordering the most appropriate test for her after I evaluate her  Thanks!

## 2021-06-28 ENCOUNTER — OFFICE VISIT (OUTPATIENT)
Dept: PHYSICAL THERAPY | Facility: CLINIC | Age: 31
End: 2021-06-28
Payer: COMMERCIAL

## 2021-06-28 DIAGNOSIS — G56.00 CARPAL TUNNEL SYNDROME, UNSPECIFIED LATERALITY: ICD-10-CM

## 2021-06-28 DIAGNOSIS — M76.62 ACHILLES TENDINITIS OF LEFT LOWER EXTREMITY: Primary | ICD-10-CM

## 2021-06-28 PROCEDURE — 97110 THERAPEUTIC EXERCISES: CPT | Performed by: PHYSICAL MEDICINE & REHABILITATION

## 2021-06-28 PROCEDURE — 97112 NEUROMUSCULAR REEDUCATION: CPT | Performed by: PHYSICAL MEDICINE & REHABILITATION

## 2021-06-28 NOTE — PROGRESS NOTES
Daily Note     Today's date: 2021  Patient name: Opal Barillas  : 1990  MRN: [id number]  Referring provider: EMMY Canas  Dx:   Encounter Diagnosis     ICD-10-CM    1  Achilles tendinitis of left lower extremity  M76 62    2  Carpal tunnel syndrome, unspecified laterality  G56 00 Ambulatory referral to Physical Therapy                  Subjective: Pt notes her ankle has good/bad days  Was hurting more on Friday with additional pain in the bottom/ball of her foot, then it is "fine" today  No new sx  No known activities that consistently agitate sx  Objective: See treatment diary below      Assessment: Tolerated treatment well  Pt notes cont'd weakness in the calf mms with HR progression; denies increase in pain however notes increase in mm shakiness/fatigue  Reduced AROM with L single leg HR with difficulty/mm weakness noted; denied pain  Reduced L ankle stability in L SLS with tendency to collapse at midfoot and maite L heel  Min increase in heel soreness after session  Reviewed HEP for cont;d calf stretching, [place]'s tendon loading, and ankle strengthening to tolerance while monitoring sx; understanding noted  Patient demonstrated fatigue post treatment and would benefit from continued PT to address cont mobility and strength/stabilty deficits L foot/ankle  Plan: Continue per plan of care  Progress treatment as tolerated         Precautions: direct access      Re-eval Date:     Date      Visit Count 1 2 3     FOTO        Pain In See IE 1-210  0/10     Pain Out See IE 1-2/10 2-3/10                Manuals                                      Neuro Re-Ed        Romberg            tandem           Floor   L/R   reviewed for HEP           Floor L   3x30" 0HHA  Close S  Foam 3x30" EC   0HHA close S     Floor L /R  3x30"ea 0HHA  Close S   EC      SLS   Floor   EO L   2x30" o HHA  Close S      Star drill         Steamboats foam Ther Ex        Nustep vs [address] [facility]      Calf stretch           seated vs standing   reviewed  [address] [facility] L 1 5'     Semi sitting 4x30" L  [address] [facility] L 1 5'     Semi sitting 4x30" L      Heel raises B 10x  reviewed  3x10 B from floor    eccentric L HR   3x10   With cues   From floor 0#  3x10 B floor         2x10 L from the floor  Cues   0#         Single leg HR L   2x5-10 /5"     Ankle tband 4 way   Green   2x10 /5" ea  Green   2x10 /5" ea      Seated towel scrunches   30x/5" cues                                       Ther Activity                        Gait Training                        Modalities   deferred

## 2021-06-29 NOTE — TELEPHONE ENCOUNTER
Third attempt to contact pt without success  Left message for pt to call the office back  Also mailing letter to pt

## 2021-07-06 ENCOUNTER — OFFICE VISIT (OUTPATIENT)
Dept: FAMILY MEDICINE CLINIC | Facility: CLINIC | Age: 31
End: 2021-07-06
Payer: COMMERCIAL

## 2021-07-06 VITALS
BODY MASS INDEX: 20.66 KG/M2 | RESPIRATION RATE: 20 BRPM | TEMPERATURE: 98.6 F | HEART RATE: 70 BPM | SYSTOLIC BLOOD PRESSURE: 104 MMHG | DIASTOLIC BLOOD PRESSURE: 72 MMHG | WEIGHT: 124 LBS | OXYGEN SATURATION: 98 % | HEIGHT: 65 IN

## 2021-07-06 DIAGNOSIS — K59.00 CONSTIPATION, UNSPECIFIED CONSTIPATION TYPE: Primary | ICD-10-CM

## 2021-07-06 DIAGNOSIS — R10.31 RIGHT GROIN PAIN: ICD-10-CM

## 2021-07-06 PROCEDURE — 99214 OFFICE O/P EST MOD 30 MIN: CPT | Performed by: FAMILY MEDICINE

## 2021-07-06 NOTE — PROGRESS NOTES
Assessment/Plan:       Problem List Items Addressed This Visit     None      Visit Diagnoses     Constipation, unspecified constipation type    -  Primary    Relevant Orders    Ambulatory referral to Gastroenterology    Right groin pain        Relevant Orders    US groin/inguinal area            Subjective:      Patient ID: Pamela Cho is a 26 y o  female  HPI     After having her daughter who is 18 months ago, initially felt like pulled muscle, pain in right groin with certain motions, couple times triggered by holding something and going from sitting to standing, sometimes with turning  Pea-sized lump, painful to touch  Pain fairly constant since it started, most times not too painful  No fevers or chills  No abdominal pain  No prior injuries  No prior abdominal surgery, prior vaginal delivery, forceps delivery, pushed for 3 hours  Will start with ultrasound evaluation, if negative for hernia with pursue sports medicine/physical therapy eval      Constipation- On going issue, drinking tea supplement with senna, has to take laxatives days in a row to work  Ongoing problem whole life, past couple years worse, in past 2 years, no bowel movement without medication assistance, senna-s  Abdominal pain and lower back pain with no bowel movement  Last bowel movement Saturday, typically goes every 2-3 days with tea, has gone 10 days without bowel movement  Sometimes blood on tissue with constipation, painless, no blood mixed in  Sometimes diarrhea after several laxatives  Fair water intake, 3 glasses of water, tries to eat whole grain/fresh fruits  Takes probiotic  Counseled to increase water intake and start a daily fiber supplement, but given length and severity of symptoms advised GI consult       The following portions of the patient's history were reviewed and updated as appropriate: allergies, current medications, past family history, past medical history, past social history, past surgical history and problem list     Review of Systems   Constitutional: Negative for chills and fever  Respiratory: Negative for cough, chest tightness, shortness of breath and wheezing  Cardiovascular: Negative for chest pain and palpitations  Gastrointestinal: Positive for constipation  Negative for abdominal pain, blood in stool, diarrhea, nausea and vomiting  Musculoskeletal: Positive for arthralgias (Right hip pain)  Skin: Negative for pallor and rash  Neurological: Negative for dizziness, light-headedness and headaches  Objective:      /72 (BP Location: Left arm, Patient Position: Sitting, Cuff Size: Standard)   Pulse 70   Temp 98 6 °F (37 °C)   Resp 20   Ht 5' 5" (1 651 m)   Wt 56 2 kg (124 lb)   LMP 06/23/2021   SpO2 98%   BMI 20 63 kg/m²          Physical Exam  Vitals reviewed  Constitutional:       General: She is not in acute distress  Appearance: Normal appearance  She is not ill-appearing, toxic-appearing or diaphoretic  HENT:      Head: Normocephalic and atraumatic  Eyes:      General:         Right eye: No discharge  Left eye: No discharge  Extraocular Movements: Extraocular movements intact  Conjunctiva/sclera: Conjunctivae normal    Cardiovascular:      Pulses:           Femoral pulses are 2+ on the right side  Pulmonary:      Effort: Pulmonary effort is normal  No respiratory distress  Abdominal:      General: Bowel sounds are normal  There is no distension  Palpations: Abdomen is soft  There is no mass  Tenderness: There is no abdominal tenderness  There is no guarding or rebound  Comments: Fullness LLQ, no specific tenderness but generally uncomfortable to palpation   Musculoskeletal:         General: Tenderness present  No swelling, deformity or signs of injury  Cervical back: Normal range of motion and neck supple  No rigidity  Right hip: Tenderness present  No deformity, lacerations, bony tenderness or crepitus   Normal range of motion  Normal strength  Right lower leg: No edema  Left lower leg: No edema  Legs:    Skin:     General: Skin is warm  Coloration: Skin is not pale  Findings: No erythema or rash  Neurological:      Mental Status: She is alert and oriented to person, place, and time  Motor: No weakness     Psychiatric:         Mood and Affect: Mood normal          Behavior: Behavior normal            DO Edmar Echevarria [facility]

## 2021-07-07 ENCOUNTER — OFFICE VISIT (OUTPATIENT)
Dept: PHYSICAL THERAPY | Facility: CLINIC | Age: 31
End: 2021-07-07
Payer: COMMERCIAL

## 2021-07-07 DIAGNOSIS — G56.00 CARPAL TUNNEL SYNDROME, UNSPECIFIED LATERALITY: ICD-10-CM

## 2021-07-07 DIAGNOSIS — M76.62 ACHILLES TENDINITIS OF LEFT LOWER EXTREMITY: Primary | ICD-10-CM

## 2021-07-07 PROCEDURE — 97110 THERAPEUTIC EXERCISES: CPT | Performed by: PHYSICAL MEDICINE & REHABILITATION

## 2021-07-07 PROCEDURE — 97112 NEUROMUSCULAR REEDUCATION: CPT | Performed by: PHYSICAL MEDICINE & REHABILITATION

## 2021-07-07 NOTE — PROGRESS NOTES
Daily Note     Today's date: 2021  Patient name: Aubrey Lerner  : 1990  MRN: [id number]  Referring provider: EMMY Bueno  Dx:   Encounter Diagnosis     ICD-10-CM    1  Achilles tendinitis of left lower extremity  M76 62    2  Carpal tunnel syndrome, unspecified laterality  G56 00                   Subjective: Pt notes she was starting to feel better  Had less pain than before and was having more "better days" in terms of her foot/heel pain  Was a little sore with her HEP at times, but felt it was helping  2 days ago, her smoke alarm went off; she was jumping up and down to shut it off and had increased L heel pain since  Slowly improving  No new sx/complaints  Objective: See treatment diary below      Assessment: Tolerated treatment well  Pt reported reduced ankle/heel pain and stiffness after exercise program  Improving strength noted L calf with HR; able to complete more reps of single leg HR with greater ROM without increase in pain; continues to fatigue quickly in L calf with ankle/calf PREs  Improving eccentric crontrol with L eccentric calf raise; continues to fatigue quickly, but tolerating greater reps and from bottom step/larger ROM vs prior sessions  Improving L ankle stability with balance challenges with unstable platform; less LOB vs prior sessions  Weakness with ankle inversion > eversion with tbands  No complaints after tx with improvement in sx noted  Patient demonstrated fatigue post treatment and would benefit from continued PT  To address cont'd mm imbalances L foot/ankle leading to persistent pain/sx     Plan: Continue per plan of care  Progress treatment as tolerated         Precautions: direct access      Re-eval Date:     Date     Visit Count 1 2 3 4    FOTO        Pain In See IE 1-2/10  0/10 3/10    Pain Out See IE 1-2/10 2-3/10  1-2/10              Manuals                                     Neuro Re-Ed Romberg            tandem           Floor   L/R   reviewed for HEP           Floor L   3x30" 0HHA  Close S  Foam 3x30" EC   0HHA close S     Floor L /R  3x30"ea 0HHA  Close S   EC  Foam 3x30" EC   0HHA close S     Foam L /R  3x30"ea 0HHA  Close S   EO    SLS   Floor   EO L   2x30" o HHA  Close S  Floor   EO L   2x30" o HHA  Close S     Star drill         Steamboats foam                                Ther Ex        Nustep vs [address] [place] [address]      Calf stretch           seated vs standing   reviewed  [address] [place] [address] L 1 5'     Semi sitting 4x30" L  [address] [place] [address] L 1 5'     Semi sitting 4x30" L  [address] [place] [address] L 1 5'     standing 4x30" L/Haylee    Heel raises B 10x  reviewed  3x10 B from floor    eccentric L HR   3x10   With cues   From floor 0#  3x10 B floor         2x10 L from the floor  Cues   0#         Single leg HR L   2x5-10 /5" 3x10 B floor       eccentric L HR   3x10   With cues   From bottom step 0#       Single leg HR L   3x5 /5"    Ankle tband 4 way   Green   2x10 /5" ea  Green   2x10 /5" ea  Green   2x15 /5" ea     Seated towel scrunches   30x/5" cues   Reviewed  Cues with balance exercises for arch lift/pressing toes into floor         [address] [place] [address] L 1 10'                             Ther Activity                        Gait Training                        Modalities   deferred deferred

## 2021-07-14 ENCOUNTER — OFFICE VISIT (OUTPATIENT)
Dept: PHYSICAL THERAPY | Facility: CLINIC | Age: 31
End: 2021-07-14
Payer: COMMERCIAL

## 2021-07-14 DIAGNOSIS — M76.62 ACHILLES TENDINITIS OF LEFT LOWER EXTREMITY: Primary | ICD-10-CM

## 2021-07-14 PROCEDURE — 97140 MANUAL THERAPY 1/> REGIONS: CPT | Performed by: PHYSICAL MEDICINE & REHABILITATION

## 2021-07-14 PROCEDURE — 97110 THERAPEUTIC EXERCISES: CPT | Performed by: PHYSICAL MEDICINE & REHABILITATION

## 2021-07-14 NOTE — PROGRESS NOTES
Daily Note     Today's date: 2021  Patient name: Mela Hughes  : 1990  MRN: [id number]  Referring provider: EMMY Maradiaga  Dx:   Encounter Diagnosis     ICD-10-CM    1  Achilles tendinitis of left lower extremity  M76 62                   Subjective: ***      Objective: See treatment diary below      Assessment: Tolerated treatment {Tolerated treatment :0919970249}   Patient {assessment:5415491846}      Plan: {PLAN:5128365759}     Precautions: direct access      Re-eval Date:     Date     Visit Count 1 2 3 4    FOTO        Pain In See IE -2/10  0/10 3/10    Pain Out See IE -2/10 2-3/10  1-2/10              Manuals                                     Neuro Re-Ed        Romberg            tandem           Floor   L/R   reviewed for HEP           Floor L   3x30" 0HHA  Close S  Foam 3x30" EC   0HHA close S     Floor L /R  3x30"ea 0HHA  Close S   EC  Foam 3x30" EC   0HHA close S     Foam L /R  3x30"ea 0HHA  Close S   EO    SLS   Floor   EO L   2x30" o HHA  Close S  Floor   EO L   2x30" o HHA  Close S     Star drill         Steamboats foam                                Ther Ex        Nustep vs [address] [place] [address]      Calf stretch           seated vs standing   reviewed  [address] [place] [address] L 1 5'     Semi sitting 4x30" L  [address] [place] [address] L 1 5'     Semi sitting 4x30" L  [address] [place] [address] L 1 5'     standing 4x30" L/Haylee    Heel raises B 10x  reviewed  3x10 B from floor    eccentric L HR   3x10   With cues   From floor 0#  3x10 B floor         2x10 L from the floor  Cues   0#         Single leg HR L   2x5-10 /5" 3x10 B floor       eccentric L HR   3x10   With cues   From bottom step 0#       Single leg HR L   3x5 /5"    Ankle tband 4 way   Green   2x10 /5" ea  Green   2x10 /5" ea  Green   2x15 /5" ea     Seated towel scrunches   30x/5" cues   Reviewed  Cues with balance exercises for arch lift/pressing toes into floor         [address] [place] [address] L 1 10'                             Ther Activity                        Gait Training                        Modalities   deferred deferred

## 2021-07-14 NOTE — PROGRESS NOTES
PT Re-Evaluation     Today's date: 2021  Patient name: Chayito Hinkle  : 1990  MRN: [id number]  Referring provider: EMMY Pelayo  Dx:   Encounter Diagnosis     ICD-10-CM    1  Achilles tendinitis of left lower extremity  M76 62                   Assessment  Assessment details: Georgi Dozier has been compliant with attending PT and completing home exercise program since initial eval  She was reporting a steady improvement in sx until she recently exacerbated her pain when jumping 1-2 weeks ago  Despite this,   Georgi Dozier reports and demonstrates decreased pain, increased strength, increased ROM, improved flexibility and improved balance since initial eval, but is still limited compared to prior level of function  She continues with strength and stability deficits of L foot/ankle contributing to ongoing pain/sx  Georgi Dozier continues with above listed impairments and would benefit from additional skilled PT to address these deficits to return to prior level of function  Impairments: abnormal muscle firing, activity intolerance, impaired balance, impaired physical strength, lacks appropriate home exercise program and pain with function    Symptom irritability: lowUnderstanding of Dx/Px/POC: good   Prognosis: good    Goals  STGs to be achieved in 4 weeks:  1  Pt to demonstrate reduced subjective pain rating "at worst" by at least 2-3 points from Initial Eval in order to allow for reduced pain with ADLs and improved functional activity tolerance  -met  2  Pt to demonstrate increased AROM of L ankle DF by at least 5-10 degrees in order to allow for greater ease and independence with ADLs and functional mobility with improved foot/ankle mechanics and reduced pain  -met  3  Pt to demonstrate increased MMT of L ankle DF and inversion by at least 1/2-1 grade in order to improve safety and stability with ADLs and functional mobility  - progressing  4   Pt to demonstrate improved L calf strength as noted by ability to perform at least 10 consecutive single L HR from floor without UE assist with full ROM  LTGs to be achieved in 6-8 weeks:  1  Pt will be I with HEP in order to continue to improve quality of life and independence and reduce risk for re-injury  -progressing   2  Pt to demonstrate return to normal gait, standing, and stair climbing without pain and without limitations or restrictions  - progressing  3  Pt to demonstrate improved function as noted by achieving or exceeding predicted score on FOTO outcomes assessment tool  - progressing       Plan  Patient would benefit from: skilled physical therapy  Planned modality interventions: cryotherapy and thermotherapy: hydrocollator packs  Planned therapy interventions: manual therapy, motor coordination training, neuromuscular re-education, patient education, self care, strengthening, stretching, therapeutic exercise, home exercise program, balance and therapeutic activities  Frequency: 2x week  Duration in visits: 8  Duration in weeks: 4  Plan of Care beginning date: 2021  Plan of Care expiration date: 2021  Treatment plan discussed with: patient        Subjective Evaluation    History of Present Illness  Mechanism of injury: Pt notes overall, prior to about 2 weeks ago, her ankle sx seemed to be improving  Since jumping up/down to turn smoke alarm off, she has had a steady pain in L achilles tendon/heel "like it was before"  Overall is no worse  No new sx/complaints  Pain seems to be better after exercises  No consistent triggers; gets flared up often "for no reason"  Redness and swelling over L > R achilles tendon persists off/on  No worse with PT exercises  Admits to be not as consistent with exercises as she should be     Quality of life: good    Pain  Current pain ratin  At best pain ratin  At worst pain ratin  Location: L heel cord   Quality: dull ache and tight  Relieving factors: rest and heat  Aggravating factors: standing, walking and stair climbing  Progression: no change (improved before recent flare up, now no change in past 2 weeks)    Social Support  Steps to enter house: yes  Stairs in house: no   Lives in: [place]  Lives with: spouse and young children    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 )  Hand dominance: right      Diagnostic Tests  No diagnostic tests performed  Treatments  Previous treatment: medication  Current treatment: medication and physical therapy  Current treatment comments: OTC prn  Patient Goals  Patient goals for therapy: decreased pain  Patient's goals regarding treatment: learn exercises I can do at home  Objective     Observations   Left Ankle/Foot   Negative for deformity and edema  Additional Observation Details  After removing sneakers, note mild redness heel cord insertions L /R heel   No warmth or swelling noted  Will monitor         Tenderness   Left Ankle/Foot   Tenderness in the Achilles insertion  No tenderness in the posterior talofibular ligament  Additional Tenderness Details  Moderate ttp mid-distal Florecita's tendon on the L , > laterally vs medial attachment       Neurological Testing     Sensation     Ankle/Foot   Left Ankle/Foot   Intact: light touch    Right Ankle/Foot   Intact: light touch     Active Range of Motion   Left Ankle/Foot   Dorsiflexion (ke): 18 degrees   Plantar flexion: 55 degrees   Inversion: 35 degrees   Eversion: 10 degrees     Right Ankle/Foot   Normal active range of motion    Additional Active Range of Motion Details  Mild pain L heel cord with end range PF and inversion with pulling pain into calf - resolved  No pain/sx with L ankle AROM all planes      Joint Play   Left Ankle/Foot  Joints within functional limits are the talocrural joint       Strength/Myotome Testing     Left Ankle/Foot   Dorsiflexion: 4+  Plantar flexion: 4  Inversion: 4-  Eversion: 4 (4-4+/5)    Right Ankle/Foot   Dorsiflexion: 4+  Plantar flexion: 4+  Inversion: 4  Eversion: 4 (4-4+)    Additional Strength Details  No pain with MMT screening  Will cont to assess    Weakness noted L ankle vs R with singe leg HR; no pain  Limited L heel elevation AROM with single HR with difficulty     Tests   Left Ankle/Foot   Positive for calcaneal squeeze  Negative for anterior drawer, posterior drawer, Bautista, valgus tilt and varus tilt       Additional Tests Details  Normal mid/hindfoot alignment noted in WB without footwear B    R SLS floor WNL  L SLS floor with increased ankle/foot instability with 1-2x LOB over 15-20" with mild heel cord pain - no pain,continues with reduced L ankle stability vs R in SLS   Fair-good L foot/ankle mobility with B LE squatting in standing with mild pain L heel cord - resolved       Ambulation     Quality of Movement During Gait     Additional Quality of Movement During Gait Details  Grossly WNL             Re-eval Date: 8/13    Date 6/16 6/21 6/28 7/7 7/14   Visit Count 1 2 3 4 5   FOTO 6/16       Pain In See IE 1-2/10  0/10 3/10 3/10   Pain Out See IE 1-2/10 2-3/10  1-2/10 2/10             Manuals 6/16 6/21 6/28 7/7 7/14        GIASTM instrument #4 L calf with sweeping/fanning to tolerance, gentle brushing/framing L heel cord to tolerance pt prone   10'         Pt signed GIASTM consent form without questions/concerns 7/14                   Neuro Re-Ed        Romberg            tandem           Floor   L/R   reviewed for HEP           Floor L   3x30" 0HHA  Close S  Foam 3x30" EC   0HHA close S     Floor L /R  3x30"ea 0HHA  Close S   EC  Foam 3x30" EC   0HHA close S     Foam L /R  3x30"ea 0HHA  Close S   EO    SLS   Floor   EO L   2x30" o HHA  Close S  Floor   EO L   2x30" o HHA  Close S     Star drill         Steamboats foam                                Ther Ex        Nustep vs [address]      Calf stretch           seated vs standing   reviewed  [facility] L 1 5'     Semi sitting 4x30" L  [facility] L 1 5'     Semi sitting 4x30" L  [address] L 1 5'     standing 4x30" L/Haylee [address] L1  5' Reviewed    Heel raises B 10x  reviewed  3x10 B from floor    eccentric L HR   3x10   With cues   From floor 0#  3x10 B floor         2x10 L from the floor  Cues   0#         Single leg HR L   2x5-10 /5" 3x10 B floor       eccentric L HR   3x10   With cues   From bottom step 0#       Single leg HR L   3x5 /5" 3x10 B floor       eccentric L HR   3x10   With cues   From bottom step 0#       Single leg HR L   3-4x5 /5"   Ankle tband 4 way   Green   2x10 /5" ea  Green   2x10 /5" ea  Green   2x15 /5" ea  Green   2x15 /5" ea    Seated towel scrunches   30x/5" cues   Reviewed  Cues with balance exercises for arch lift/pressing toes into floor                                      Ther Activity                        Gait Training                        Modalities   deferred deferred deferred

## 2021-07-15 ENCOUNTER — TELEMEDICINE (OUTPATIENT)
Dept: BEHAVIORAL/MENTAL HEALTH CLINIC | Facility: CLINIC | Age: 31
End: 2021-07-15
Payer: COMMERCIAL

## 2021-07-15 DIAGNOSIS — F32.0 CURRENT MILD EPISODE OF MAJOR DEPRESSIVE DISORDER, UNSPECIFIED WHETHER RECURRENT (HCC): Primary | ICD-10-CM

## 2021-07-15 DIAGNOSIS — F41.1 GAD (GENERALIZED ANXIETY DISORDER): ICD-10-CM

## 2021-07-15 PROCEDURE — 90834 PSYTX W PT 45 MINUTES: CPT | Performed by: SOCIAL WORKER

## 2021-07-15 NOTE — PSYCH
Treatment Plan not completed within required time limits due to:  Edin Lozano presented with emotional concerns requiring clinical intervention  Will complete tx plan at next session

## 2021-07-15 NOTE — PSYCH
This note was not shared with the patient due to this is a psychotherapy note     Virtual Regular Visit    Verification of patient location:    Patient is currently located in the state [facility]  Patient is currently located in a state in which I am licensed      Assessment/Plan:    Problem List Items Addressed This Visit        Other    Depression - Primary    PEPE (generalized anxiety disorder)          Goals addressed in session: Goal 1          Reason for visit is   Chief Complaint   Patient presents with    Virtual Regular Visit        Encounter provider [facility]    Provider located at [address] [place] [id number] [phone]      Recent Visits  No visits were found meeting these conditions  Showing recent visits within past 7 days and meeting all other requirements  Future Appointments  No visits were found meeting these conditions  Showing future appointments within next 150 days and meeting all other requirements       The patient was identified by name and date of birth  Antonio Maxwell was informed that this is a telemedicine visit and that the visit is being conducted throughMicrosoft Teams and patient was informed that this is a secure, HIPAA-compliant platform  She agrees to proceed     My office door was closed  No one else was in the room  She acknowledged consent and understanding of privacy and security of the video platform  The patient has agreed to participate and understands they can discontinue the visit at any time  Patient is aware this is a billable service  Subjective  Antonio Maxwell is a 26 y o  female   Psychotherapy Provided: Individual Psychotherapy 50 minutes     Length of time in session: 50 minutes, follow up in 2 week    Goals addressed in session: Goal 1     Pain:      none    0    Current suicide risk : Low     Met with Archana Morfin reports struggling with a recent increase in anxiety as evidenced by racing thoughts and difficulty letting things go  She voices that she found minimal relief with anxiety-reduction tools and overtime, the symptoms decreased on their own  Session focused on Archana processing her recent increase in anxiety and worry and working to develop healthier cognitive patterns and beliefs of self to alleviate symptoms  Archana processed her emotions about the triggers of her recent increase in symptoms being 1) 4th of July fireworks distressing baby and dog  2) ongoing stress at work  3) having the neighbor begin to watch her baby 1 day a week instead of her mother  3) her marriage  Throughout session, Chandan Voss made negative statements about herself in which we worked to replace using CBT tools; specifically helicopter view and WISE mind  This provider encouraged her to focus on her use of problem solving strategies including 1) her mother is a stressor for her and her , and having the neighbor watch the baby 1 day a week will lessen the amount of time her mother is staying at her home overnight, which in turn can improve their marriage  2) she scheduled her  for OP therapy, he's begun going weekly, and she's noticing improvement in his mood  At end of session, Chandan Voss voiced feeling more confident in herself and her recent actions for her wellness  Chandan Voss will continue to implement wellness tools learned in session; share successes and challenges  Continue biweekly support         Mental status:  Appearance calm and cooperative , adequate hygiene and grooming and good eye contact    Mood Slightly anxious, slightly depressed   Affect Mood congruent   Speech a normal rate and volume   Thought Processes coherent/organized and normal thought processes   Hallucinations no hallucinations present    Thought Content no delusions   Abnormal Thoughts no suicidal thoughts  and no homicidal thoughts , cognitive distortions, negative thinking   Orientation  oriented to person and place and time   Remote Memory short term memory intact and long term memory intact   Attention Span concentration intact   Intellect Appears to be of Average Intelligence   Fund of Knowledge displays adequate knowledge of current events   Insight fair   Judgement fair   Muscle Strength Unable to assess due to virtual session   Language no difficulty naming common objects   Pain none   Pain Scale 0     [address]: Diagnosis and Treatment Plan explained to Nuria De La O relates understanding diagnosis and is agreeable to Treatment Plan  Yes       I spent 50 minutes directly with the patient during this visit    VIRTUAL VISIT DISCLAIMER    Austin Collins verbally agrees to participate in [employer]  Pt is aware that [employer] could be limited without vital signs or the ability to perform a full hands-on physical Stacey Tobar understands she or the provider may request at any time to terminate the video visit and request the patient to seek care or treatment in person

## 2021-07-21 ENCOUNTER — OFFICE VISIT (OUTPATIENT)
Dept: PHYSICAL THERAPY | Facility: CLINIC | Age: 31
End: 2021-07-21
Payer: COMMERCIAL

## 2021-07-21 DIAGNOSIS — M76.62 ACHILLES TENDINITIS OF LEFT LOWER EXTREMITY: Primary | ICD-10-CM

## 2021-07-21 DIAGNOSIS — G56.00 CARPAL TUNNEL SYNDROME, UNSPECIFIED LATERALITY: ICD-10-CM

## 2021-07-21 PROCEDURE — 97110 THERAPEUTIC EXERCISES: CPT | Performed by: PHYSICAL MEDICINE & REHABILITATION

## 2021-07-21 PROCEDURE — 97140 MANUAL THERAPY 1/> REGIONS: CPT | Performed by: PHYSICAL MEDICINE & REHABILITATION

## 2021-07-21 NOTE — PROGRESS NOTES
Daily Note     Today's date: 2021  Patient name: Pamela Cho  : 1990  MRN: [id number]  Referring provider: EMMY Parada  Dx:   Encounter Diagnosis     ICD-10-CM    1  Achilles tendinitis of left lower extremity  M76 62    2  Carpal tunnel syndrome, unspecified laterality  G56 00                   Subjective: Pt notes overall some improvements in L heel pain since SOC  Doesn't seem to get as bad as often  Able to tolerate more activity vs SOC  Felt "pretty good" after last tx  Tweaked her heel and it hurt x 2 days but then went back to baseline  Doesn't seem as sensitive to touch L heel  Improving strength L ankle  Objective: See treatment diary below      Assessment: Tolerated treatment well  Able to tolerate increased pressure with GIASTM L heel cord with reduced sensitivity vs last tx  Progressing with CKC heel cord loading  Cont with weakness L calf vs R however improving vs prior treatments  Improving strength L ankle with PREs; increased resistance tolerated  No new sx/complaints  Reviewed HEP with emphasi  on stretching and heel cord loading to tolerance; understanding noted  No complaints after tx  Patient demonstrated fatigue post treatment and would benefit from continued PT to cont to address strength and stability deficits L foot/ankle contributing to cont'd pain/sx  Plan: Continue per plan of care  Progress treatment as tolerated         Re-eval Date:     Date        Visit Count 6       FOTO        Pain In 2/10       Pain Out 2/10                 Manuals         GIASTM instrument #4 L calf with sweeping/fanning to tolerance, gentle brushing/framing L heel cord to tolerance pt prone   15' total                                Neuro Re-Ed        Romberg            tandem   Reviewed HEP        Reviewed HEP       SLS Reviewed HEP       Star drill         Steamboats foam                                Ther Ex        Nustep vs [facility]      Calf stretch   [facility] L1 6'      Seated   4x30"                  Heel raises B B bottom step  10x  From floor B  20x    Single leg HR  5x ea bottom step  Single leg from floor 10x ea R/L    eccentric L HR   15x   With cues   From bottom step 0#        Ankle tband 4 way  Green   2x15 /5" ea       Seated towel scrunches                                          Ther Activity                        Gait Training                        Modalities deferred

## 2021-07-23 ENCOUNTER — OFFICE VISIT (OUTPATIENT)
Dept: PSYCHIATRY | Facility: CLINIC | Age: 31
End: 2021-07-23
Payer: COMMERCIAL

## 2021-07-23 DIAGNOSIS — Z34.93 THIRD TRIMESTER PREGNANCY: ICD-10-CM

## 2021-07-23 DIAGNOSIS — F41.9 ANXIETY: ICD-10-CM

## 2021-07-23 DIAGNOSIS — F32.0 CURRENT MILD EPISODE OF MAJOR DEPRESSIVE DISORDER, UNSPECIFIED WHETHER RECURRENT (HCC): Primary | ICD-10-CM

## 2021-07-23 DIAGNOSIS — F41.1 GAD (GENERALIZED ANXIETY DISORDER): ICD-10-CM

## 2021-07-23 PROCEDURE — 99213 OFFICE O/P EST LOW 20 MIN: CPT | Performed by: PHYSICIAN ASSISTANT

## 2021-07-23 RX ORDER — SERTRALINE HYDROCHLORIDE 25 MG/1
25 TABLET, FILM COATED ORAL DAILY
Qty: 90 TABLET | Refills: 0 | Status: SHIPPED | OUTPATIENT
Start: 2021-07-23 | End: 2021-09-29 | Stop reason: SDUPTHER

## 2021-07-23 RX ORDER — SERTRALINE HYDROCHLORIDE 100 MG/1
50 TABLET, FILM COATED ORAL DAILY
Refills: 0 | COMMUNITY
Start: 2021-07-23 | End: 2021-09-29 | Stop reason: DRUGHIGH

## 2021-07-23 RX ORDER — BUSPIRONE HYDROCHLORIDE 5 MG/1
5 TABLET ORAL 3 TIMES DAILY
Qty: 90 TABLET | Refills: 1 | Status: SHIPPED | OUTPATIENT
Start: 2021-07-23 | End: 2021-09-29 | Stop reason: SDUPTHER
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
## 2021-07-28 ENCOUNTER — APPOINTMENT (OUTPATIENT)
Dept: PHYSICAL THERAPY | Facility: CLINIC | Age: 31
End: 2021-07-28
Payer: COMMERCIAL

## 2021-07-29 ENCOUNTER — TELEMEDICINE (OUTPATIENT)
Dept: BEHAVIORAL/MENTAL HEALTH CLINIC | Facility: CLINIC | Age: 31
End: 2021-07-29
Payer: COMMERCIAL

## 2021-07-29 DIAGNOSIS — F41.1 GAD (GENERALIZED ANXIETY DISORDER): ICD-10-CM

## 2021-07-29 DIAGNOSIS — F33.0 MILD EPISODE OF RECURRENT MAJOR DEPRESSIVE DISORDER (HCC): Primary | ICD-10-CM

## 2021-07-29 PROCEDURE — 90834 PSYTX W PT 45 MINUTES: CPT | Performed by: SOCIAL WORKER

## 2021-07-29 NOTE — BH TREATMENT PLAN
Sandra Lucia  1990       Date of Initial Treatment Plan: 1/22/21  Date of Current Treatment Plan: 07/29/21    Treatment Plan Number 2    Strengths/Personal Resources for Self Care: loves family, employed, good at delegating, enjoys organizing houses, good mom    Diagnosis:   1  Mild episode of recurrent major depressive disorder ([facility] [address] )     2  PEPE (generalized anxiety disorder)         Area of Needs: reduce anxiety and worry, improve coping skills      Long Term Goal 1: "to be able to start managing my anxiety without relying on medications"    Target Date: 12/29/21  Completion Date: TBD         Short Term Objectives for Goal 1: see objectives below  1 1  Rigoberto Camarena will continue to describe her worry and anxiety and how it impacts daily functioning  1 2  Rigoberto Camarena will share examples of how she is practicing acceptance of the relationship with her mother and describe the challenges in doing so  1 3  Rigoberto Camarena will continue to set limits/boundaries and implement assertiveness skills to improve overall mood  1 3  Rigoberto Camarena will remain compliant with prescribed psychiatric medication  1 4  Rigoberto Camarena will learn and engage in mindfulness/relaxation techniques daily to improve symptoms of anxiety  Share successes and challenges  1 5  Rigoberto Camarena will learn and implement 2 distress tolerance skills to reduce emotional outbursts when feeling overwhelmed  Share successes and challenges  GOAL 1: Modality: Individual 1-2x per month   Completion Date TBD, Medication Management and The person(s) responsible for carrying out the plan is  Sandra Lucia, Zoë Araujo, and Bj Crockett   Modalities: CBT, insight oriented therapy, psychoeducation, mindfulness, problem solving skills, communication skills, stress management skills, assertiveness skills, DBT skills,, emotional needs meeting           Behavioral Health Treatment Plan [facility]: Diagnosis and Treatment Plan explained to Mollie Saint relates understanding diagnosis and is agreeable to Treatment Plan  Client Comments : Please share your thoughts, feelings, need and/or experiences regarding your treatment plan:       I, Tyrone Alfredo agree to have Jignesh Lange sign for me due to a virtual session/COVID-19

## 2021-07-29 NOTE — PSYCH
This note was not shared with the patient due to this is a psychotherapy note     Virtual Regular Visit    Verification of patient location:    Patient is located in the following state in which I hold an active license [place]      Assessment/Plan:    Problem List Items Addressed This Visit        Other    Depression - Primary    PEPE (generalized anxiety disorder)          Goals addressed in session: Goal 1          Reason for visit is   Chief Complaint   Patient presents with    Virtual Regular Visit        Encounter provider [facility]    Provider located at [address] [place] [address] [phone]    The patient was identified by name and date of birth  Tyrone Alfredo was informed that this is a telemedicine visit and that the visit is being conducted throughMicrosoft Teams and patient was informed that this is a secure, HIPAA-compliant platform  She agrees to proceed     My office door was closed  No one else was in the room  She acknowledged consent and understanding of privacy and security of the video platform  The patient has agreed to participate and understands they can discontinue the visit at any time  Patient is aware this is a billable service  Subjective  Tyrone Alfredo is a 26 y o  female   Psychotherapy Provided: Individual Psychotherapy 40 minutes     Length of time in session: 45 minutes, follow up in 2 week    Goals addressed in session: Goal 1     Pain:      none    0    Current suicide risk : Low       Met with Sarah Suellen Kayser states, "I'm doing good "  Suellen Kayser took her daughter to [place] this morning  Her  continues to attend therapy weekly and his behaviors have begun to change around the home in a positive way  In turn, this is improving their relationship    He is helping out more when he gets home from work and he left the house alone with their 21 month old for the first time last week  Session focused on reviewing her progress in therapy, discussing how she is coping, and how she is remaining clear about using her coping skills  We discussed her progress towards goals and made revisions as appropriate  Nicci Steele described her ongoing worry and anxiety in session making it difficult for her to sleep and make decisions  We discussed triggers to her anxiety and worry; including her 's insurance large deductible for therapy and work hours changing due to a coworker being out  She processed her emotions about these stressors  We discussed problem solving strategies to reduce worry  We discussed creating a worry box to reduce symptoms  She is exploring changing jobs; however, her  is pushing for her to stay home  We discussed how staying home with limited adult interaction has effected her mental health in the past     Nicci Steele is encouraged to implement wellness tools learned in session; share successes and challenges  Continue biweekly support       Mental status:  Appearance calm and cooperative , adequate hygiene and grooming and good eye contact    Mood Slightly anxious   Affect Mood congruent   Speech a normal rate and volume   Thought Processes coherent/organized and normal thought processes   Hallucinations no hallucinations present    Thought Content no delusions   Abnormal Thoughts no suicidal thoughts  and no homicidal thoughts    Orientation  oriented to person and place and time   Remote Memory short term memory intact and long term memory intact   Attention Span concentration intact   Intellect Appears to be of Average Intelligence   Fund of Knowledge displays adequate knowledge of current events   Insight fair   Judgement fair   Muscle Strength Unable to assess due to virtual session   Language no difficulty naming common objects   Pain none   Pain Scale 0         [address]: Diagnosis and Treatment Plan explained to Nicci Steele, Cinthya Call relates understanding diagnosis and is agreeable to Treatment Plan  Yes     I spent 45 minutes directly with the patient during this visit    VIRTUAL VISIT DISCLAIMER    Charbel Colón verbally agrees to participate in [employer]  Pt is aware that [employer] could be limited without vital signs or the ability to perform a full hands-on physical Aguirre Dark understands she or the provider may request at any time to terminate the video visit and request the patient to seek care or treatment in person

## 2021-08-04 ENCOUNTER — OFFICE VISIT (OUTPATIENT)
Dept: PHYSICAL THERAPY | Facility: CLINIC | Age: 31
End: 2021-08-04
Payer: COMMERCIAL

## 2021-08-04 DIAGNOSIS — M76.62 ACHILLES TENDINITIS OF LEFT LOWER EXTREMITY: Primary | ICD-10-CM

## 2021-08-04 PROCEDURE — 97140 MANUAL THERAPY 1/> REGIONS: CPT | Performed by: PHYSICAL MEDICINE & REHABILITATION

## 2021-08-04 PROCEDURE — 97110 THERAPEUTIC EXERCISES: CPT | Performed by: PHYSICAL MEDICINE & REHABILITATION

## 2021-08-04 NOTE — PROGRESS NOTES
Daily Note     Today's date: 2021  Patient name: Rona Brice  : 1990  MRN: [id number]  Referring provider: EMMY Negron  Dx:   Encounter Diagnosis     ICD-10-CM    1  Achilles tendinitis of left lower extremity  M76 62                   Subjective: Pt notes overall her ankle pain is improving  Has had some pain arch of R foot the past few days; no known trigger  No new sx/complaints otherwise  Notes some soreness the day after work or after doing her exercises  Objective: See treatment diary below      Assessment: Tolerated treatment well  Overall improvements reported in pain and function per pt  Demonstrates improving strength L ankle with PREs  Improved L ankle strength with Hrs; increased AROM and improved reps tolerated  Reduced sensitivity L heel cord with GIASTM  No complaints after tx  Continues with weakness L ankle PF; fatigues quickly  Cont'd insertional soreness L achilles, > laterally  Reviewed HEP  Patient demonstrated fatigue post treatment and would benefit from continued PT      Plan: Continue per plan of care  Progress treatment as tolerated         Re-eval Date:     Date       Visit Count 6 7      FOTO        Pain In 2/10 1/10      Pain Out 2/10 1/10                Manuals        GIASTM instrument #4 L calf with sweeping/fanning to tolerance, gentle brushing/framing L heel cord to tolerance pt prone   15' total  GIASTM instrument #4 L calf with sweeping/fanning to tolerance, gentle brushing/framing L heel cord to tolerance pt prone   15' total                               Neuro Re-Ed        Romberg            tandem   Reviewed HEP        Reviewed HEP       SLS Reviewed HEP       Star drill         Steamboats foam                                Ther Ex        Nustep vs [facility]      Calf stretch   [facility] L1  6'      Seated   4x30"            [facility] L3  6'      4x30"      Heel raises B B bottom step  10x  From floor B  20x    Single leg HR  5x ea bottom step  Single leg from floor 10x ea R/L    eccentric L HR   15x   With cues   From bottom step 0#    From floor B  30x        Single leg from floor 20x ea R/L            eccentric L HR   30x   With cues   From bottom step 0#       Ankle tband 4 way  Green   2x15 /5" ea Green   2x15 /5" ea      Seated towel scrunches                                          Ther Activity                        Gait Training                        Modalities deferred deferred

## 2021-08-11 ENCOUNTER — OFFICE VISIT (OUTPATIENT)
Dept: PHYSICAL THERAPY | Facility: CLINIC | Age: 31
End: 2021-08-11
Payer: COMMERCIAL

## 2021-08-11 DIAGNOSIS — M76.62 ACHILLES TENDINITIS OF LEFT LOWER EXTREMITY: Primary | ICD-10-CM

## 2021-08-11 PROCEDURE — 97140 MANUAL THERAPY 1/> REGIONS: CPT | Performed by: PHYSICAL MEDICINE & REHABILITATION

## 2021-08-11 PROCEDURE — 97110 THERAPEUTIC EXERCISES: CPT | Performed by: PHYSICAL MEDICINE & REHABILITATION

## 2021-08-11 NOTE — PROGRESS NOTES
Daily Note     Today's date: 2021  Patient name: Bonifacio Brunson  : 1990  MRN: [id number]  Referring provider: EMMY Crawford  Dx:   Encounter Diagnosis     ICD-10-CM    1  Achilles tendinitis of left lower extremity  M76 62                   Subjective: Pt notes her L ankle has been feeling much better overall  Hasn't really been bothering her as of late  Notes pain arch of R foot; comes/goes; worse with walking barefoot or wearing shoes without arch supports  No new sx/complaints  Pleased with her progress overall  Notes her L heel is no longer sensitive to light touch/pressure  Objective: See treatment diary below      Assessment: Tolerated treatment well  Pt demonstrates overall excellent progress with L heel cord pain  No longer sensitive with palpation or pressure over L heel cord/posterior calcaneus with palpation or GIASTM  Full ROM noted L foot/ankle without pain/sx  Good strength noted t/o L ankle without pain  I with HEP and self stretching program  R foot demonstrates possible signs/sx consistent with R plantar fascitis; restrictions noted mid r PF towards attachment R medial calcaneus; reduced pain and sensitivity with STM  Educated pt in self calf/PF stretching, self STM R PF with roll/frozen water bottle etc, R ankle strengthening, and to avoid prolonged periods WB without arch support; understanding noted; she will cont to monitor sx  Pt wishes to trial d/c to HEP at this time as she notes I with HEP and min-no sx/compliants  FOTO demonstrates significant improvement  Reviewed HEP without questions/concerns noted  Plan: Trial transition to HEP  Pt to call /contact PT if any worsening in pain/sx should arise or if she has any questions/concerns        Re-eval Date:     Date      Visit Count 6 7 8     FOTO        Pain In 2/10 1/10 0/10     Pain Out 2/10 1/10 0/10               Manuals       GIASTM instrument #4 L calf with sweeping/fanning to tolerance, gentle brushing/framing L heel cord to tolerance pt prone   15' total  GIASTM instrument #4 L calf with sweeping/fanning to tolerance, gentle brushing/framing L heel cord to tolerance pt prone   15' total  GIASTM instrument #5 L calf with sweeping/fanning to tolerance, gentle brushing/framing L heel cord to tolerance pt prone ,   R PF STM pt prone to tolerance   20' total                              Neuro Re-Ed        Romberg            tandem   Reviewed HEP        Reviewed HEP       SLS Reviewed HEP       Star drill         Steamboats foam                                Ther Ex        Nustep vs [address] [place] [address]      Calf stretch   [address] L1  6'      Seated   4x30"            [address] [place] [address] L3  6'      4x30" [address] [place] [address] L3  6'      reviewed     Heel raises B B bottom step  10x  From floor B  20x    Single leg HR  5x ea bottom step  Single leg from floor 10x ea R/L    eccentric L HR   15x   With cues   From bottom step 0#    From floor B  30x        Single leg from floor 20x ea R/L            eccentric L HR   30x   With cues   From bottom step 0#    Reviewed          Reviewed                Reviewed         Ankle tband 4 way  Green   2x15 /5" ea Green   2x15 /5" ea Reviewed       Seated towel scrunches                                          Ther Activity                        Gait Training                        Modalities deferred deferred Deferred

## 2021-08-26 ENCOUNTER — TELEMEDICINE (OUTPATIENT)
Dept: BEHAVIORAL/MENTAL HEALTH CLINIC | Facility: CLINIC | Age: 31
End: 2021-08-26
Payer: COMMERCIAL

## 2021-08-26 DIAGNOSIS — F33.0 MILD EPISODE OF RECURRENT MAJOR DEPRESSIVE DISORDER (HCC): Primary | ICD-10-CM

## 2021-08-26 DIAGNOSIS — F41.1 GAD (GENERALIZED ANXIETY DISORDER): ICD-10-CM

## 2021-08-26 PROCEDURE — 90834 PSYTX W PT 45 MINUTES: CPT | Performed by: SOCIAL WORKER

## 2021-08-29 NOTE — PSYCH
This note was not shared with the patient due to this is a psychotherapy note      Virtual Regular Visit    Verification of patient location:    Patient is located in the following state in which I hold an active license       Assessment/Plan:    Problem List Items Addressed This Visit        Other    Depression - Primary    PEPE (generalized anxiety disorder)          Goals addressed in session:           Reason for visit is   Chief Complaint   Patient presents with    Virtual Regular Visit        Encounter provider [facility]    Provider located at [address] [place] [id number] [phone]      Recent Visits  Date Type Provider Dept   08/26/21 2101 [facility] Therapist Kathi   Showing recent visits within past 7 days and meeting all other requirements  Future Appointments  No visits were found meeting these conditions  Showing future appointments within next 150 days and meeting all other requirements       The patient was identified by name and date of birth  Chandler Caceres was informed that this is a telemedicine visit and that the visit is being conducted through  She acknowledged consent and understanding of privacy and security of the video platform  The patient has agreed to participate and understands they can discontinue the visit at any time  Patient is aware this is a billable service  Subjective  Chandler Caceres is a 33 y o  female    This note was not shared with the patient due to this is a psychotherapy note      Psychotherapy Provided: Individual Psychotherapy 45 minutes     Length of time in session: 45 minutes, follow up in 2 week    Goals addressed in session: Goal 1     Pain:      none    0    Current suicide risk : Low       Met with Archana    She states, "I've been really stressed out, the work saga continues "  Qian Cardenas has been suspended from her job for over 14 days after a grievance was filed against her by a coworker and visitor due to her attitude  Archana received her final warning by her employer  She will be returning on Monday  She is exploring other job opportunities and had 2 interviews  Her  continues to go to individual therapy and their relationship continues to improve  According to Gm Hart, he's been supportive to her with her work stress  Gm Hart is also becoming closer to her neighbor, and is beginning to call her a friend  Session focused on discussing the incident that occurred in the workplace leading to her suspension  Initially, Archana vented about her problems in the workplace  Archana processed her emotions about how she feels her work environment effects her mood; stating, "no one likes me "   She voices a lack of self confidence after several write ups; stating she "feels like a failure", "not good enough", expressed she always thought she was a good worker  Gm Hailey was encouraged to look back at the incidents that led to her suspension and reflect on her own actions/behaviors and what she could have done differently  Gm Hart takes accountability for having an attitude with others; however, minimizes it, focusing instead on what other employees/vistors do and get away stating, "but they were rude to me, I don't think it was a big deal "   Throughout session, Gm Hart was encouraged to look at the situations and people involved from different perspectives to develop healthier cognitive patterns and beliefs of self and others  Gm Hart will continue to implement wellness tools; share successes and challenges  Continue biweekly support       PHQ-9 Depression Screening    PHQ-9:   Frequency of the following problems over the past two weeks:      Little interest or pleasure in doing things: 0 - not at all  Feeling down, depressed, or hopeless: 1 - several days  Trouble falling or staying asleep, or sleeping too much: 1 - several days  Feeling tired or having little energy: 0 - not at all  Poor appetite or overeatin - not at all  Feeling bad about yourself - or that you are a failure or have let yourself or your family down: 1 - several days  Trouble concentrating on things, such as reading the newspaper or watching television: 0 - not at all  Moving or speaking so slowly that other people could have noticed  Or the opposite - being so fidgety or restless that you have been moving around a lot more than usual: 0 - not at all  Thoughts that you would be better off dead, or of hurting yourself in some way: 0 - not at all  PHQ-2 Score: 1  PHQ-9 Score: 3       PEPE-7 Flowsheet Screening      Most Recent Value   Over the last 2 weeks, how often have you been bothered by any of the following problems?    Feeling nervous, anxious, or on edge  2   Not being able to stop or control worrying  1   Worrying too much about different things  1   Trouble relaxing  1   Being so restless that it is hard to sit still  0   Becoming easily annoyed or irritable  0   Feeling afraid as if something awful might happen  1   PEPE-7 Total Score  6        Mental status:  Appearance calm and cooperative , adequate hygiene and grooming and good eye contact    Mood anxious   Affect constricted   Speech a normal rate and volume   Thought Processes coherent/organized and normal thought processes   Hallucinations no hallucinations present    Thought Content no delusions   Abnormal Thoughts no suicidal thoughts  and no homicidal thoughts    Orientation  oriented to person and place and time   Remote Memory short term memory intact and long term memory intact   Attention Span concentration intact   Intellect Appears to be of Average Intelligence   Fund of Knowledge displays adequate knowledge of current events   Insight fair   Judgement fair   Muscle Strength Unable to assess due to virtual session   Language no difficulty naming common objects   Pain none   Pain Scale 0           Behavioral Health Treatment Plan [facility]: Diagnosis and Treatment Plan explained to Aixa Escobar relates understanding diagnosis and is agreeable to Treatment Plan  Yes         VIRTUAL VISIT DISCLAIMER    Eladio Mac verbally agrees to participate in [employer]  Pt is aware that [employer] could be limited without vital signs or the ability to perform a full hands-on physical Marilu Blank understands she or the provider may request at any time to terminate the video visit and request the patient to seek care or treatment in person

## 2021-09-02 NOTE — PROGRESS NOTES
PT Discharge    Today's date: 2021  Patient name: Kiesha Scott  : 1990  MRN: [id number]  Referring provider: EMMY White  Dx:   Encounter Diagnosis     ICD-10-CM    1  Achilles tendinitis of left lower extremity  M76 [id number]        Start Time: 1700  Stop Time: 1735  Total time in clinic (min): 35 minutes    Assessment  Assessment details: Update - Pt reported overall improvement in sx and independence with HEP  She felt pleased with her progress and that she was ready to d/c to HEP  Last attended session was on 21; see note for details  HEP was updated and reviewed; pt with no questions/concerns end of tx  Pt will be d/c to HEP  Pooja Medina has been compliant with attending PT and completing home exercise program since initial eval  She was reporting a steady improvement in sx until she recently exacerbated her pain when jumping 1-2 weeks ago  Despite this,   Pooja Medina reports and demonstrates decreased pain, increased strength, increased ROM, improved flexibility and improved balance since initial eval, but is still limited compared to prior level of function  She continues with strength and stability deficits of L foot/ankle contributing to ongoing pain/sx  Pooja Medina continues with above listed impairments and would benefit from additional skilled PT to address these deficits to return to prior level of function  Impairments: abnormal muscle firing, activity intolerance, impaired balance, impaired physical strength, lacks appropriate home exercise program and pain with function    Symptom irritability: lowUnderstanding of Dx/Px/POC: good   Prognosis: good    Goals  STGs to be achieved in 4 weeks:  1  Pt to demonstrate reduced subjective pain rating "at worst" by at least 2-3 points from Initial Eval in order to allow for reduced pain with ADLs and improved functional activity tolerance  -met  2   Pt to demonstrate increased AROM of L ankle DF by at least 5-10 degrees in order to allow for greater ease and independence with ADLs and functional mobility with improved foot/ankle mechanics and reduced pain  -met  3  Pt to demonstrate increased MMT of L ankle DF and inversion by at least 1/2-1 grade in order to improve safety and stability with ADLs and functional mobility  - met  4  Pt to demonstrate improved L calf strength as noted by ability to perform at least 10 consecutive single L HR from floor without UE assist with full ROM  - met     LTGs to be achieved in 6-8 weeks:  1  Pt will be I with HEP in order to continue to improve quality of life and independence and reduce risk for re-injury  -met  2  Pt to demonstrate return to normal gait, standing, and stair climbing without pain and without limitations or restrictions  - met per pt  3  Pt to demonstrate improved function as noted by achieving or exceeding predicted score on FOTO outcomes assessment tool  -met       Plan  Planned therapy interventions: home exercise program  Frequency: 2x week  Treatment plan discussed with: patient        Subjective Evaluation    History of Present Illness  Mechanism of injury: Pt notes overall, prior to about 2 weeks ago, her ankle sx seemed to be improving  Since jumping up/down to turn smoke alarm off, she has had a steady pain in L achilles tendon/heel "like it was before"  Overall is no worse  No new sx/complaints  Pain seems to be better after exercises  No consistent triggers; gets flared up often "for no reason"  Redness and swelling over L > R achilles tendon persists off/on  No worse with PT exercises  Admits to be not as consistent with exercises as she should be     Quality of life: good    Pain  Current pain ratin  At best pain ratin  At worst pain ratin  Location: L heel cord   Quality: dull ache and tight  Relieving factors: rest and heat  Aggravating factors: standing, walking and stair climbing  Progression: no change (improved before recent flare up, now no change in past 2 weeks)    Social Support  Steps to enter house: yes  Stairs in house: no   Lives in: [place]  Lives with: spouse and young children    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 )  Hand dominance: right      Diagnostic Tests  No diagnostic tests performed  Treatments  Previous treatment: medication  Current treatment: medication and physical therapy  Current treatment comments: OTC prn  Patient Goals  Patient goals for therapy: decreased pain  Patient's goals regarding treatment: learn exercises I can do at home  Objective     Observations   Left Ankle/Foot   Negative for deformity and edema  Additional Observation Details  After removing sneakers, note mild redness heel cord insertions L /R heel   No warmth or swelling noted  Will monitor         Tenderness   Left Ankle/Foot   Tenderness in the Achilles insertion  No tenderness in the posterior talofibular ligament  Additional Tenderness Details  Moderate ttp mid-distal Florecita's tendon on the L , > laterally vs medial attachment       Neurological Testing     Sensation     Ankle/Foot   Left Ankle/Foot   Intact: light touch    Right Ankle/Foot   Intact: light touch     Active Range of Motion   Left Ankle/Foot   Dorsiflexion (ke): 18 degrees   Plantar flexion: 55 degrees   Inversion: 35 degrees   Eversion: 10 degrees     Right Ankle/Foot   Normal active range of motion    Additional Active Range of Motion Details  Mild pain L heel cord with end range PF and inversion with pulling pain into calf - resolved  No pain/sx with L ankle AROM all planes      Joint Play   Left Ankle/Foot  Joints within functional limits are the talocrural joint       Strength/Myotome Testing     Left Ankle/Foot   Dorsiflexion: 4+  Plantar flexion: 4  Inversion: 4-  Eversion: 4 (4-4+/5)    Right Ankle/Foot   Dorsiflexion: 4+  Plantar flexion: 4+  Inversion: 4  Eversion: 4 (4-4+)    Additional Strength Details  No pain with MMT screening  Will cont to assess    Weakness noted L ankle vs R with singe leg HR; no pain  Limited L heel elevation AROM with single HR with difficulty     Tests   Left Ankle/Foot   Positive for calcaneal squeeze  Negative for anterior drawer, posterior drawer, Bautista, valgus tilt and varus tilt       Additional Tests Details  Normal mid/hindfoot alignment noted in WB without footwear B    R SLS floor WNL  L SLS floor with increased ankle/foot instability with 1-2x LOB over 15-20" with mild heel cord pain - no pain,continues with reduced L ankle stability vs R in SLS   Fair-good L foot/ankle mobility with B LE squatting in standing with mild pain L heel cord - resolved       Ambulation     Quality of Movement During Gait     Additional Quality of Movement During Gait Details  Grossly WNL      Flowsheet Rows      Most Recent Value   PT/OT G-Codes   Current Score  82   Projected Score  79             Re-eval Date: 8/13    Date 6/16 6/21 6/28 7/7 7/14   Visit Count 1 2 3 4 5   FOTO 6/16       Pain In See IE 1-2/10  0/10 3/10 3/10   Pain Out See IE 1-2/10 2-3/10  1-2/10 2/10             Manuals 6/16 6/21 6/28 7/7 7/14        GIASTM instrument #4 L calf with sweeping/fanning to tolerance, gentle brushing/framing L heel cord to tolerance pt prone   10'         Pt signed GIASTM consent form without questions/concerns 7/14                   Neuro Re-Ed        Romberg            tandem           Floor   L/R   reviewed for HEP           Floor L   3x30" 0HHA  Close S  Foam 3x30" EC   0HHA close S     Floor L /R  3x30"ea 0HHA  Close S   EC  Foam 3x30" EC   0HHA close S     Foam L /R  3x30"ea 0HHA  Close S   EO    SLS   Floor   EO L   2x30" o HHA  Close S  Floor   EO L   2x30" o HHA  Close S     Star drill         Steamboats foam                                Ther Ex        Nustep vs [facility]      Calf stretch           seated vs standing   reviewed  [facility] L 1 5'     Semi sitting 4x30" L  [facility] L 1 5'     Semi sitting 4x30" L  [facility] L 1 5'     standing 4x30" L/Haylee [address] [facility] L1  5'       Reviewed    Heel raises B 10x  reviewed  3x10 B from floor    eccentric L HR   3x10   With cues   From floor 0#  3x10 B floor         2x10 L from the floor  Cues   0#         Single leg HR L   2x5-10 /5" 3x10 B floor       eccentric L HR   3x10   With cues   From bottom step 0#       Single leg HR L   3x5 /5" 3x10 B floor       eccentric L HR   3x10   With cues   From bottom step 0#       Single leg HR L   3-4x5 /5"   Ankle tband 4 way   Green   2x10 /5" ea  Green   2x10 /5" ea  Green   2x15 /5" ea  Green   2x15 /5" ea    Seated towel scrunches   30x/5" cues   Reviewed  Cues with balance exercises for arch lift/pressing toes into floor                                      Ther Activity                        Gait Training                        Modalities   deferred deferred deferred

## 2021-09-09 ENCOUNTER — TELEMEDICINE (OUTPATIENT)
Dept: BEHAVIORAL/MENTAL HEALTH CLINIC | Facility: CLINIC | Age: 31
End: 2021-09-09
Payer: COMMERCIAL

## 2021-09-09 DIAGNOSIS — F32.0 CURRENT MILD EPISODE OF MAJOR DEPRESSIVE DISORDER, UNSPECIFIED WHETHER RECURRENT (HCC): Primary | ICD-10-CM

## 2021-09-09 DIAGNOSIS — F41.1 GAD (GENERALIZED ANXIETY DISORDER): ICD-10-CM

## 2021-09-09 PROCEDURE — 90834 PSYTX W PT 45 MINUTES: CPT | Performed by: SOCIAL WORKER

## 2021-09-09 NOTE — PSYCH
This note was not shared with the patient due to this is a psychotherapy note      Virtual Regular Visit    Verification of patient location:    Patient is located in the following state in which I hold an active license [place]      Assessment/Plan:    Problem List Items Addressed This Visit        Other    Depression - Primary    PEPE (generalized anxiety disorder)          Goals addressed in session: Goal 1          Reason for visit is   Chief Complaint   Patient presents with    Virtual Regular Visit        Encounter provider Terry Carrillo    Provider located at [address] [place] [id number] [phone]        The patient was identified by name and date of birth  Austin Collins was informed that this is a telemedicine visit and that the visit is being conducted through[employer] and patient was informed that this is a secure, HIPAA-compliant platform  She agrees to proceed     My office door was closed  No one else was in the room  She acknowledged consent and understanding of privacy and security of the video platform  The patient has agreed to participate and understands they can discontinue the visit at any time  Patient is aware this is a billable service  Subjective  Austin Collisn is a 33 y o  female   Psychotherapy Provided: Individual Psychotherapy 50 minutes     Length of time in session: 50 minutes, follow up in 2 week    Goals addressed in session: Goal 1     Pain:      none    0    Current suicide risk : Low     Met with Archana  She states, "I'm alright "   Richelle Ontiveros is back at work and is reporting no issues since she returned  She interviewed for 2 other jobs; however, they did not offer benefits  She applied to 3 more jobs since then  Her  continues to go to individual therapy and Richelle Ontiveros continues to voice improvement in their relationship  Richelle Ontiveros also remains close to her neighbor  Session focused on Archana processing her thoughts and feelings about some family stress; specifically about her brother's mental health and her father having another heart attack  She described her worry and anxiety; especially with being 1 hr away from both of them  She expressed feelings of frustration with neither of her family member's making positive changes to improve their wellness  Validation and support was provided  She was encouraged to maintain limits/boundaries for her emotional health; while remaining supportive  Leighton Ugarte will continue to implement wellness tools; share successes and challenges  Continue biweekly support  Mental status:  Appearance calm and cooperative , adequate hygiene and grooming and good eye contact    Mood anxious   Affect Mood congruent   Speech a normal rate and volume   Thought Processes coherent/organized and normal thought processes   Hallucinations no hallucinations present    Thought Content no delusions   Abnormal Thoughts no suicidal thoughts  and no homicidal thoughts    Orientation  oriented to person and place and time   Remote Memory short term memory intact and long term memory intact   Attention Span concentration intact   Intellect Appears to be of Average Intelligence   Fund of Knowledge displays adequate knowledge of current events   Insight fair   Judgement fair   Muscle Strength Unable to assess due to virtual session   Language no difficulty naming common objects   Pain none   Pain Scale 0     [address]: Diagnosis and Treatment Plan explained to Aurther Sep relates understanding diagnosis and is agreeable to Treatment Plan  Yes     I spent 50 minutes directly with the patient during this visit    VIRTUAL VISIT DISCLAIMER    Christina Javier verbally agrees to participate in [employer]   Pt is aware that [employer] could be limited without vital signs or the ability to perform a full hands-on physical Nicci Calvo understands she or the provider may request at any time to terminate the video visit and request the patient to seek care or treatment in person

## 2021-09-29 ENCOUNTER — TELEMEDICINE (OUTPATIENT)
Dept: PSYCHIATRY | Facility: CLINIC | Age: 31
End: 2021-09-29
Payer: COMMERCIAL

## 2021-09-29 DIAGNOSIS — F32.0 CURRENT MILD EPISODE OF MAJOR DEPRESSIVE DISORDER, UNSPECIFIED WHETHER RECURRENT (HCC): ICD-10-CM

## 2021-09-29 DIAGNOSIS — F41.1 GAD (GENERALIZED ANXIETY DISORDER): Primary | ICD-10-CM

## 2021-09-29 DIAGNOSIS — F41.9 ANXIETY: ICD-10-CM

## 2021-09-29 PROCEDURE — 99213 OFFICE O/P EST LOW 20 MIN: CPT | Performed by: PHYSICIAN ASSISTANT

## 2021-09-29 RX ORDER — BUSPIRONE HYDROCHLORIDE 5 MG/1
5 TABLET ORAL 3 TIMES DAILY
Qty: 90 TABLET | Refills: 1 | Status: SHIPPED | OUTPATIENT
Start: 2021-09-29 | End: 2022-01-19 | Stop reason: SDUPTHER

## 2021-09-29 RX ORDER — TRAZODONE HYDROCHLORIDE 50 MG/1
25 TABLET ORAL
Qty: 45 TABLET | Refills: 0 | Status: SHIPPED | OUTPATIENT
Start: 2021-09-29 | End: 2022-01-19

## 2021-09-29 RX ORDER — SERTRALINE HYDROCHLORIDE 25 MG/1
25 TABLET, FILM COATED ORAL DAILY
Qty: 90 TABLET | Refills: 0 | Status: SHIPPED | OUTPATIENT
Start: 2021-09-29 | End: 2022-01-19 | Stop reason: SDUPTHER

## 2021-10-07 ENCOUNTER — TELEMEDICINE (OUTPATIENT)
Dept: BEHAVIORAL/MENTAL HEALTH CLINIC | Facility: CLINIC | Age: 31
End: 2021-10-07
Payer: COMMERCIAL

## 2021-10-07 DIAGNOSIS — F32.0 CURRENT MILD EPISODE OF MAJOR DEPRESSIVE DISORDER, UNSPECIFIED WHETHER RECURRENT (HCC): Primary | ICD-10-CM

## 2021-10-07 DIAGNOSIS — F41.1 GAD (GENERALIZED ANXIETY DISORDER): ICD-10-CM

## 2021-10-07 PROCEDURE — 90834 PSYTX W PT 45 MINUTES: CPT | Performed by: SOCIAL WORKER

## 2021-10-20 ENCOUNTER — TELEPHONE (OUTPATIENT)
Dept: PSYCHIATRY | Facility: CLINIC | Age: 31
End: 2021-10-20

## 2021-10-21 ENCOUNTER — TELEMEDICINE (OUTPATIENT)
Dept: BEHAVIORAL/MENTAL HEALTH CLINIC | Facility: CLINIC | Age: 31
End: 2021-10-21
Payer: COMMERCIAL

## 2021-10-21 DIAGNOSIS — F32.0 CURRENT MILD EPISODE OF MAJOR DEPRESSIVE DISORDER, UNSPECIFIED WHETHER RECURRENT (HCC): Primary | ICD-10-CM

## 2021-10-21 DIAGNOSIS — F41.1 GAD (GENERALIZED ANXIETY DISORDER): ICD-10-CM

## 2021-10-21 PROCEDURE — 90834 PSYTX W PT 45 MINUTES: CPT | Performed by: SOCIAL WORKER

## 2021-11-03 ENCOUNTER — TELEPHONE (OUTPATIENT)
Dept: PSYCHIATRY | Facility: CLINIC | Age: 31
End: 2021-11-03

## 2021-11-11 ENCOUNTER — OFFICE VISIT (OUTPATIENT)
Dept: DERMATOLOGY | Facility: CLINIC | Age: 31
End: 2021-11-11
Payer: COMMERCIAL

## 2021-11-11 VITALS — BODY MASS INDEX: 20.64 KG/M2 | WEIGHT: 123.9 LBS | HEIGHT: 65 IN | TEMPERATURE: 97.9 F

## 2021-11-11 DIAGNOSIS — D22.5 MULTIPLE BENIGN NEVI OF UPPER EXTREMITY, LOWER EXTREMITY, AND TRUNK: Primary | ICD-10-CM

## 2021-11-11 DIAGNOSIS — L82.1 SEBORRHEIC KERATOSIS: ICD-10-CM

## 2021-11-11 DIAGNOSIS — D22.60 MULTIPLE BENIGN NEVI OF UPPER EXTREMITY, LOWER EXTREMITY, AND TRUNK: Primary | ICD-10-CM

## 2021-11-11 DIAGNOSIS — D22.70 MULTIPLE BENIGN NEVI OF UPPER EXTREMITY, LOWER EXTREMITY, AND TRUNK: Primary | ICD-10-CM

## 2021-11-11 PROCEDURE — 17110 DESTRUCTION B9 LES UP TO 14: CPT | Performed by: STUDENT IN AN ORGANIZED HEALTH CARE EDUCATION/TRAINING PROGRAM

## 2021-11-11 PROCEDURE — 99203 OFFICE O/P NEW LOW 30 MIN: CPT | Performed by: STUDENT IN AN ORGANIZED HEALTH CARE EDUCATION/TRAINING PROGRAM

## 2021-11-18 ENCOUNTER — TELEMEDICINE (OUTPATIENT)
Dept: BEHAVIORAL/MENTAL HEALTH CLINIC | Facility: CLINIC | Age: 31
End: 2021-11-18
Payer: COMMERCIAL

## 2021-11-18 DIAGNOSIS — F41.1 GAD (GENERALIZED ANXIETY DISORDER): ICD-10-CM

## 2021-11-18 DIAGNOSIS — F32.0 CURRENT MILD EPISODE OF MAJOR DEPRESSIVE DISORDER, UNSPECIFIED WHETHER RECURRENT (HCC): Primary | ICD-10-CM

## 2021-11-18 PROCEDURE — 90834 PSYTX W PT 45 MINUTES: CPT | Performed by: SOCIAL WORKER

## 2021-11-18 NOTE — OB LABOR/OXYTOCIN SAFETY PROGRESS
Oxytocin Safety Progress Check Note - Trisha Gomez 30 y o  female MRN: [id number]    Unit/Bed#: L&D 325-01 Encounter: [id number]    Dose (citlaly-units/min) Oxytocin: 2 citlaly-units/min  Contraction Frequency (minutes): 5  Contraction Quality: Moderate  Tachysystole: No   Dilation: 8        Effacement (%): 100  Station: 1  Baseline Rate: 140 bpm  Fetal Heart Rate: 135 BPM  FHR Category: Category II  Oxytocin Safety Progress Check: Safety check completed          Notes/comments:   Cat 2 just prior to exam, noted to have significant progress in dilation and station  Will reposition and continue to monitor       Shanta Doughetry MD 1/6/2020 9:39 AM Home with 24/7 assist if family willing and able to provide level of assist, vs MONSERRAT

## 2021-12-01 ENCOUNTER — OFFICE VISIT (OUTPATIENT)
Dept: FAMILY MEDICINE CLINIC | Facility: CLINIC | Age: 31
End: 2021-12-01
Payer: COMMERCIAL

## 2021-12-01 VITALS
OXYGEN SATURATION: 98 % | DIASTOLIC BLOOD PRESSURE: 70 MMHG | HEIGHT: 66 IN | SYSTOLIC BLOOD PRESSURE: 118 MMHG | RESPIRATION RATE: 18 BRPM | BODY MASS INDEX: 20.51 KG/M2 | TEMPERATURE: 98.6 F | HEART RATE: 74 BPM | WEIGHT: 127.6 LBS

## 2021-12-01 DIAGNOSIS — H92.03 OTALGIA OF BOTH EARS: Primary | ICD-10-CM

## 2021-12-01 DIAGNOSIS — M79.641 RIGHT HAND PAIN: ICD-10-CM

## 2021-12-01 PROCEDURE — 99214 OFFICE O/P EST MOD 30 MIN: CPT | Performed by: FAMILY MEDICINE

## 2021-12-01 RX ORDER — AMOXICILLIN AND CLAVULANATE POTASSIUM 875; 125 MG/1; MG/1
1 TABLET, FILM COATED ORAL EVERY 12 HOURS SCHEDULED
Qty: 20 TABLET | Refills: 0 | Status: SHIPPED | OUTPATIENT
Start: 2021-12-01 | End: 2021-12-11

## 2021-12-04 PROBLEM — M79.641 RIGHT HAND PAIN: Status: ACTIVE | Noted: 2021-12-04

## 2021-12-04 PROBLEM — H92.03 OTALGIA OF BOTH EARS: Status: ACTIVE | Noted: 2021-12-04

## 2021-12-14 ENCOUNTER — IMMUNIZATIONS (OUTPATIENT)
Dept: FAMILY MEDICINE CLINIC | Facility: HOSPITAL | Age: 31
End: 2021-12-14

## 2021-12-14 DIAGNOSIS — Z23 ENCOUNTER FOR IMMUNIZATION: Primary | ICD-10-CM

## 2021-12-14 PROCEDURE — 0064A COVID-19 MODERNA VACC 0.25 ML BOOSTER: CPT

## 2021-12-14 PROCEDURE — 91306 COVID-19 MODERNA VACC 0.25 ML BOOSTER: CPT

## 2021-12-16 ENCOUNTER — TELEMEDICINE (OUTPATIENT)
Dept: BEHAVIORAL/MENTAL HEALTH CLINIC | Facility: CLINIC | Age: 31
End: 2021-12-16
Payer: COMMERCIAL

## 2021-12-16 DIAGNOSIS — F33.0 MILD EPISODE OF RECURRENT MAJOR DEPRESSIVE DISORDER (HCC): Primary | ICD-10-CM

## 2021-12-16 DIAGNOSIS — F41.1 GAD (GENERALIZED ANXIETY DISORDER): ICD-10-CM

## 2021-12-16 PROCEDURE — 90834 PSYTX W PT 45 MINUTES: CPT | Performed by: SOCIAL WORKER

## 2022-01-05 ENCOUNTER — TELEPHONE (OUTPATIENT)
Dept: OBGYN CLINIC | Facility: MEDICAL CENTER | Age: 32
End: 2022-01-05

## 2022-01-05 ENCOUNTER — APPOINTMENT (OUTPATIENT)
Dept: LAB | Facility: CLINIC | Age: 32
End: 2022-01-05
Payer: COMMERCIAL

## 2022-01-05 DIAGNOSIS — Z32.01 POSITIVE PREGNANCY TEST: ICD-10-CM

## 2022-01-05 DIAGNOSIS — Z32.01 POSITIVE PREGNANCY TEST: Primary | ICD-10-CM

## 2022-01-05 LAB
B-HCG SERPL-ACNC: ABNORMAL MIU/ML
PROGEST SERPL-MCNC: 11.9 NG/ML

## 2022-01-05 PROCEDURE — 84702 CHORIONIC GONADOTROPIN TEST: CPT

## 2022-01-05 PROCEDURE — 84144 ASSAY OF PROGESTERONE: CPT

## 2022-01-05 PROCEDURE — 36415 COLL VENOUS BLD VENIPUNCTURE: CPT

## 2022-01-05 NOTE — TELEPHONE ENCOUNTER
Patient called about a positive pregnancy her lmp was 11/24 and will be going to a [facility] to get her blood work done  Please review and contact patient

## 2022-01-13 ENCOUNTER — TELEMEDICINE (OUTPATIENT)
Dept: BEHAVIORAL/MENTAL HEALTH CLINIC | Facility: CLINIC | Age: 32
End: 2022-01-13
Payer: COMMERCIAL

## 2022-01-13 DIAGNOSIS — F41.1 GAD (GENERALIZED ANXIETY DISORDER): ICD-10-CM

## 2022-01-13 DIAGNOSIS — F32.0 CURRENT MILD EPISODE OF MAJOR DEPRESSIVE DISORDER, UNSPECIFIED WHETHER RECURRENT (HCC): Primary | ICD-10-CM

## 2022-01-13 PROCEDURE — 90834 PSYTX W PT 45 MINUTES: CPT | Performed by: SOCIAL WORKER

## 2022-01-13 NOTE — PSYCH
This note was not shared with the patient due to this is a psychotherapy note      Virtual Regular Visit    Verification of patient location:    Patient is located in the following state in which I hold an active license [place]      Assessment/Plan:    Problem List Items Addressed This Visit        Other    Depression - Primary    PEPE (generalized anxiety disorder)          Goals addressed in session: Goal 1          Reason for visit is   Chief Complaint   Patient presents with    Virtual Regular Visit        Encounter provider [facility]    Provider located at [address] [place] [id number] [phone]      Recent Visits  No visits were found meeting these conditions  Showing recent visits within past 7 days and meeting all other requirements  Future Appointments  No visits were found meeting these conditions  Showing future appointments within next 150 days and meeting all other requirements       The patient was identified by name and date of birth  Malou Obrien was informed that this is a telemedicine visit and that the visit is being conducted through[employer] Embedded and patient was informed this is a secure, HIPAA-complaint platform  She agrees to proceed     My office door was closed  No one else was in the room  She acknowledged consent and understanding of privacy and security of the video platform  The patient has agreed to participate and understands they can discontinue the visit at any time  Patient is aware this is a billable service  Subjective  Malou Obrien is a 33 y o  female   Psychotherapy Provided: Individual Psychotherapy 40 minutes     Length of time in session: 40 minutes, follow up in 1 month    Goals addressed in session: Goal 1     Pain:      none    0    Current suicide risk : Low     Met with Archana    She states, "I am pregnant "  Deon Gomez and her family are excited about the pregnancy  She is reporting an increase in anxiety that began about 1 week ago  She believes it is hormone related  Session focused on discussing how she is coping, and how she is remaining clear about using her coping skills  Ant Goodson told the story of an incident with a friend- describing herself 'going down a rabbit hole' on the internet, feeling her child had ASD or another sensory disorder  Vi Cordero was able to use coping mechanisms learned in sessions; STOP skill, putting on thoughts on trial, and reaching out to a support to help rationalize  Through the use of skills, Vi Cordero was able to dispute negative cognitive patterns; expressing she needs to place limits with this friend, verbalizing awareness that issues with healthcare are a trigger to her anxiety  Vi Cordero is also working to wean her daughter, John Singh off nursing  She expressed her struggles with this and anxiety, worry this coming weekend as she will be away from [place] for 2 nights due to a wedding (longest she has been away from [place] is 12 hours)  We discussed this as a positive step for her and her daughter  We discussed how she is coping with stress in the workplace  Vi Cordero continues to identify her job as a stressor  When she gets overwhelmed, she is writing an email- using the 24 hour rule and not sending it, and when coming back to it when less emotionally reactive, making an effective choice to delete and not send  She was praised for her effective choices, use of skills since last session  Continue wellness tools; share successes and challenges  Continue monthly support  OP psychiatric provider Nima Whatley made aware of her pregnancy, Vi Cordero was asked to call the office and move up her medication management appt per Patrick Prater          Mental status:  Appearance calm and cooperative , adequate hygiene and grooming and good eye contact    Mood anxious   Affect Affect appropriate, mood congruent   Speech a normal rate and volume   Thought Processes coherent/organized and normal thought processes   Hallucinations no hallucinations present    Thought Content no delusions, worry, ruminations about stressors, cognitive distortions   Abnormal Thoughts no suicidal thoughts  and no homicidal thoughts    Orientation  oriented to person and place and time   Remote Memory short term memory intact and long term memory intact   Attention Span concentration intact   Intellect Appears to be of Average Intelligence   Fund of Knowledge displays adequate knowledge of current events   Insight fair   Judgement fair   Muscle Strength Unable to assess due to virtual session   Language no difficulty naming common objects   Pain none   Pain Scale 0         [address]: Diagnosis and Treatment Plan explained to Tashafarshadabrahan Astorga relates understanding diagnosis and is agreeable to Treatment Plan  Yes         I spent 40 minutes directly with the patient during this visit    VIRTUAL VISIT DISCLAIMER    Ahsan Angel verbally agrees to participate in [employer]  Pt is aware that [employer] could be limited without vital signs or the ability to perform a full hands-on physical Lollie Beam understands she or the provider may request at any time to terminate the video visit and request the patient to seek care or treatment in person

## 2022-01-19 ENCOUNTER — TELEMEDICINE (OUTPATIENT)
Dept: PSYCHIATRY | Facility: CLINIC | Age: 32
End: 2022-01-19
Payer: COMMERCIAL

## 2022-01-19 DIAGNOSIS — F41.9 ANXIETY: ICD-10-CM

## 2022-01-19 DIAGNOSIS — F32.0 CURRENT MILD EPISODE OF MAJOR DEPRESSIVE DISORDER, UNSPECIFIED WHETHER RECURRENT (HCC): Primary | ICD-10-CM

## 2022-01-19 DIAGNOSIS — F41.1 GAD (GENERALIZED ANXIETY DISORDER): ICD-10-CM

## 2022-01-19 PROCEDURE — 99213 OFFICE O/P EST LOW 20 MIN: CPT | Performed by: PHYSICIAN ASSISTANT

## 2022-01-19 RX ORDER — SERTRALINE HYDROCHLORIDE 25 MG/1
25 TABLET, FILM COATED ORAL DAILY
Qty: 90 TABLET | Refills: 0 | Status: SHIPPED | OUTPATIENT
Start: 2022-01-19 | End: 2022-06-08 | Stop reason: SDUPTHER

## 2022-01-19 RX ORDER — BUSPIRONE HYDROCHLORIDE 5 MG/1
5 TABLET ORAL 2 TIMES DAILY
Qty: 60 TABLET | Refills: 1 | Status: SHIPPED | OUTPATIENT
Start: 2022-01-19 | End: 2022-02-21
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
## 2022-02-01 NOTE — PROGRESS NOTES
Personalized Preventive Plan for Angela Brody - 2/1/2022  Medicare offers a range of preventive health benefits. Some of the tests and screenings are paid in full while other may be subject to a deductible, co-insurance, and/or copay. Some of these benefits include a comprehensive review of your medical history including lifestyle, illnesses that may run in your family, and various assessments and screenings as appropriate. After reviewing your medical record and screening and assessments performed today your provider may have ordered immunizations, labs, imaging, and/or referrals for you. A list of these orders (if applicable) as well as your Preventive Care list are included within your After Visit Summary for your review. Other Preventive Recommendations:    · A preventive eye exam performed by an eye specialist is recommended every 1-2 years to screen for glaucoma; cataracts, macular degeneration, and other eye disorders. · A preventive dental visit is recommended every 6 months. · Try to get at least 150 minutes of exercise per week or 10,000 steps per day on a pedometer . · Order or download the FREE \"Exercise & Physical Activity: Your Everyday Guide\" from [employer]. Call 3-221.343.9241 or search [employer] online. · You need 7813-6387 mg of calcium and 1842-6865 IU of vitamin D per day. It is possible to meet your calcium requirement with diet alone, but a vitamin D supplement is usually necessary to meet this goal.  · When exposed to the sun, use a sunscreen that protects against both UVA and UVB radiation with an SPF of 30 or greater. Reapply every 2 to 3 hours or after sweating, drying off with a towel, or swimming. · Always wear a seat belt when traveling in a car. Always wear a helmet when riding a bicycle or motorcycle. Progress Note - OB/GYN   Cheri Mendez 30 y o  female MRN: [id number]  Unit/Bed#: L&D 312-01 Encounter: [id number]    Assessment:  30 y o   s/p Vacuum Vaginal Delivery Postpartum day  2    Plan:  1  Routine post-partum care  2  Encourage ambulation  3  Pain control as needed  4  Advance diet as tolerated  5  Rhogam not indicated  6  Depression: home Zoloft 100 mg  7  Anticipate discharge 20    Subjective/Objective   Chief Complaint:       Subjective:  Cheri Mendez is well appearing and has no complaints at this time  She denies any dizziness, nausea, vomiting, chest pain, shortness of breath, palpitations, or headaches  Pain: Well controlled with pain medication regimen  Tolerating PO: yes  Voiding: yes  Flatus: yes  BM: no  Ambulating: yes  Breastfeeding: yes  Chest pain: no  Shortness of breath: no  Leg pain: no  Lochia: Decreasing    Objective:     Vitals: Blood pressure 119/78, pulse 57, temperature 97 8 °F (36 6 °C), temperature source Oral, resp  rate 18, height 5' 5" (1 651 m), weight 78 9 kg (174 lb), last menstrual period 2019, SpO2 98 %, currently breastfeeding    No intake or output data in the 24 hours ending 20 0533    Physical Exam:     General: NAD  Cardiovascular: RRR, no murmur, nl S1/S2   Lungs: CTAB, non-labored breathing   Abdomen: Soft, no distension/rebound/guarding/tenderness   Fundus: Firm, non-tender, fundus: -2 cm below the umbilicus   Lower Extremities: Non-tender      Lab, Imaging and other studies:     Recent Results (from the past 72 hour(s))   Type and screen    Collection Time: 20  8:46 PM   Result Value Ref Range    ABO Grouping A     Rh Factor Positive     Antibody Screen Negative     Specimen Expiration Date [id number]    CBC and differential    Collection Time: 20  8:46 PM   Result Value Ref Range    WBC 9 46 4 31 - 10 16 Thousand/uL    RBC 3 87 3 81 - 5 12 Million/uL    Hemoglobin 13 0 11 5 - 15 4 g/dL    Hematocrit 39 0 34 8 - 46 1 %     (H) 82 - 98 fL    MCH 33 6 26 8 - 34 3 pg    MCHC 33 3 31 4 - 37 4 g/dL    RDW 12 9 11 6 - 15 1 %    MPV 11 0 8 9 - 12 7 fL    Platelets 740 232 - 463 Thousands/uL    nRBC 0 /100 WBCs    Neutrophils Relative 72 43 - 75 %    Immat GRANS % 1 0 - 2 %    Lymphocytes Relative 17 14 - 44 %    Monocytes Relative 9 4 - 12 %    Eosinophils Relative 1 0 - 6 %    Basophils Relative 0 0 - 1 %    Neutrophils Absolute 6 86 1 85 - 7 62 Thousands/µL    Immature Grans Absolute 0 07 0 00 - 0 20 Thousand/uL    Lymphocytes Absolute 1 62 0 60 - 4 47 Thousands/µL    Monocytes Absolute 0 81 0 17 - 1 22 Thousand/µL    Eosinophils Absolute 0 08 0 00 - 0 61 Thousand/µL    Basophils Absolute 0 02 0 00 - 0 10 Thousands/µL   RPR    Collection Time: 01/05/20  8:46 PM   Result Value Ref Range    RPR Non-Reactive Non-Reactive   Fingerstick Glucose (POCT)    Collection Time: 01/05/20  8:57 PM   Result Value Ref Range    POC Glucose 117 65 - 140 mg/dl   Fingerstick Glucose (POCT)    Collection Time: 01/05/20 10:53 PM   Result Value Ref Range    POC Glucose 92 65 - 140 mg/dl   Fingerstick Glucose (POCT)    Collection Time: 01/06/20  2:00 AM   Result Value Ref Range    POC Glucose 64 (L) 65 - 140 mg/dl   Fingerstick Glucose (POCT)    Collection Time: 01/06/20  3:01 AM   Result Value Ref Range    POC Glucose 73 65 - 140 mg/dl   Fingerstick Glucose (POCT)    Collection Time: 01/06/20  3:57 AM   Result Value Ref Range    POC Glucose 75 65 - 140 mg/dl   Fingerstick Glucose (POCT)    Collection Time: 01/06/20  4:55 AM   Result Value Ref Range    POC Glucose 85 65 - 140 mg/dl   Fingerstick Glucose (POCT)    Collection Time: 01/06/20  5:41 AM   Result Value Ref Range    POC Glucose 83 65 - 140 mg/dl   Fingerstick Glucose (POCT)    Collection Time: 01/06/20  7:09 AM   Result Value Ref Range    POC Glucose 72 65 - 140 mg/dl   Fingerstick Glucose (POCT)    Collection Time: 01/06/20  9:15 AM   Result Value Ref Range    POC Glucose 63 (L) 65 - 140 mg/dl Fingerstick Glucose (POCT)    Collection Time: 01/06/20 11:16 AM   Result Value Ref Range    POC Glucose 77 65 - 140 mg/dl   Fingerstick Glucose (POCT)    Collection Time: 01/06/20  1:14 PM   Result Value Ref Range    POC Glucose 66 65 - 140 mg/dl   Fingerstick Glucose (POCT)    Collection Time: 01/06/20  3:04 PM   Result Value Ref Range    POC Glucose 76 65 - 140 mg/dl   Blood gas, arterial, cord    Collection Time: 01/06/20  4:13 PM   Result Value Ref Range    pH, Cord Art 7 073 (L) 7 230 - 7 430    pCO2, Cord Art 61 4 (H) 30 0 - 60 0    pO2, Cord Art 15 3 5 0 - 25 0 mm HG    HCO3, Cord Art 17 5 17 3 - 27 3 mmol/L    Base Exc, Cord Art -13 1 (L) 3 0 - 11 0 mmol/L    O2 Content, Cord Art 3 3 ml/dl    O2 Hgb, Arterial Cord 17 7 %   Blood gas, venous, cord    Collection Time: 01/06/20  4:13 PM   Result Value Ref Range    pH, Cord Joel 7 148 (L) 7 190 - 7 490    pCO2, Cord Joel 54 2 (H) 27 0 - 43 0 mm HG    pO2, Cord Joel 18 9 15 0 - 45 0 mm HG    HCO3, Cord Joel 18 4 12 2 - 28 6 mmol/L    Base Exc, Cord Joel -10 8 (L) 1 0 - 9 0 mmol/L    O2 Cont, Cord Joel 6 5 mL/dL    O2 HGB,VENOUS CORD 33 5 %     Meds:    senna 1 tablet Oral Daily   sertraline 100 mg Oral Daily       acetaminophen 650 mg Q6H PRN   benzocaine-menthol-lanolin-aloe  4x Daily PRN   calcium carbonate 1,000 mg Daily PRN   hydrocortisone 1 application 4x Daily PRN   ibuprofen 600 mg Q6H PRN   ondansetron 4 mg Q8H PRN   witch hazel-glycerin 1 pad PRN             Signature / Title: Aleks Lobato MD, Ob/Gyn, PGY-1  Date: 1/8/2020  Time: 5:33 AM

## 2022-02-03 ENCOUNTER — ULTRASOUND (OUTPATIENT)
Dept: OBGYN CLINIC | Facility: MEDICAL CENTER | Age: 32
End: 2022-02-03
Payer: COMMERCIAL

## 2022-02-03 VITALS
HEIGHT: 65 IN | WEIGHT: 129 LBS | DIASTOLIC BLOOD PRESSURE: 70 MMHG | BODY MASS INDEX: 21.49 KG/M2 | SYSTOLIC BLOOD PRESSURE: 100 MMHG

## 2022-02-03 DIAGNOSIS — N92.6 MISSED MENSES: Primary | ICD-10-CM

## 2022-02-03 PROCEDURE — 99214 OFFICE O/P EST MOD 30 MIN: CPT | Performed by: STUDENT IN AN ORGANIZED HEALTH CARE EDUCATION/TRAINING PROGRAM

## 2022-02-03 PROCEDURE — 76801 OB US < 14 WKS SINGLE FETUS: CPT | Performed by: STUDENT IN AN ORGANIZED HEALTH CARE EDUCATION/TRAINING PROGRAM

## 2022-02-03 NOTE — PROGRESS NOTES
Pregnancy Confirmation Visit  [facility] [address] [place] [address]    Assessment/Plan:  33 y o  [id number] presenting with missed menses  Viable pregnancy 10w1d by LMP consistent with ultrasound today (DAVID 08/31/2022)  - Continue/start prenatal vitamin  - We reviewed her current medications and discussed which are safe to continue in pregnancy  - We briefly discussed options for aneuploidy screening, to be discussed further at the prenatal intake  - Schedule prenatal intake with RN and initial prenatal visit; prenatal labs will be ordered during the prenatal intake      Subjective:    CC: Missed period    Alli Ferrer is a 33 y o  Chuyita Larry who presents with missed menses  No LMP recorded  Patient notes that this pregnancy was planned and desired  She was not using contraception at the time of conception  She reports she is certain of her LMP and that she has regular menses  She has has no vaginal bleeding since her LMP      Objective:  /70   Ht 5' 5" (1 651 m)   Wt 58 5 kg (129 lb)   BMI 21 47 kg/m²     Physical Exam:  General: Well appearing, no distress  CV: Regular rate  Respiratory: Unlabored breathing  Abdomen: Soft, nontender  Extremities: Without edema  Mood and Affect: Appropriate    Transvaginal Pelvic Ultrasound  Ramirez IUP  Yolk sac: Present  Fetal Pole: Present  CRL consistent with EGA [id number]  Cardiac activity: Present   bpm  No adnexal masses appreciated                    Lena Ramos MD  [address] [place] [address]  2/3/2022 5:38 PM

## 2022-02-07 ENCOUNTER — INITIAL PRENATAL (OUTPATIENT)
Dept: OBGYN CLINIC | Facility: MEDICAL CENTER | Age: 32
End: 2022-02-07

## 2022-02-07 VITALS
DIASTOLIC BLOOD PRESSURE: 60 MMHG | WEIGHT: 128.8 LBS | BODY MASS INDEX: 21.46 KG/M2 | HEIGHT: 65 IN | SYSTOLIC BLOOD PRESSURE: 100 MMHG

## 2022-02-07 DIAGNOSIS — Z34.81 PRENATAL CARE, SUBSEQUENT PREGNANCY, FIRST TRIMESTER: Primary | ICD-10-CM

## 2022-02-07 PROCEDURE — OBC: Performed by: OBSTETRICS & GYNECOLOGY

## 2022-02-07 NOTE — PATIENT INSTRUCTIONS
Pregnancy at 11 to 14 Weeks   AMBULATORY CARE:   Changes happening to your body: You are now at the end of your first trimester and entering your second trimester  Morning sickness usually goes away by this time  You may have other symptoms such as fatigue, frequent urination, and headaches  You may have gained 2 to 4 pounds by now  Seek care immediately if:   · You have pain or cramping in your abdomen or low back  · You have heavy vaginal bleeding or clotting  · You pass material that looks like tissue or large clots  Collect the material and bring it with you  Call your doctor or obstetrician if:   · You cannot keep food or drinks down, and you are losing weight  · You have light vaginal bleeding  · You have chills or a fever  · You have vaginal itching, burning, or pain  · You have yellow, green, white, or foul-smelling vaginal discharge  · You have pain or burning when you urinate, less urine than usual, or pink or bloody urine  · You have questions or concerns about your condition or care  How to care for yourself at this stage of your pregnancy:       · Get plenty of rest   You may feel more tired than normal  You may need to take naps or go to bed earlier  · Manage nausea and vomiting  Avoid fatty and spicy foods  Eat small meals throughout the day instead of large meals  Dian may help to decrease nausea  Ask your healthcare provider about other ways of decreasing nausea and vomiting  · Eat a variety of healthy foods  Healthy foods include fruits, vegetables, whole-grain breads, low-fat dairy foods, beans, lean meats, and fish  Drink liquids as directed  Ask how much liquid to drink each day and which liquids are best for you  Limit caffeine to less than 200 milligrams each day  Limit your intake of fish to 2 servings each week  Choose fish low in mercury such as canned light tuna, shrimp, salmon, cod, or tilapia   Do not  eat fish high in mercury such as swordfish, tilefish, kesha mackerel, and shark  · Take prenatal vitamins as directed  Your need for certain vitamins and minerals, such as folic acid, increases during pregnancy  Prenatal vitamins provide some of the extra vitamins and minerals you need  Prenatal vitamins may also help to decrease the risk of certain birth defects  · Do not smoke  Smoking increases your risk of a miscarriage and other health problems during your pregnancy  Smoking can cause your baby to be born too early or weigh less at birth  Ask your healthcare provider for information if you need help quitting  · Do not drink alcohol  Alcohol passes from your body to your baby through the placenta  It can affect your baby's brain development and cause fetal alcohol syndrome (FAS)  FAS is a group of conditions that causes mental, behavior, and growth problems  · Talk to your healthcare provider before you take any medicines  Many medicines may harm your baby if you take them when you are pregnant  Do not take any medicines, vitamins, herbs, or supplements without first talking to your healthcare provider  Never use illegal or street drugs (such as marijuana or cocaine) while you are pregnant  Safety tips during pregnancy:   · Avoid hot tubs and saunas  Do not use a hot tub or sauna while you are pregnant, especially during your first trimester  Hot tubs and saunas may raise your baby's temperature and increase the risk of birth defects  · Avoid toxoplasmosis  This is an infection caused by eating raw meat or being around infected cat feces  It can cause birth defects, miscarriages, and other problems  Wash your hands after you touch raw meat  Make sure any meat is well-cooked before you eat it  Avoid raw eggs and unpasteurized milk  Use gloves or ask someone else to clean your cat's litter box while you are pregnant  Changes happening with your baby: Your baby has fully formed fingernails and toenails   Your baby's heartbeat can now be heard  Ask your healthcare provider if you can listen to your baby's heartbeat  By week 14, your baby is over 4 inches long from the top of the head to the rump (baby's bottom)  Your baby weighs over 3 ounces  Prenatal care:  Prenatal care is a series of visits with your healthcare provider throughout your pregnancy  During the first 28 weeks of your pregnancy, you will see your healthcare provider 1 time each month  Prenatal care can help prevent problems during pregnancy and childbirth  Your healthcare provider will check your blood pressure and weight  Your baby's heart rate will also be checked  You may also need the following at some visits:  · A pelvic exam  allows your healthcare provider to see your cervix (the bottom part of your uterus)  Your healthcare provider will use a speculum to open your vagina  He or she will check the size and shape of your uterus  · Blood tests  may be done to check for any of the following:    ? Gestational diabetes or anemia (low iron level)    ? Blood type or Rh factor, or certain birth defects    ? Immunity to certain diseases, such as chickenpox or rubella    ? An infection, such as a sexually transmitted infection, HIV, or hepatitis B    · Hepatitis B  may need to be prevented or treated  Hepatitis B is inflammation of the liver caused by the hepatitis B virus (HBV)  HBV can spread from a mother to her baby during delivery  You will be checked for HBV as early as possible in the first trimester of each pregnancy  You need the test even if you received the hepatitis B vaccine or were tested before  You may need to have an HBV infection treated before you give birth  · Urine tests  may also be done to check for sugar and protein  These can be signs of gestational diabetes or preeclampsia  Urine tests may also be done to check for signs of infection  · A fetal ultrasound  shows pictures of your baby inside your uterus   The pictures are used to check your baby's development, movement, and position  · Genetic disorder screening tests  may be offered to you  These tests check your baby's risk for genetic disorders such as Down syndrome  A screening test includes a blood test and ultrasound  Follow up with your doctor or obstetrician as directed:  Go to all prenatal visits  Write down your questions so you remember to ask them during your visits  © Copyright [employer] 2021 Information is for End User's use only and may not be sold, redistributed or otherwise used for commercial purposes  All illustrations and images included in CareNotes® are the copyrighted property of [employer]  or [employer]  The above information is an  only  It is not intended as medical advice for individual conditions or treatments  Talk to your doctor, nurse or pharmacist before following any medical regimen to see if it is safe and effective for you

## 2022-02-07 NOTE — PROGRESS NOTES
OB History    Para Term  AB Living   3 1 1 0 1 1   SAB IAB Ectopic Multiple Live Births         0 1      # Outcome Date GA Lbr Terrence/2nd Weight Sex Delivery Anes PTL Lv   3 Current            2 Term 20 39w3d 06:25 / 02:46 3487 g (7 lb 11 oz) F Vag-Vacuum EPI  DONAVAN   1 AB  9w0d             Birth Comments: surgically induced          * Pt presents for OB intake  *  *Pt's LMP was Patient's last menstrual period was 2021  *Ultrasound date:2022   10weeks 1days  *Estimated date of delivery: 2022   * confirmed by LMP    *Signs/Symptoms of Pregnancy   *YES Constipation    *NO Headaches   *NO Cramping  *NO Spotting    *NO PICA cravings    Diabetes     *YES Hx of GDM    *NO BMI >35    *NO First degree relative with type 2 diabetes      Hypertension-    *NO Hx of chronic HTN    *NO Hx of gestational HTN   *NO hx of preeclampsia, eclampsia, or HELLP syndrome     *Infection Screening   *NO Does the pt have a hx of MRSA? *NO History of herpes? *NO History of COVID? *Immunizations:   *YES Discussed influenza vaccine   *YES Discussed TDaP vaccine   *YES COVID Vaccine       ACTIVE MEDICAL/MENTAL HEALTH CONDITIONS  Autoimmune Disease *n  Asthma: *n   Cardiac Disease *n  Chronic HTN, Pregestational  *n  Hepatitis  *n     treated: *n  Thalassemia Alpha*n  Beta *n  Renal Disease *n  Sickle Cell Ds    Trait *n Disease *n  Depression *yes   Anxiety*yes  Medications*yes- zoloft      *Interview education   *Handouts given:    *Baby and Me support center     *Melanie sign up instructions    *Lab Locations    *[facility] Pediatricians List/Choosing Pediatrician Sheet    *[facility] Childbirth [facility] Parenting Classes    *Schedule for Prenatal Visits    *Pregnancy Warning Signs Reviewed    *Safe Medications During Pregnancy    *SMA and CF Testing information sheet    *CPT Code Sheet/MFM 13week NT pamphlet    *[employer] SBIRT Screen:  Depression Screening Follow-up Plan: Patient's depression screening was negative with an [place] score of  5        *[facility]   *yes discussed genetic testing- pt interested     Deon Gomez is aware to schedule US and genetic testing with [facility]             Patient has been informed of basic prenatal advice such as avoiding alcohol, drugs, and smoking  She should remain hydrated and take daily prenatal vitamins  Patient should avoid caffeine, raw sprouts, high mercury fish, undercooked fish, raw eggs, organ meat, unwashed produce, and unpasteurized cheeses, milk, and fruit juice and undercooked meats  She has been informed about toxoplasmosis and to avoid cat feces  *Details that I feel the provider should be aware of:  Deon Gomez was seen for her ob intake at 10w5d  She has no complaints at this visit  Deon Gomez has a history of insulin controlled GDM with her previous pregnancy  Previous delivery was a VAVD  She is hopeful this delivery will not require the same  Deon Gomez has a history of anxiety and depression which she sees a therapist monthly and a psychiatrist every 2 months for  She is currently on zoloft  Prenatal panel and early 1 hr gtt ordered  MFM referral placed  PN1 visit scheduled for *02/17/2022  The patient was oriented to our practice and all questions were answered      Interviewed by:  Joseluis Dave

## 2022-02-10 ENCOUNTER — TELEMEDICINE (OUTPATIENT)
Dept: BEHAVIORAL/MENTAL HEALTH CLINIC | Facility: CLINIC | Age: 32
End: 2022-02-10
Payer: COMMERCIAL

## 2022-02-10 DIAGNOSIS — F41.1 GAD (GENERALIZED ANXIETY DISORDER): ICD-10-CM

## 2022-02-10 DIAGNOSIS — F32.0 CURRENT MILD EPISODE OF MAJOR DEPRESSIVE DISORDER, UNSPECIFIED WHETHER RECURRENT (HCC): Primary | ICD-10-CM

## 2022-02-10 PROCEDURE — 90834 PSYTX W PT 45 MINUTES: CPT | Performed by: SOCIAL WORKER

## 2022-02-10 NOTE — PSYCH
This note was not shared with the patient due to this is a psychotherapy note      Virtual Regular Visit    Verification of patient location:    Patient is located in the following state in which I hold an active license [place]      Assessment/Plan:    Problem List Items Addressed This Visit        Other    Depression - Primary    PEPE (generalized anxiety disorder)          Goals addressed in session: Goal 1          Reason for visit is   Chief Complaint   Patient presents with    Virtual Regular Visit        Encounter provider [facility]    Provider located at [address] [place] [address]  [facility] [address] [phone]    The patient was identified by name and date of birth  Richard Rodriguez was informed that this is a telemedicine visit and that the visit is being conducted throughEpic Embedded and patient was informed this is a secure, HIPAA-complaint platform  She agrees to proceed     My office door was closed  No one else was in the room  She acknowledged consent and understanding of privacy and security of the video platform  The patient has agreed to participate and understands they can discontinue the visit at any time  Patient is aware this is a billable service  Subjective  Richard Rodriguez is a 33 y o  female   Psychotherapy Provided: Individual Psychotherapy 40 minutes     Length of time in session: 40 minutes, follow up in 1 month    Goals addressed in session: Goal 1     Pain:      none    0    Current suicide risk : Low       Met with Archana Couch states, "I've been alright "   Karlene Robles stopped breastfeeding her daughter 16 days ago, she's proud of herself, describing this a 'big achievement "  She says that she hates being pregnant; however, is getting excited for the baby  Karlene Robles stopped taking her Buspar, reports no change being off medication  She continues to take her Zoloft daily      She reports no complaints about her anxiety at this time, reports feeling less anxious off Buspar  Session focused on Archana describing her worry about her brother, who began having an increase in mental health symptoms the last few weeks  She reports her brother's mental health being a trigger to her anxiety in the past, fearing her brother will die with his poor choices  Yesenia Esquivel expressed her thoughts and feelings about this  Validation and support was provided  She is practicing boundaries- improved awareness and insight into responsibility to self vs responsibilities to others  She's practicing radical acceptance, looking at the situation more WISE minded, less emotionally reactive  Through the use of skills, Yesenia Esquivel reports an absence of obsessive thinking- saying this is something she struggled with in the past with her brother's mental health and substance abuse and she's no longer 'searching for a solution '    Yesenia Esquivel will continue to implement wellness tools  Continue monthly support  Information provided on [facility] family support groups and [place]  She was encouraged to call the office if she feels she needs a sooner appt       Mental status:  Appearance calm and cooperative , adequate hygiene and grooming and good eye contact    Mood low   Affect affect was constricted   Speech a normal rate and volume   Thought Processes coherent/organized and normal thought processes   Hallucinations no hallucinations present    Thought Content no delusions, worry   Abnormal Thoughts no suicidal thoughts  and no homicidal thoughts    Orientation  oriented to person and place and time   Remote Memory short term memory intact and long term memory intact   Attention Span concentration intact   Intellect Appears to be of Average Intelligence   Fund of Knowledge displays adequate knowledge of current events   Insight fair   Judgement fair   Muscle Strength Muscle strength and tone were normal and Normal gait    Language no difficulty naming common objects   Pain none   Pain Scale 0     Behavioral Health Treatment Plan [facility]: Diagnosis and Treatment Plan explained to Rosie Franco relates understanding diagnosis and is agreeable to Treatment Plan  Yes       I spent 40 minutes directly with the patient during this visit    VIRTUAL VISIT DISCLAIMER    Yoan Garzajose verbally agrees to participate in [employer]  Pt is aware that [employer] could be limited without vital signs or the ability to perform a full hands-on physical Mindy Kenney understands she or the provider may request at any time to terminate the video visit and request the patient to seek care or treatment in person

## 2022-02-12 ENCOUNTER — APPOINTMENT (OUTPATIENT)
Dept: LAB | Facility: HOSPITAL | Age: 32
End: 2022-02-12
Attending: OBSTETRICS & GYNECOLOGY
Payer: COMMERCIAL

## 2022-02-12 DIAGNOSIS — Z34.81 PRENATAL CARE, SUBSEQUENT PREGNANCY, FIRST TRIMESTER: ICD-10-CM

## 2022-02-12 LAB
ABO GROUP BLD: NORMAL
BASOPHILS # BLD AUTO: 0.04 THOUSANDS/ΜL (ref 0–0.1)
BASOPHILS NFR BLD AUTO: 1 % (ref 0–1)
BILIRUB UR QL STRIP: NEGATIVE
BLD GP AB SCN SERPL QL: NEGATIVE
CLARITY UR: CLEAR
COLOR UR: YELLOW
EOSINOPHIL # BLD AUTO: 0.16 THOUSAND/ΜL (ref 0–0.61)
EOSINOPHIL NFR BLD AUTO: 2 % (ref 0–6)
ERYTHROCYTE [DISTWIDTH] IN BLOOD BY AUTOMATED COUNT: 12.7 % (ref 11.6–15.1)
GLUCOSE 1H P 50 G GLC PO SERPL-MCNC: 123 MG/DL (ref 40–134)
GLUCOSE UR STRIP-MCNC: NEGATIVE MG/DL
HBV SURFACE AG SER QL: NORMAL
HCT VFR BLD AUTO: 38.7 % (ref 34.8–46.1)
HGB BLD-MCNC: 13.1 G/DL (ref 11.5–15.4)
HGB UR QL STRIP.AUTO: NEGATIVE
IMM GRANULOCYTES # BLD AUTO: 0.02 THOUSAND/UL (ref 0–0.2)
IMM GRANULOCYTES NFR BLD AUTO: 0 % (ref 0–2)
KETONES UR STRIP-MCNC: NEGATIVE MG/DL
LEUKOCYTE ESTERASE UR QL STRIP: NEGATIVE
LYMPHOCYTES # BLD AUTO: 1.96 THOUSANDS/ΜL (ref 0.6–4.47)
LYMPHOCYTES NFR BLD AUTO: 27 % (ref 14–44)
MCH RBC QN AUTO: 32 PG (ref 26.8–34.3)
MCHC RBC AUTO-ENTMCNC: 33.9 G/DL (ref 31.4–37.4)
MCV RBC AUTO: 95 FL (ref 82–98)
MONOCYTES # BLD AUTO: 0.43 THOUSAND/ΜL (ref 0.17–1.22)
MONOCYTES NFR BLD AUTO: 6 % (ref 4–12)
NEUTROPHILS # BLD AUTO: 4.68 THOUSANDS/ΜL (ref 1.85–7.62)
NEUTS SEG NFR BLD AUTO: 64 % (ref 43–75)
NITRITE UR QL STRIP: NEGATIVE
NRBC BLD AUTO-RTO: 0 /100 WBCS
PH UR STRIP.AUTO: 6 [PH]
PLATELET # BLD AUTO: 310 THOUSANDS/UL (ref 149–390)
PMV BLD AUTO: 9.8 FL (ref 8.9–12.7)
PROT UR STRIP-MCNC: NEGATIVE MG/DL
RBC # BLD AUTO: 4.09 MILLION/UL (ref 3.81–5.12)
RH BLD: POSITIVE
RUBV IGG SERPL IA-ACNC: 87.3 IU/ML
SP GR UR STRIP.AUTO: 1.02 (ref 1–1.03)
SPECIMEN EXPIRATION DATE: NORMAL
UROBILINOGEN UR QL STRIP.AUTO: 0.2 E.U./DL
WBC # BLD AUTO: 7.29 THOUSAND/UL (ref 4.31–10.16)

## 2022-02-12 PROCEDURE — 36415 COLL VENOUS BLD VENIPUNCTURE: CPT

## 2022-02-12 PROCEDURE — 81003 URINALYSIS AUTO W/O SCOPE: CPT

## 2022-02-12 PROCEDURE — 87086 URINE CULTURE/COLONY COUNT: CPT

## 2022-02-12 PROCEDURE — 82950 GLUCOSE TEST: CPT

## 2022-02-12 PROCEDURE — 80081 OBSTETRIC PANEL INC HIV TSTG: CPT

## 2022-02-13 LAB — BACTERIA UR CULT: NORMAL

## 2022-02-14 LAB
HIV 1+2 AB+HIV1 P24 AG SERPL QL IA: NORMAL
RPR SER QL: NORMAL

## 2022-02-16 ENCOUNTER — TELEPHONE (OUTPATIENT)
Dept: PERINATAL CARE | Facility: CLINIC | Age: 32
End: 2022-02-16

## 2022-02-16 NOTE — TELEPHONE ENCOUNTER
Karlene Robles left a VMM on the nurse line inquiring about NIPT for her upcoming NT  appointment  Karlene Robles stated that she is an [employer] employee and her [employer] doesn't cover NIPT testing and she would like to get the sequential screen instead  RN returned call back and left VMM for Karlene Robles, information was provided on [employer] lab  deductible of $250 00, if met then the NIPT would be coveraged  Instructed her to view her NT video that was sent to her My -Chart to obtain the CPT codes, if needed to call her insurance company again  Furthermore, informed  Karlene Robles that the [facility] doctors will review NIPT testing options  at her NT appointment on 3/1/22  Instructed to call the nurse line if she has  further questions

## 2022-02-16 NOTE — TELEPHONE ENCOUNTER
PT called our office again and stated she wants sequential Screen drawn and would like it drawn asap  Pt informed that she has an appointment in her appropriate date range for her NT scan (as per due date pt provided me)  Pt informed that blood work can NOT be done for sequential Screen prior to the ultrasound appointment as measurements from that scan are used to calculate the risk calculations  Pt also informed that it is not a guarantee as the size of the baby may place her out of the date range of the testing  PT very frustrated by this information and needed repetition of information  At conclusion of appointment, pt declined further needs

## 2022-02-17 ENCOUNTER — INITIAL PRENATAL (OUTPATIENT)
Dept: OBGYN CLINIC | Facility: MEDICAL CENTER | Age: 32
End: 2022-02-17

## 2022-02-17 VITALS — WEIGHT: 130.1 LBS | BODY MASS INDEX: 21.65 KG/M2 | SYSTOLIC BLOOD PRESSURE: 102 MMHG | DIASTOLIC BLOOD PRESSURE: 66 MMHG

## 2022-02-17 DIAGNOSIS — F41.1 GAD (GENERALIZED ANXIETY DISORDER): ICD-10-CM

## 2022-02-17 DIAGNOSIS — Z34.91 FIRST TRIMESTER PREGNANCY: Primary | ICD-10-CM

## 2022-02-17 DIAGNOSIS — Z86.32 HISTORY OF INSULIN CONTROLLED GESTATIONAL DIABETES MELLITUS (GDM): ICD-10-CM

## 2022-02-17 DIAGNOSIS — Z3A.12 12 WEEKS GESTATION OF PREGNANCY: ICD-10-CM

## 2022-02-17 PROCEDURE — PNV: Performed by: ADVANCED PRACTICE MIDWIFE

## 2022-02-17 PROCEDURE — 87491 CHLMYD TRACH DNA AMP PROBE: CPT | Performed by: ADVANCED PRACTICE MIDWIFE

## 2022-02-17 PROCEDURE — 87591 N.GONORRHOEAE DNA AMP PROB: CPT | Performed by: ADVANCED PRACTICE MIDWIFE

## 2022-02-17 NOTE — PATIENT INSTRUCTIONS
Pregnancy at 11 to 14 Weeks   AMBULATORY CARE:   Changes happening to your body: You are now at the end of your first trimester and entering your second trimester  Morning sickness usually goes away by this time  You may have other symptoms such as fatigue, frequent urination, and headaches  You may have gained 2 to 4 pounds by now  Seek care immediately if:   · You have pain or cramping in your abdomen or low back  · You have heavy vaginal bleeding or clotting  · You pass material that looks like tissue or large clots  Collect the material and bring it with you  Call your doctor or obstetrician if:   · You cannot keep food or drinks down, and you are losing weight  · You have light vaginal bleeding  · You have chills or a fever  · You have vaginal itching, burning, or pain  · You have yellow, green, white, or foul-smelling vaginal discharge  · You have pain or burning when you urinate, less urine than usual, or pink or bloody urine  · You have questions or concerns about your condition or care  How to care for yourself at this stage of your pregnancy:       · Get plenty of rest   You may feel more tired than normal  You may need to take naps or go to bed earlier  · Manage nausea and vomiting  Avoid fatty and spicy foods  Eat small meals throughout the day instead of large meals  Dian may help to decrease nausea  Ask your healthcare provider about other ways of decreasing nausea and vomiting  · Eat a variety of healthy foods  Healthy foods include fruits, vegetables, whole-grain breads, low-fat dairy foods, beans, lean meats, and fish  Drink liquids as directed  Ask how much liquid to drink each day and which liquids are best for you  Limit caffeine to less than 200 milligrams each day  Limit your intake of fish to 2 servings each week  Choose fish low in mercury such as canned light tuna, shrimp, salmon, cod, or tilapia   Do not  eat fish high in mercury such as swordfish, tilefish, kesha mackerel, and shark  · Take prenatal vitamins as directed  Your need for certain vitamins and minerals, such as folic acid, increases during pregnancy  Prenatal vitamins provide some of the extra vitamins and minerals you need  Prenatal vitamins may also help to decrease the risk of certain birth defects  · Do not smoke  Smoking increases your risk of a miscarriage and other health problems during your pregnancy  Smoking can cause your baby to be born too early or weigh less at birth  Ask your healthcare provider for information if you need help quitting  · Do not drink alcohol  Alcohol passes from your body to your baby through the placenta  It can affect your baby's brain development and cause fetal alcohol syndrome (FAS)  FAS is a group of conditions that causes mental, behavior, and growth problems  · Talk to your healthcare provider before you take any medicines  Many medicines may harm your baby if you take them when you are pregnant  Do not take any medicines, vitamins, herbs, or supplements without first talking to your healthcare provider  Never use illegal or street drugs (such as marijuana or cocaine) while you are pregnant  Safety tips during pregnancy:   · Avoid hot tubs and saunas  Do not use a hot tub or sauna while you are pregnant, especially during your first trimester  Hot tubs and saunas may raise your baby's temperature and increase the risk of birth defects  · Avoid toxoplasmosis  This is an infection caused by eating raw meat or being around infected cat feces  It can cause birth defects, miscarriages, and other problems  Wash your hands after you touch raw meat  Make sure any meat is well-cooked before you eat it  Avoid raw eggs and unpasteurized milk  Use gloves or ask someone else to clean your cat's litter box while you are pregnant  Changes happening with your baby: Your baby has fully formed fingernails and toenails   Your baby's heartbeat can now be heard  Ask your healthcare provider if you can listen to your baby's heartbeat  By week 14, your baby is over 4 inches long from the top of the head to the rump (baby's bottom)  Your baby weighs over 3 ounces  Prenatal care:  Prenatal care is a series of visits with your healthcare provider throughout your pregnancy  During the first 28 weeks of your pregnancy, you will see your healthcare provider 1 time each month  Prenatal care can help prevent problems during pregnancy and childbirth  Your healthcare provider will check your blood pressure and weight  Your baby's heart rate will also be checked  You may also need the following at some visits:  · A pelvic exam  allows your healthcare provider to see your cervix (the bottom part of your uterus)  Your healthcare provider will use a speculum to open your vagina  He or she will check the size and shape of your uterus  · Blood tests  may be done to check for any of the following:    ? Gestational diabetes or anemia (low iron level)    ? Blood type or Rh factor, or certain birth defects    ? Immunity to certain diseases, such as chickenpox or rubella    ? An infection, such as a sexually transmitted infection, HIV, or hepatitis B    · Hepatitis B  may need to be prevented or treated  Hepatitis B is inflammation of the liver caused by the hepatitis B virus (HBV)  HBV can spread from a mother to her baby during delivery  You will be checked for HBV as early as possible in the first trimester of each pregnancy  You need the test even if you received the hepatitis B vaccine or were tested before  You may need to have an HBV infection treated before you give birth  · Urine tests  may also be done to check for sugar and protein  These can be signs of gestational diabetes or preeclampsia  Urine tests may also be done to check for signs of infection  · A fetal ultrasound  shows pictures of your baby inside your uterus   The pictures are used to check your baby's development, movement, and position  · Genetic disorder screening tests  may be offered to you  These tests check your baby's risk for genetic disorders such as Down syndrome  A screening test includes a blood test and ultrasound  Follow up with your doctor or obstetrician as directed:  Go to all prenatal visits  Write down your questions so you remember to ask them during your visits  © Copyright [employer] 2021 Information is for End User's use only and may not be sold, redistributed or otherwise used for commercial purposes  All illustrations and images included in CareNotes® are the copyrighted property of [employer]  or [address] [place], [place] [address]henry Roberts   The above information is an  only  It is not intended as medical advice for individual conditions or treatments  Talk to your doctor, nurse or pharmacist before following any medical regimen to see if it is safe and effective for you

## 2022-02-17 NOTE — PROGRESS NOTES
Initial Prenatal Visit  [facility] [address] [place] [address]    Assessment/Plan:  Geraldo Lopez is a 33y o  year old  at 12w1d who presents for initial prenatal visit  Supervision of normal pregnancy  - Prenatal labs reviewed and normal   Blood type: A positive  - Aneuploidy screening discussed  Patient opts for sequential aneuploidy screening   - Routine cervical cancer screening: Pap - Desires to wait until Post partum  - Routine STI Screening: GC/Chlamydia sent today  HIV/Hep B/Syphilis ordered in prenatal panel   - Patient Education: Patient was counseled regarding diet, exercise, weight gain, foods to avoid, vaccines in pregnancy, aneuploidy screening, travel precautions to include seat belt use and VTE risk reduction  She has been provided our pregnancy packet which includes how and when to contact providers, medication recommendations, dietary suggestions, breastfeeding information as well as websites for additional information, hospital and delivery concerns  Additional Pregnancy Problems:   1  First trimester pregnancy  -     Chlamydia/GC amplified DNA by PCR    2  History of insulin controlled gestational diabetes mellitus (GDM)    3  PEPE (generalized anxiety disorder)  Assessment & Plan:  Zoloft 25 mg daily      4  12 weeks gestation of pregnancy        Subjective:   CC:  Desires prenatal care  Sujey Jaramillo is a 33 y o   female who presents for prenatal care  Pregnancy ROS: No leakage of fluid, pelvic pain, or vaginal bleeding  Resolved nausea/vomiting  Treated with Monistat 3 day last Friday and then felt itchy earlier this week, but is not feeling better  Notes that she is getting a good selection of foods  Working on maintaining fluids  Desires to wait until post partum for pap smear    Has questions regarding previous delivery of daughter- with vacuum assist       The following portions of the patient's history were reviewed and updated as appropriate: allergies, current medications, past family history, past medical history, obstetric history, gynecologic history, past social history, past surgical history and problem list       Objective:  /66   Wt 59 kg (130 lb 1 6 oz)   LMP 2021   BMI 21 65 kg/m²   Pregravid Weight/BMI: Pregravid weight not on file (BMI Could not be calculated)  Current Weight: 59 kg (130 lb 1 6 oz)   Total Weight Gain: Not found  Pre- Vitals      Most Recent Value   Prenatal Assessment    Fetal Heart Rate 156   Prenatal Vitals    Blood Pressure 102/66   Weight - Scale 59 kg (130 lb 1 6 oz)   Urine Albumin/Glucose    Dilation/Effacement/Station    Vaginal Drainage    Edema    LLE Edema None   RLE Edema None         General: Well appearing, no distress  Respiratory: Normal respiratory rate, lungs clear to auscultation, no wheezing or rales  Cardiovascular: Regular rate and rhythm, no murmurs, rubs, or gallops  Breasts: Normal bilaterally, nontender without masses, asymmetry, or nipple discharge  Abdomen: Soft, gravid, nontender  : Urethra normal  Normal labia majora and minora  Vagina normal   No vaginal bleeding  No vaginal discharge  Cervix visually closed  Extremities: Warm and well perfused  Non tender  No edema

## 2022-02-18 LAB
C TRACH DNA SPEC QL NAA+PROBE: NEGATIVE
N GONORRHOEA DNA SPEC QL NAA+PROBE: NEGATIVE

## 2022-02-21 PROBLEM — F32.A DEPRESSION: Status: RESOLVED | Noted: 2020-01-28 | Resolved: 2022-02-21

## 2022-02-21 PROBLEM — O24.414 INSULIN CONTROLLED GESTATIONAL DIABETES MELLITUS (GDM) IN THIRD TRIMESTER: Status: RESOLVED | Noted: 2019-11-13 | Resolved: 2022-02-21

## 2022-02-21 PROBLEM — Z86.32 HISTORY OF INSULIN CONTROLLED GESTATIONAL DIABETES MELLITUS (GDM): Status: ACTIVE | Noted: 2022-02-21

## 2022-02-21 PROBLEM — Z3A.12 12 WEEKS GESTATION OF PREGNANCY: Status: ACTIVE | Noted: 2022-02-21

## 2022-03-01 ENCOUNTER — ROUTINE PRENATAL (OUTPATIENT)
Dept: PERINATAL CARE | Facility: OTHER | Age: 32
End: 2022-03-01
Payer: COMMERCIAL

## 2022-03-01 VITALS
BODY MASS INDEX: 22.02 KG/M2 | DIASTOLIC BLOOD PRESSURE: 71 MMHG | SYSTOLIC BLOOD PRESSURE: 112 MMHG | HEART RATE: 74 BPM | WEIGHT: 132.2 LBS | HEIGHT: 65 IN

## 2022-03-01 DIAGNOSIS — Z36.82 ENCOUNTER FOR ANTENATAL SCREENING FOR NUCHAL TRANSLUCENCY: Primary | ICD-10-CM

## 2022-03-01 DIAGNOSIS — Z3A.13 13 WEEKS GESTATION OF PREGNANCY: ICD-10-CM

## 2022-03-01 DIAGNOSIS — Z86.32 HISTORY OF INSULIN CONTROLLED GESTATIONAL DIABETES MELLITUS (GDM): ICD-10-CM

## 2022-03-01 PROCEDURE — 99242 OFF/OP CONSLTJ NEW/EST SF 20: CPT | Performed by: OBSTETRICS & GYNECOLOGY

## 2022-03-01 PROCEDURE — 76813 OB US NUCHAL MEAS 1 GEST: CPT | Performed by: OBSTETRICS & GYNECOLOGY

## 2022-03-01 NOTE — PROGRESS NOTES
Patient chose to have Part 1 Sequential Screening from [employer]  Finger stick specimen collected from right middle finger and patient tolerated procedure well  Sample mailed to [employer] via [employer]  Explained results will be available in 7-10 business days  [facility] office will call her with results or she can view in [address]  [place] collection for Part 2 is between 16-18 weeks gestation, a lab slip and instruction letter will be mailed from our office  Patient instructed to take the paper lab slip to lab, this specialty genetic test is not yet in Epic  Patient verbalized understanding of all instructions

## 2022-03-01 NOTE — LETTER
2022    Claude Solis MD  [address]    Patient: Ahsan Angel   YOB: 1990   Date of Visit: 3/1/2022   Gestational age 14w11d   Deidra Yovani of this communication: Routine       Dear Eddie Servin,    This patient was seen recently in our  office  The content of my evaluation today is in the ultrasound report under "OB Procedures" tab  Please don't hesitate to contact our office with any concerns or questions       Sincerely,      Chanda Dasilva MD  Attending Physician, Ramesh

## 2022-03-01 NOTE — PROGRESS NOTES
[address]: Ms Theodore Zavala was seen today for nuchal translucency ultrasound  See ultrasound report under "OB Procedures" tab  Review of Systems   Constitutional: Negative for chills, fever and unexpected weight change  HENT: Negative for congestion, dental problem, facial swelling and sore throat  Eyes: Negative for visual disturbance  Respiratory: Negative for cough and shortness of breath  Cardiovascular: Negative for chest pain and palpitations  Gastrointestinal: Negative for diarrhea and vomiting  Endocrine: Negative for polydipsia  Genitourinary: Negative for dysuria and vaginal bleeding  Yeast infection currently being treated   Musculoskeletal: Negative for back pain and joint swelling  Skin: Negative for rash and wound  Allergic/Immunologic: Negative for immunocompromised state  Neurological: Negative for seizures and headaches  Hematological: Does not bruise/bleed easily  Psychiatric/Behavioral: Negative for hallucinations and suicidal ideas  Physical Exam  Constitutional:       General: She is not in acute distress  Appearance: Normal appearance  She is not ill-appearing, toxic-appearing or diaphoretic  HENT:      Head: Normocephalic and atraumatic  Nose: No congestion or rhinorrhea  Eyes:      General: No scleral icterus  Right eye: No discharge  Left eye: No discharge  Extraocular Movements: Extraocular movements intact  Conjunctiva/sclera: Conjunctivae normal    Pulmonary:      Effort: Pulmonary effort is normal  No respiratory distress  Musculoskeletal:      Cervical back: Normal range of motion  Skin:     Coloration: Skin is not jaundiced or pale  Findings: No erythema, lesion or rash  Neurological:      General: No focal deficit present  Mental Status: She is alert and oriented to person, place, and time     Psychiatric:         Mood and Affect: Mood normal          Behavior: Behavior normal          Please don't hesitate to contact our office with any concerns or questions    Coral Schneider MD

## 2022-03-01 NOTE — PATIENT INSTRUCTIONS
Thank you for choosing us for your  care today  If you have any questions about your ultrasound or care, please do not hesitate to contact us or your primary obstetrician  Some general instructions for your pregnancy are:     Protect against coronavirus: get vaccinated - pregnant women are increased risk of severe COVID  Notify your primary care doctor if you have any symptoms   Exercise: Aim for 22 minutes per day (150 minutes per week) of regular exercise  Walking is great!  Nutrition: aim for calcium-rich and iron-rich foods as well as healthy sources of protein   Learn about Preeclampsia: preeclampsia is a common, serious high blood pressure complication in pregnancy  A blood pressure of 699ZCYK (systolic or top number) or 28BLFL (diastolic or bottom number) is not normal and needs evaluation by your doctor  Aspirin is sometimes prescribed in early pregnancy to prevent preeclampsia in women with risk factors - ask your obstetrician if you should be on this medication   If you smoke, try to reduce how many cigarettes you smoke or try to quit completely  Do not vape   Other warning signs to watch out for in pregnancy or postpartum: chest pain, obstructed breathing or shortness of breath, seizures, thoughts of hurting yourself or your baby, bleeding, a painful or swollen leg, fever, or headache (see [facility] POST-BIRTH Warning Signs campaign)  If these happen call 911  Itching is also not normal in pregnancy and if you experience this, especially over your hands and feet, potentially worse at night, notify your doctors

## 2022-03-03 ENCOUNTER — DOCUMENTATION (OUTPATIENT)
Dept: PERINATAL CARE | Facility: CLINIC | Age: 32
End: 2022-03-03

## 2022-03-03 NOTE — PROGRESS NOTES
Brian Carias from [employer] left a VMM on the nurse line requesting NT and CRL information on pt's Sequential Screen  Returned call back and spoke with William Haddad from [employer], provided NT of 1 60 mm and CRL 7 2 Cm as per Dr Holley Nieves 's report  No further assistance was need from [employer]

## 2022-03-07 ENCOUNTER — TELEPHONE (OUTPATIENT)
Dept: PERINATAL CARE | Facility: OTHER | Age: 32
End: 2022-03-07

## 2022-03-07 NOTE — TELEPHONE ENCOUNTER
Left voice mail message at number provided on communication consent  Result of [facility] Part 1 Sequential Screen given and Part 2 instructions explained  Patient to call MFM nurse line for any questions, number provided  #[phone]  TRF and lab instruction letter mailed

## 2022-03-07 NOTE — TELEPHONE ENCOUNTER
----- Message from Nevin Jansen MD sent at 3/5/2022  8:26 PM EST -----  Hi  RN staff, I've reviewed this Sequential Part 1 result which shows low preliminary risk for the conditions tested (trisomies 21 and 18), can you call her to inform her of this result if she has not viewed her result via [employer]? Please also mail her the requisition form for the Sequential Screen Part 2  Thank you    Nevin Jansen MD

## 2022-03-07 NOTE — LETTER
03/07/22  Dariel Balderas  1990    Thank you for completing Part 1 of your Sequential Screen  To obtain a complete test result, complete blood work for Part 2 Sequential Screen between the weeks of 3/21/22 to 4/4/22  Please verify which laboratory is In Network with your insurance plan ([facility] or [facility])      If you choose to use a [facility] lab, please go to a location from this list:     [address]  [address] [facility], [facility] [address]                [address], [place] [address] [facility]  [address] [facility], [facility] [address]                    [address], [facility] [address] [facility]  [address], [place], [address] [facility]             [address], [address]  [address], [facility] [address]             [address] [facility] [address]  [address] [facility] [address]              [facility], [address] [facility] [address] [facility]  [address], [facility] [address]                        [address], [facility] [address] [facility]  [address], [address] [facility] [address]            [address] [facility] [address], [facility] [address] [facility]                            [address], [facility] [address]    For list of [facility] [employer]   If you choose [facility], please remind the phlebotomist the screen is ordered for [facility]  You can take this letter with you to the lab  Call Maternal Fetal Medicine nurse line any questions at [phone]     Thank you,  [facility] Staff

## 2022-03-10 ENCOUNTER — TELEMEDICINE (OUTPATIENT)
Dept: BEHAVIORAL/MENTAL HEALTH CLINIC | Facility: CLINIC | Age: 32
End: 2022-03-10
Payer: COMMERCIAL

## 2022-03-10 DIAGNOSIS — F33.0 MILD EPISODE OF RECURRENT MAJOR DEPRESSIVE DISORDER (HCC): Primary | ICD-10-CM

## 2022-03-10 DIAGNOSIS — F41.1 GAD (GENERALIZED ANXIETY DISORDER): ICD-10-CM

## 2022-03-10 PROCEDURE — 90834 PSYTX W PT 45 MINUTES: CPT | Performed by: SOCIAL WORKER

## 2022-03-10 NOTE — PSYCH
This note was not shared with the patient due to this is a psychotherapy note      Virtual Regular Visit    Verification of patient location:    Patient is located in the following state in which I hold an active license [place]      Assessment/Plan:    Problem List Items Addressed This Visit        Other    PEPE (generalized anxiety disorder)    Mild episode of recurrent major depressive disorder ([address] ) - Primary          Goals addressed in session: Goal 1          Reason for visit is   Chief Complaint   Patient presents with    Virtual Regular Visit        Encounter provider [facility]    Provider located at [address] [place] [id number] [phone]    The patient was identified by name and date of birth  Ashely Silver was informed that this is a telemedicine visit and that the visit is being conducted throughEpic Embedded and patient was informed this is a secure, HIPAA-complaint platform  She agrees to proceed     My office door was closed  No one else was in the room  She acknowledged consent and understanding of privacy and security of the video platform  The patient has agreed to participate and understands they can discontinue the visit at any time  Patient is aware this is a billable service  Subjective  Ashely Silver is a 33 y o  female   Met with Lola Matthews is now 12 weeks pregnant  She reports no issues in her pregnancy and is looking forward to learning the sex of the baby in the next few weeks  Her brother was hospitalized due to pancreatitis  He was in the ICU for 3 weeks and was transferred today to a rehab  Session focused on Archana processing her emotions about her brother and his care  Archana expressed frustration with her brother's mental health and alcoholism  She told the story of several interactions with her brother which made her upset    She was encouraged to look at how she interacted with her brother (negativistic about his ability to stay in recovery) vs her brother's responses to her  We discussed the strain mental health and addiction have on a family  EZEKIEL and Haider Leong information was again provided  Archana expressed frustration that her brother found a doctor to prescribe him Klonopin but she can't get Ativan from her OP psychiatrist   Primarily venting about this  She was asked about her anxiety at this time; in which she expressed no concerns; but fears she may 'just need to suffer' if she has a panic attack  She was challenged to remain in the present moment throughout session with her brother being 3 weeks sober and going to rehab and Eryn Garvin reporting no complaints with her anxiety  Eryn Garvin will continue monthly support at this time  Mental status:  Appearance adequate hygiene and grooming and good eye contact    Mood irritable   Affect Mood congruent   Speech a normal rate and volume   Thought Processes coherent/organized and normal thought processes   Hallucinations no hallucinations present    Thought Content no delusions, negativistic   Abnormal Thoughts no suicidal thoughts  and no homicidal thoughts    Orientation  oriented to person and place and time   Remote Memory short term memory intact and long term memory intact   Attention Span concentration intact   Intellect Appears to be of Average Intelligence   Fund of Knowledge displays adequate knowledge of current events   Insight partial   Judgement partial   Muscle Strength Unable to assess due to virtual session   Language no difficulty naming common objects   Pain none   Pain Scale 0         I spent 40 minutes directly with the patient during this visit    VIRTUAL VISIT DISCLAIMER    Kerry Manriquez verbally agrees to participate in [employer]   Pt is aware that [employer] could be limited without vital signs or the ability to perform a full hands-on physical Pervis Primrose understands she or the provider may request at any time to terminate the video visit and request the patient to seek care or treatment in person

## 2022-03-11 PROBLEM — F33.0 MILD EPISODE OF RECURRENT MAJOR DEPRESSIVE DISORDER (HCC): Status: ACTIVE | Noted: 2022-03-11

## 2022-03-17 PROBLEM — Z3A.16 16 WEEKS GESTATION OF PREGNANCY: Status: ACTIVE | Noted: 2022-02-21

## 2022-03-17 NOTE — PATIENT INSTRUCTIONS
Thank you for visiting [facility]  You may be invited to complete a survey about you visit  Your responses will help us improve care we provide  A 10 means the care you received at your visit met your expectations  If you are unable to give a 10 please list reasons so we can work on improving your patient experience  COVID-19 and Pregnancy   AMBULATORY CARE:   What you need to know about coronavirus disease 2019 (COVID-19) and pregnancy:  Pregnancy increases your risk for severe COVID-19 illness  COVID-19 can also lead to  delivery of your baby  Most babies who become infected with the new virus do not develop serious effects, but some do  It is important for you and your baby to stay safe during pregnancy and delivery  Signs and symptoms of COVID-19 in newborns: The following signs and symptoms may be from COVID-19, but they are also common in newborns  Your 's healthcare provider may recommend testing to confirm or rule out COVID-19  Your  may need a second test if the first is negative  · Fever    · Not moving arms or legs much, or being too sleepy to feed    · A runny nose or cough    · Fast breathing, or trouble breathing    · Vomiting, diarrhea, or not feeding well    Call your local emergency number (911 in the [address]) if:   · You have trouble breathing or shortness of breath at rest     · You have chest pain or pressure that lasts longer than 5 minutes  · You become confused or hard to wake  · Your lips or face are blue  Seek care immediately if:   · You have a fever of 104°F (40°C) or higher  Call your doctor if:   · You have symptoms of COVID-19  · You have questions or concerns about your condition or care  How the 2019 coronavirus spreads: The virus spreads quickly and easily  The virus can be passed starting 2 to 3 days before symptoms begin or before a positive test if symptoms never begin    · Droplets are the main way all coronaviruses spread  The virus travels in droplets that form when a person talks, sings, coughs, or sneezes  The droplets can also float in the air for minutes or hours  Infection happens when you breathe in the droplets or get them in your eyes or nose  Close personal contact with an infected person increases your risk for infection  This means being within 6 feet (2 meters) of the person for at least 15 minutes over 24 hours  · Person-to-person contact can spread the virus  For example, a person with the virus on his or her hands can spread it by shaking hands with someone  · The virus can stay on objects and surfaces for up to 3 days  You may become infected by touching the object or surface and then touching your eyes or mouth  Protect yourself and your baby while you are pregnant: If you have COVID-19 during your pregnancy, healthcare providers will monitor you and your baby closely  Work with your healthcare provider or obstetrician  If you do not have either, experts recommend you contact a local [facility] health center or health department  The following measures can help keep you and your baby safe  Continue even after you are vaccinated against COVID-19  These are still the best ways to prevent infection:  · Wash your hands throughout the day  Use soap and water  Rub your soapy hands together, lacing your fingers  Wash the front and back of each hand, and in between your fingers  Use the fingers of one hand to scrub under the fingernails of the other hand  Wash for at least 20 seconds  Rinse with warm, running water for several seconds  Dry your hands with a clean towel or paper towel  Use hand  that contains alcohol if soap and water are not available  · Protect yourself from sneezes and coughs  Turn your face away and cover your mouth and nose if you are around someone who is sneezing or coughing  This helps protect you from the person's droplets   Cover your mouth and nose with a tissue when you need to sneeze or cough  Use the bend of your arm if you do not have a tissue  Throw the tissue away  Then wash your hands or use hand   · Try not to touch your face  If you get the virus on your hands, you can transfer it to your eyes, nose, or mouth and become infected  You can also transfer it to objects, surfaces, or people  · Follow worldwide, national, and local social distancing guidelines  Keep at least 6 feet (2 meters) between you and others  · Wear a face covering (mask) when needed  Use a disposable non-medical mask, or make a cloth covering with at least 2 layers  You can also create layers by putting a cloth covering over a disposable non-medical mask  Cover your mouth and your nose  Continue social distancing and washing your hands often  Do not put a face shield or covering on your   These increase the risk for sudden infant death syndrome (SIDS)  · Clean and disinfect high-touch surfaces and objects often  Use disinfecting wipes, or make a solution of 4 teaspoons of bleach in 1 quart (4 cups) of water  · Stay home if you are sick or think you may have COVID-19  It is important to stay home if you are waiting for a testing appointment or for test results  · Avoid or limit close physical contact with anyone who does not live in your home  Do not shake hands with, hug, or kiss a person as a greeting  If you must use public transportation (such as a bus or subway), try to sit or stand away from others  Wear your face covering  · Avoid in-person gatherings and crowds  Attend virtually if possible  What you can do to have a healthy pregnancy:   · Keep all prenatal and  appointments  You may be able to have certain prenatal appointments without having to go into the provider's office  Some providers offer phone, video, or other types of appointments  You may also be able to get prescriptions for a few months at a time   This will help lower the number of trips you need to make to the pharmacy for refills  If you do need to go into your provider's office, take precautions  Put a face covering on before you go into the office  Do not stand or sit within 6 feet (2 meters) of anyone in the waiting room, if possible  Do not stand or sit near anyone who is not wearing a face covering  · Get recommended vaccines  Your healthcare provider can tell you if you need vaccines not listed below, and when to get them  ? COVID-19 vaccines are given as a shot in 1 or 2 doses  Vaccination is recommended for everyone 5 years or older  One 2-dose vaccine is fully approved for those 12 or older  This vaccine also has an emergency use authorization (EUA) for children 11to 13years old  No vaccine is currently available for children younger than 5 years  A booster (additional) dose is given to help the immune system continue to protect against severe COVID-19  § A booster is recommended for all adults 18 or older  The booster can be a different brand of the COVID-19 vaccine than you originally received  The timing for the booster depends on the type of vaccine you received:    § 1-dose vaccine: The booster is given at least 2 months after you received the vaccine  § 2-dose vaccine: The booster is given at least 6 months after the second dose   § A booster can be given to adolescents 12to 16years old  Only 1 COVID-19 vaccine has an EUA for adolescent boosters  The booster is given at least 6 months after the second dose of the original vaccine series  · Get the influenza (flu) vaccine  Try to get the vaccine as soon as recommended each year, usually starting in September or October  · Get the Tdap vaccine  The Tdap vaccine protects you from tetanus, diphtheria, and pertussis  If possible, get the vaccine during weeks 27 to 36 of your pregnancy  You should get a dose of Tdap with each pregnancy      Take prenatal vitamins as directed  Your prenatal vitamins should contain folic acid  You need about 600 micrograms (mcg) of folic acid each day during pregnancy  Folic acid helps to form your baby's brain and spinal cord in early pregnancy  Eat a variety of healthy foods  Healthy foods are important, even if you take a prenatal vitamin  Healthy foods contain nutrients that help keep your immune system strong  Examples of healthy foods include vegetables, fruits, whole-grain breads and cereals, lean meats and poultry, fish, low-fat dairy products, and cooked beans  Do not have raw, undercooked, or unpasteurized food or drinks  Unpasteurized foods are foods that have not gone through the heating process (pasteurization) that destroys bacteria  Your healthcare provider or a dietitian can help you create healthy meal plans  Talk to your healthcare provider about exercise  Moderate exercise can help keep your immune system strong  Your healthcare provider can help you plan an exercise program that is safe for you during pregnancy  You may need to exercise at home if you cannot exercise outdoors, such as walking in a park  If you want to do pregnancy yoga or other group activities, be safe  Stay at least 6 feet (2 meters) away from others in the class, and the instructor  Wash your hands before you leave the building  Follow the facility's instructions for preventing infections  Try to lower your stress  You may be feeling more stressed than usual because of the COVID-19 outbreak  You may also feel stress from not being able to share your pregnancy with others  For example, you may not be able to have someone with you during prenatal visits or ultrasounds  Talk to your healthcare providers about ways to manage stress during this time  Pick 1 or 2 times a day to watch the news  Constant news watching about COVID-19 can increase your stress levels   Set a sleep schedule to go to bed and wake up at the same times each day     Do not smoke cigarettes, drink alcohol, or use drugs  Nicotine and other chemicals in cigarettes and cigars can harm your baby and your health  Alcohol can increase your risk for a miscarriage  Your baby may also be born too small or have other health problems  Certain drugs can be passed to your baby before he or she is born  Some can be passed through breast milk  It is best to quit cigarettes, alcohol, and drugs before you become pregnant and not start again after your baby is born  Ask your healthcare provider for information if you currently use any of these and need help to quit  Protect your  during delivery and while you are in the hospital:  It is not known for sure if an unborn baby can be infected with the virus that causes COVID-19  Some newborns have tested positive for the virus  The newborns may have been infected before, during, or after birth  The greatest risk is for a  to be in close contact with an infected person  Your baby may be tested for the virus soon after being born if you have COVID-23  He or she may be tested again before you leave the hospital  This depends on whether your baby has any signs or symptoms of COVID-19  You will be able to make choices for you and your baby during your hospital stay  Talk to healthcare providers about the following:  · Ask about temporary separation if you have COVID-19  Temporary separation means your  is moved to a different room from you  You will be able to make the decision if you want to do this  Separation will help lower your 's risk for being infected  You will still be able to give your  breast milk  You may need to pump the milk  Someone who does not have COVID-19 will then feed the pumped milk to your   You may instead choose to have your baby brought to you when you want to breastfeed  Wash your hands and the skin around your nipples before you hold your baby   Wear a face covering while you breastfeed  · Be careful if you have COVID-19 and do not choose temporary separation  Healthcare providers will keep your  at least 6 feet (2 meters) away from you as much as possible  Your  may be placed in an incubator  The incubator will help protect your  from infection  Always wash your hands and put on a face covering when you hold, touch, or have close contact with your   · Ask about visitors  The facility may not be allowing any visitors to newborns during this time  If you are allowed visitors, you may need to limit how many you can have at a time  Do not allow anyone who has known or suspected COVID-19 to visit  Even without signs or symptoms, the person can infect your  or others in the room  All visitors need to wash their hands and put on clean face coverings before entering your room  The face covering needs to stay on during the whole visit  Do not let anyone take the face covering down to make faces at your baby, talk, sneeze, or cough  Do not let anyone kiss you or your baby  Protect your  at home:   · You can choose to continue temporary separation if you have COVID-19  You can do this if an adult who does not have COVID-19 can care for your   Your healthcare provider can give you instructions on how to do this safely at home  Only have close contact with your  when needed  Remember to wash your hands and put on a clean face covering first  You may need to continue pumping your breast milk  A healthy adult can feed the pumped breast milk to your   You may instead choose to have your baby brought to you when you want to breastfeed  Take precautions to keep your baby safe  Wash your hands and the skin around your nipples before you hold your baby  You will also need to wear a face covering while you breastfeed  · Use face coverings safely    Everyone who has COVID-19 needs to wear a clean face covering while being within 6 feet (2 meters) of your   This includes other children in your home who are 2 years or older  Do not put a face covering or plastic face shield on your   Any covering increases your 's risk for sudden infant death syndrome (SIDS)  Do not use coverings on children younger than 2 years or on anyone who has breathing problems or cannot remove it  · Be careful about visitors  Continue precautions you used in the hospital  Do not allow anyone who has known or suspected COVID-19 to come over to see your   Have visitors put on clean face coverings before they enter your home  Have them wash their hands as soon as they come in  The face covering needs to stay on during the whole visit  · Keep all checkup appointments  You may be able to have some appointments by phone or video meeting  Other appointments will need to be in person, such as for vaccines  Vaccines are normally given to babies at certain ages  Until COVID-19 is under control, your 's provider will give you a vaccine schedule  It is important for your  to get all recommended vaccines  What you need to know about breastfeeding:  Breastfeeding for the first 6 months decreases your baby's risk for respiratory (lung) infections, allergies, asthma, and stomach problems  Breast milk also helps your baby develop a strong immune system  Breast milk is considered safe, even if you have COVID-19  Experts currently believe the virus that causes COVID-19 does not spread in breast milk  Do the following to help protect your baby:  · Wash your hands before every breastfeeding or pumping session  Even if you do not have COVID-19, you can transfer the virus from your hands to your baby or the pump  Use soap and water to wash your hands whenever possible  Use hand  that contains alcohol if soap and water are not available  · Clean and sanitize your breast pump after each use    Follow the 's directions for cleaning and sanitizing the pump  It is important not to use it until it is clean and sanitized  · If you have COVID-19:      ? Wear a face covering while you breastfeed or pump  This will help prevent you from passing the virus through droplets when you talk, cough, sneeze, or sing  The virus can stay on surfaces such as a breast pump for hours to days  ? Have someone who is not infected bottle feed your baby, if possible  Have the person wash his or her hands with soap and water before each feeding  The person can feed your  pumped breast milk or formula  Follow up with your doctor or obstetrician as directed:  Write down your questions so you remember to ask them during your visits  For more information:   · Centers for Disease Control and Prevention  [address] , [address]  Phone: 3- 675 - 835-1124  Web Address: [employer] com br    ©  [employer]  Information is for End User's use only and may not be sold, redistributed or otherwise used for commercial purposes  All illustrations and images included in CareNotes® are the copyrighted property of A D A M , Inc  or [facility] [employer]   The above information is an  only  It is not intended as medical advice for individual conditions or treatments  Talk to your doctor, nurse or pharmacist before following any medical regimen to see if it is safe and effective for you  Pregnancy at [address] to [address]:   What changes are happening with my body? Now that you are in your second trimester, you have more energy  You may also be feeling hungrier than usual  You may be gaining about ½ to 1 pound a week, and your pregnancy is beginning to show  You may need to start wearing maternity clothes  As your baby gets larger, you may have other symptoms  These may include body aches or stretch marks on your abdomen, breasts, thighs, or buttocks    How do I care for myself at this stage of my pregnancy? · Eat a variety of healthy foods  Healthy foods include fruits, vegetables, whole-grain breads, low-fat dairy foods, beans, lean meats, and fish  Drink liquids as directed  Ask how much liquid to drink each day and which liquids are best for you  Limit caffeine to less than 200 milligrams each day  Limit your intake of fish to 2 servings each week  Choose fish low in mercury such as canned light tuna, shrimp, salmon, cod, or tilapia  Do not  eat fish high in mercury such as swordfish, tilefish, kesha mackerel, and shark  · Take prenatal vitamins as directed  Your need for certain vitamins and minerals, such as folic acid, increases during pregnancy  Prenatal vitamins provide some of the extra vitamins and minerals you need  Prenatal vitamins may also help to decrease the risk of certain birth defects  · Talk to your healthcare provider about exercise  Moderate exercise can help you stay fit  Your healthcare provider will help you plan an exercise program that is safe for you during pregnancy  · Do not smoke  Smoking increases your risk of a miscarriage and other health problems during your pregnancy  Smoking can cause your baby to be born too early or weigh less at birth  Ask your healthcare provider for information if you need help quitting  · Do not drink alcohol  Alcohol passes from your body to your baby through the placenta  It can affect your baby's brain development and cause fetal alcohol syndrome (FAS)  FAS is a group of conditions that causes mental, behavior, and growth problems  · Talk to your healthcare provider before you take any medicines  Many medicines may harm your baby if you take them when you are pregnant  Do not take any medicines, vitamins, herbs, or supplements without first talking to your healthcare provider  Never use illegal or street drugs (such as marijuana or cocaine) while you are pregnant      What are some safety tips during pregnancy? · Avoid hot tubs and saunas  Do not use a hot tub or sauna while you are pregnant, especially during your first trimester  Hot tubs and saunas may raise your baby's temperature and increase the risk of birth defects  · Avoid toxoplasmosis  This is an infection caused by eating raw meat or being around infected cat feces  It can cause birth defects, miscarriages, and other problems  Wash your hands after you touch raw meat  Make sure any meat is well-cooked before you eat it  Avoid raw eggs and unpasteurized milk  Use gloves or ask someone else to clean your cat's litter box while you are pregnant  What changes are happening with my baby? By 22 weeks, your baby is about 8 inches long from the top of the head to the rump (baby's bottom)  Your baby also weighs about 1 pound  Your baby is becoming much more active  You may be able to feel the baby move inside you now  The first movements may not be that noticeable  They may feel like a fluttering sensation  As time goes on, your baby's movements will become stronger and more noticeable  What do I need to know about prenatal care? During the first 28 weeks of your pregnancy, you will see your healthcare provider once a month  Your healthcare provider will check your blood pressure and weight  You may also need the following:  · A urine test  may also be done to check for sugar and protein  These can be signs of gestational diabetes or infection  Protein in your urine may also be a sign of preeclampsia  Preeclampsia is a condition that can develop during week 20 or later of your pregnancy  It causes high blood pressure, and it can cause problems with your kidneys and other organs  · Fundal height  is a measurement of your uterus to check your baby's growth  This number is usually the same as the number of weeks that you have been pregnant  · A fetal ultrasound  shows pictures of your baby inside your uterus   It shows your baby's development  The movement and position of your baby can also be seen  Your healthcare provider may be able to tell you what your baby's gender is during the ultrasound  · Your baby's heart rate  will be checked  When should I seek immediate care? · You develop a severe headache that does not go away  · You have new or increased vision changes, such as blurred or spotted vision  · You have new or increased swelling in your face or hands  · You have vaginal spotting or bleeding  · Your water broke or you feel warm water gushing or trickling from your vagina  When should I call my doctor or obstetrician? · You have abdominal cramps, pressure, or tightening  · You have a change in vaginal discharge  · You cannot keep food or drinks down, and you are losing weight  · You have chills or a fever  · You have vaginal itching, burning, or pain  · You have yellow, green, white, or foul-smelling vaginal discharge  · You have pain or burning when you urinate, less urine than usual, or pink or bloody urine  · You have questions or concerns about your condition or care  CARE AGREEMENT:   You have the right to help plan your care  Learn about your health condition and how it may be treated  Discuss treatment options with your healthcare providers to decide what care you want to receive  You always have the right to refuse treatment  The above information is an  only  It is not intended as medical advice for individual conditions or treatments  Talk to your doctor, nurse or pharmacist before following any medical regimen to see if it is safe and effective for you  © Copyright [address] 2022 Information is for End User's use only and may not be sold, redistributed or otherwise used for commercial purposes   All illustrations and images included in CareNotes® are the copyrighted property of A D A M , Inc  or [facility]  Pregnancy at 15 to [address] [address] YOU NEED TO KNOW:   What changes are happening in my body? Now that you are in your second trimester, you have more energy  You may also feel hungrier than usual  You may start to experience other symptoms, such as heartburn or dizziness  You may be gaining about ½ to 1 pound a week, and your pregnancy is beginning to show  You may need to start wearing maternity clothes  How do I care for myself at this stage of my pregnancy? · Manage heartburn  by eating 4 or 5 small meals each day instead of large meals  Avoid spicy foods  Avoid eating right before bedtime  · Manage nausea and vomiting  Avoid fatty and spicy foods  Eat small meals throughout the day instead of large meals  Dian may help to decrease nausea  Ask your healthcare provider about other ways of decreasing nausea and vomiting  · Eat a variety of healthy foods  Healthy foods include fruits, vegetables, whole-grain breads, low-fat dairy foods, beans, lean meats, and fish  Drink liquids as directed  Ask how much liquid to drink each day and which liquids are best for you  Limit caffeine to less than 200 milligrams each day  Limit your intake of fish to 2 servings each week  Choose fish low in mercury such as canned light tuna, shrimp, salmon, cod, or tilapia  Do not  eat fish high in mercury such as swordfish, tilefish, kesha mackerel, and shark  · Take prenatal vitamins as directed  Your need for certain vitamins and minerals, such as folic acid, increases during pregnancy  Prenatal vitamins provide some of the extra vitamins and minerals you need  Prenatal vitamins may also help to decrease the risk of certain birth defects  · Do not smoke  Smoking increases your risk of a miscarriage and other health problems during your pregnancy  Smoking can cause your baby to be born too early or weigh less at birth  Ask your healthcare provider for information if you need help quitting  · Do not drink alcohol    Alcohol passes from your body to your baby through the placenta  It can affect your baby's brain development and cause fetal alcohol syndrome (FAS)  FAS is a group of conditions that causes mental, behavior, and growth problems  · Talk to your healthcare provider before you take any medicines  Many medicines may harm your baby if you take them when you are pregnant  Do not take any medicines, vitamins, herbs, or supplements without first talking to your healthcare provider  Never use illegal or street drugs (such as marijuana or cocaine) while you are pregnant  What are some safety tips during pregnancy? · Avoid hot tubs and saunas  Do not use a hot tub or sauna while you are pregnant, especially during your first trimester  Hot tubs and saunas may raise your baby's temperature and increase the risk of birth defects  · Avoid toxoplasmosis  This is an infection caused by eating raw meat or being around infected cat feces  It can cause birth defects, miscarriages, and other problems  Wash your hands after you touch raw meat  Make sure any meat is well-cooked before you eat it  Avoid raw eggs and unpasteurized milk  Use gloves or ask someone else to clean your cat's litter box while you are pregnant  What changes are happening with my baby? By 18 weeks, your baby may be about 6 inches long from the top of the head to the rump (baby's bottom)  Your baby may weigh about 11 ounces  You may be able to feel your baby's movement at about 18 weeks or later  The first movements may not be that noticeable  They may feel like a fluttering sensation  Your baby also makes sucking movements and can hear certain sounds  What do I need to know about prenatal care? During the first 28 weeks of your pregnancy, you will see your healthcare provider once a month  Your healthcare provider will check your blood pressure and weight   You may also need any of the following:  · A urine test  may also be done to check for sugar and protein  These can be signs of gestational diabetes or infection  · A blood test  may be done to check for anemia (low iron level)  · Fundal height check  is a measurement of your uterus to check your baby's growth  This number is usually the same as the number of weeks that you have been pregnant  · An ultrasound  may be done to check your baby's development  Your healthcare provider may be able to tell you what your baby's gender is during the ultrasound  · Your baby's heart rate  will be checked  When should I seek immediate care? · You have pain or cramping in your abdomen or low back  · You have heavy vaginal bleeding or clotting  · You pass material that looks like tissue or large clots  Collect the material and bring it with you  When should I call my doctor or obstetrician? · You cannot keep food or drinks down, and you are losing weight  · You have light bleeding  · You have chills or a fever  · You have vaginal itching, burning, or pain  · You have yellow, green, white, or foul-smelling vaginal discharge  · You have pain or burning when you urinate, less urine than usual, or pink or bloody urine  · You have questions or concerns about your condition or care  CARE AGREEMENT:   You have the right to help plan your care  Learn about your health condition and how it may be treated  Discuss treatment options with your healthcare providers to decide what care you want to receive  You always have the right to refuse treatment  The above information is an  only  It is not intended as medical advice for individual conditions or treatments  Talk to your doctor, nurse or pharmacist before following any medical regimen to see if it is safe and effective for you  © Copyright [employer] 2022 Information is for End User's use only and may not be sold, redistributed or otherwise used for commercial purposes   All illustrations and images included in CareNotes® are the copyrighted property of A D A M , Inc  or [address] [place], [place] [address]henry marcie

## 2022-03-21 ENCOUNTER — ROUTINE PRENATAL (OUTPATIENT)
Dept: OBGYN CLINIC | Facility: MEDICAL CENTER | Age: 32
End: 2022-03-21

## 2022-03-21 VITALS
BODY MASS INDEX: 22.49 KG/M2 | SYSTOLIC BLOOD PRESSURE: 104 MMHG | WEIGHT: 135 LBS | HEIGHT: 65 IN | DIASTOLIC BLOOD PRESSURE: 58 MMHG

## 2022-03-21 DIAGNOSIS — O99.322 DRUG DEPENDENCE DURING PREGNANCY IN SECOND TRIMESTER (HCC): ICD-10-CM

## 2022-03-21 DIAGNOSIS — Z3A.16 16 WEEKS GESTATION OF PREGNANCY: ICD-10-CM

## 2022-03-21 DIAGNOSIS — Z86.32 HISTORY OF GESTATIONAL DIABETES IN PRIOR PREGNANCY, CURRENTLY PREGNANT IN SECOND TRIMESTER: Primary | ICD-10-CM

## 2022-03-21 DIAGNOSIS — F19.20 DRUG DEPENDENCE DURING PREGNANCY IN SECOND TRIMESTER (HCC): ICD-10-CM

## 2022-03-21 DIAGNOSIS — O09.292 HISTORY OF GESTATIONAL DIABETES IN PRIOR PREGNANCY, CURRENTLY PREGNANT IN SECOND TRIMESTER: Primary | ICD-10-CM

## 2022-03-21 PROCEDURE — PNV: Performed by: NURSE PRACTITIONER

## 2022-03-21 NOTE — PROGRESS NOTES
Denies loss of fluid, vaginal bleeding and abdominal pain  Felt rare flutters for fetal movement  Tolerating prenatal vitamin and Zoloft well  Patient denies worsening symptoms, thoughts of self-harm or harm to others  Does admit to difficulty sleeping  Completed part 1 of sequential screen for genetic screening  Has level 2 ultrasound scheduled  Last delivery VAVD with second-degree laceration  Patient with questions regarding repeat vaginal delivery verses elective  section  BP: 104/58  Weight: +9lb  Plan:  -continue prenatal vitamins daily  -continue Zoloft daily  Encouraged to call office with worsening symptoms, thoughts of self-harm or harm to others  Continue meeting with psychologist monthly  -completed part 1 of sequential screen  Reviewed part 2 between 12 and 18 gestational weeks  -follow-up  Center scheduled for 22  -last delivery VAVD- 20 7 lb 11 oz according to delivery summary VAC was placed due to persistent category 2 fetal heart tones  Reviewed with patient can continue to discuss options    Reviewed multiple factors to consider including fetal weight, gestational diabetes, elective induction versus spontaneous labor   -common discomforts of pregnancy and precautions reviewed  RTO 4 weeks

## 2022-03-30 ENCOUNTER — APPOINTMENT (OUTPATIENT)
Dept: LAB | Facility: HOSPITAL | Age: 32
End: 2022-03-30
Payer: COMMERCIAL

## 2022-03-30 DIAGNOSIS — Z33.1 PREGNANT STATE, INCIDENTAL: ICD-10-CM

## 2022-03-30 DIAGNOSIS — Z36.9 UNSPECIFIED ANTENATAL SCREENING: ICD-10-CM

## 2022-03-30 PROCEDURE — 36415 COLL VENOUS BLD VENIPUNCTURE: CPT

## 2022-03-31 LAB — SCAN RESULT: NORMAL

## 2022-04-02 ENCOUNTER — APPOINTMENT (OUTPATIENT)
Dept: LAB | Facility: HOSPITAL | Age: 32
End: 2022-04-02

## 2022-04-02 DIAGNOSIS — Z00.8 HEALTH EXAMINATION IN POPULATION SURVEY: ICD-10-CM

## 2022-04-02 LAB
CHOLEST SERPL-MCNC: 248 MG/DL
EST. AVERAGE GLUCOSE BLD GHB EST-MCNC: 100 MG/DL
HBA1C MFR BLD: 5.1 %
HDLC SERPL-MCNC: 69 MG/DL
LDLC SERPL CALC-MCNC: 125 MG/DL (ref 0–100)
NONHDLC SERPL-MCNC: 179 MG/DL
TRIGL SERPL-MCNC: 270 MG/DL

## 2022-04-02 PROCEDURE — 83036 HEMOGLOBIN GLYCOSYLATED A1C: CPT

## 2022-04-02 PROCEDURE — 36415 COLL VENOUS BLD VENIPUNCTURE: CPT

## 2022-04-02 PROCEDURE — 80061 LIPID PANEL: CPT

## 2022-04-10 NOTE — PATIENT INSTRUCTIONS
Thank you for choosing us for your  care today  If you have any questions about your ultrasound or care, please do not hesitate to contact us or your primary obstetrician  Some general instructions for your pregnancy are:     Protect against coronavirus: get vaccinated - pregnant women are increased risk of severe COVID  Notify your primary care doctor if you have any symptoms   Exercise: Aim for 22 minutes per day (150 minutes per week) of regular exercise  Walking is great!  Nutrition: aim for calcium-rich and iron-rich foods as well as healthy sources of protein   Learn about Preeclampsia: preeclampsia is a common, serious high blood pressure complication in pregnancy  A blood pressure of 082AKVF (systolic or top number) or 62LFRC (diastolic or bottom number) is not normal and needs evaluation by your doctor  Aspirin is sometimes prescribed in early pregnancy to prevent preeclampsia in women with risk factors - ask your obstetrician if you should be on this medication   If you smoke, try to reduce how many cigarettes you smoke or try to quit completely  Do not vape   Other warning signs to watch out for in pregnancy or postpartum: chest pain, obstructed breathing or shortness of breath, seizures, thoughts of hurting yourself or your baby, bleeding, a painful or swollen leg, fever, or headache (see [facility] POST-BIRTH Warning Signs campaign)  If these happen call 911  Itching is also not normal in pregnancy and if you experience this, especially over your hands and feet, potentially worse at night, notify your doctors

## 2022-04-13 ENCOUNTER — ROUTINE PRENATAL (OUTPATIENT)
Dept: PERINATAL CARE | Facility: OTHER | Age: 32
End: 2022-04-13
Payer: COMMERCIAL

## 2022-04-13 ENCOUNTER — TELEPHONE (OUTPATIENT)
Dept: PERINATAL CARE | Facility: OTHER | Age: 32
End: 2022-04-13

## 2022-04-13 VITALS
HEART RATE: 76 BPM | SYSTOLIC BLOOD PRESSURE: 111 MMHG | WEIGHT: 138 LBS | HEIGHT: 65 IN | DIASTOLIC BLOOD PRESSURE: 73 MMHG | BODY MASS INDEX: 22.99 KG/M2

## 2022-04-13 DIAGNOSIS — O35.8XX0 PYELECTASIS OF FETUS ON PRENATAL ULTRASOUND: ICD-10-CM

## 2022-04-13 DIAGNOSIS — Z36.86 ENCOUNTER FOR ANTENATAL SCREENING FOR CERVICAL LENGTH: ICD-10-CM

## 2022-04-13 DIAGNOSIS — Z86.32 HISTORY OF GESTATIONAL DIABETES IN PRIOR PREGNANCY, CURRENTLY PREGNANT IN SECOND TRIMESTER: ICD-10-CM

## 2022-04-13 DIAGNOSIS — O09.292 HISTORY OF GESTATIONAL DIABETES IN PRIOR PREGNANCY, CURRENTLY PREGNANT IN SECOND TRIMESTER: ICD-10-CM

## 2022-04-13 DIAGNOSIS — Z36.3 ENCOUNTER FOR ANTENATAL SCREENING FOR MALFORMATIONS: Primary | ICD-10-CM

## 2022-04-13 PROBLEM — O35.EXX0 PYELECTASIS OF FETUS ON PRENATAL ULTRASOUND: Status: ACTIVE | Noted: 2022-04-13

## 2022-04-13 PROCEDURE — 76817 TRANSVAGINAL US OBSTETRIC: CPT | Performed by: OBSTETRICS & GYNECOLOGY

## 2022-04-13 PROCEDURE — 76811 OB US DETAILED SNGL FETUS: CPT | Performed by: OBSTETRICS & GYNECOLOGY

## 2022-04-13 PROCEDURE — 99213 OFFICE O/P EST LOW 20 MIN: CPT | Performed by: OBSTETRICS & GYNECOLOGY

## 2022-04-13 NOTE — TELEPHONE ENCOUNTER
I was trying to call pt to setup her 12w followup which would be around 7/6 , but unable to leave a message due to voicemail being full, I will send a [employer] message

## 2022-04-13 NOTE — PROGRESS NOTES
Ultrasound Probe Disinfection    A transvaginal ultrasound was performed  Prior to use, disinfection was performed with High Level Disinfection Process (Trophon)  Probe serial number [id number]: [id number] was used        Gavin Stevenson RDMS  04/13/22  1:07 PM

## 2022-04-13 NOTE — PROGRESS NOTES
[address]: Ms Ty Yusuf was seen today for anatomic survey and cervical length screening ultrasound  See ultrasound report under "OB Procedures" tab  The time spent on this established patient on the encounter date included 5 minutes previsit service time reviewing records and precharting, 7 minutes face-to-face service time counseling regarding results and coordinating care, and  5 minutes charting, totalling 17 minutes  Please don't hesitate to contact our office with any concerns or questions    Veto Aguayo MD

## 2022-04-14 PROBLEM — Z3A.20 20 WEEKS GESTATION OF PREGNANCY: Status: ACTIVE | Noted: 2022-02-21

## 2022-04-14 PROBLEM — Z86.32 HISTORY OF INSULIN CONTROLLED GESTATIONAL DIABETES MELLITUS (GDM): Status: RESOLVED | Noted: 2022-02-21 | Resolved: 2022-04-14

## 2022-05-09 ENCOUNTER — ROUTINE PRENATAL (OUTPATIENT)
Dept: OBGYN CLINIC | Facility: MEDICAL CENTER | Age: 32
End: 2022-05-09

## 2022-05-09 VITALS — SYSTOLIC BLOOD PRESSURE: 90 MMHG | DIASTOLIC BLOOD PRESSURE: 60 MMHG | WEIGHT: 143 LBS | BODY MASS INDEX: 23.8 KG/M2

## 2022-05-09 DIAGNOSIS — O35.8XX0 PYELECTASIS OF FETUS ON PRENATAL ULTRASOUND: ICD-10-CM

## 2022-05-09 DIAGNOSIS — Z3A.23 23 WEEKS GESTATION OF PREGNANCY: ICD-10-CM

## 2022-05-09 DIAGNOSIS — Z34.92 SECOND TRIMESTER PREGNANCY: Primary | ICD-10-CM

## 2022-05-09 DIAGNOSIS — K59.01 SLOW TRANSIT CONSTIPATION: ICD-10-CM

## 2022-05-09 DIAGNOSIS — O09.292 HISTORY OF GESTATIONAL DIABETES IN PRIOR PREGNANCY, CURRENTLY PREGNANT IN SECOND TRIMESTER: ICD-10-CM

## 2022-05-09 DIAGNOSIS — Z86.32 HISTORY OF GESTATIONAL DIABETES IN PRIOR PREGNANCY, CURRENTLY PREGNANT IN SECOND TRIMESTER: ICD-10-CM

## 2022-05-09 PROCEDURE — PNV: Performed by: OBSTETRICS & GYNECOLOGY

## 2022-05-09 NOTE — PROGRESS NOTES
Assessment  33 y o   at 23w5d presenting for routine prenatal visit  Plan  Diagnoses and all orders for this visit:    Second trimester pregnancy  23 weeks gestation of pregnancy  - Second trimester precautions  - Normal movement discussed  - Level 2 reviewed  - Return in 4wks for PN    History of gestational diabetes in prior pregnancy, currently pregnant in second trimester  - Early glucose wnl; 28wk labs next visit    Pyelectasis of fetus on prenatal ultrasound  - Follows with MFM  - Recheck in 3rd trimester    Slow transit constipation  - Advised daily senna until regular BM  - Discussed alternatives if ineffective  Declines and will inc senna frequency first      ____________________________________________________________        Subjective    Chacho Pollack is a 33 y o   at 23w5d who presents for routine prenatal visit  She is reporting constipation  Miralax does not work for her; hx of use in past  She usually uses a smooth moves tea, which is senna based  However, this wasn't working so switched to senna every other day  Improved but not resolved  Also notes intermittently dizzy at work  Low BP when it happens  Denies contractions, loss of fluid, or vaginal bleeding  She feels regular fetal movements  Pregnancy Problems:  Patient Active Problem List   Diagnosis    PEPE (generalized anxiety disorder)    Otalgia of both ears    Right hand pain    20 weeks gestation of pregnancy    Mild episode of recurrent major depressive disorder ([address] )    Drug dependence during pregnancy in second trimester ([address] )    History of gestational diabetes in prior pregnancy, currently pregnant in second trimester    Pyelectasis of fetus on prenatal ultrasound         Objective  BP 90/60   Wt 64 9 kg (143 lb)   LMP 2021   BMI 23 80 kg/m²     FHT: 133 BPM   Uterine Size: 23 cm     Physical Exam:  Physical Exam  Constitutional:       General: She is not in acute distress       Appearance: Normal appearance  She is well-developed  She is not ill-appearing, toxic-appearing or diaphoretic  HENT:      Head: Normocephalic and atraumatic  Eyes:      General: No scleral icterus  Right eye: No discharge  Left eye: No discharge  Conjunctiva/sclera: Conjunctivae normal    Pulmonary:      Effort: Pulmonary effort is normal  No accessory muscle usage or respiratory distress  Abdominal:      General: There is distension (gravid)  Tenderness: There is no abdominal tenderness  There is no guarding or rebound  Skin:     General: Skin is warm and dry  Coloration: Skin is not jaundiced  Findings: No bruising, erythema or rash  Neurological:      Mental Status: She is alert  Psychiatric:         Mood and Affect: Mood normal          Behavior: Behavior normal          Thought Content:  Thought content normal          Judgment: Judgment normal

## 2022-05-12 ENCOUNTER — TELEPHONE (OUTPATIENT)
Dept: PSYCHIATRY | Facility: CLINIC | Age: 32
End: 2022-05-12

## 2022-05-12 NOTE — TELEPHONE ENCOUNTER
Patient left voicemail with the office requesting her Zoloft, spoke with the Pa-C she does need to be seen  Patients last visit was 01/19/2022

## 2022-06-08 ENCOUNTER — TELEMEDICINE (OUTPATIENT)
Dept: PSYCHIATRY | Facility: CLINIC | Age: 32
End: 2022-06-08
Payer: COMMERCIAL

## 2022-06-08 DIAGNOSIS — F33.0 MILD EPISODE OF RECURRENT MAJOR DEPRESSIVE DISORDER (HCC): Primary | ICD-10-CM

## 2022-06-08 DIAGNOSIS — F41.1 GAD (GENERALIZED ANXIETY DISORDER): ICD-10-CM

## 2022-06-08 DIAGNOSIS — F32.0 CURRENT MILD EPISODE OF MAJOR DEPRESSIVE DISORDER, UNSPECIFIED WHETHER RECURRENT (HCC): ICD-10-CM

## 2022-06-08 PROCEDURE — 99213 OFFICE O/P EST LOW 20 MIN: CPT | Performed by: PHYSICIAN ASSISTANT

## 2022-06-08 RX ORDER — SERTRALINE HYDROCHLORIDE 25 MG/1
25 TABLET, FILM COATED ORAL DAILY
Qty: 90 TABLET | Refills: 0 | Status: SHIPPED | OUTPATIENT
Start: 2022-06-08 | End: 2022-06-21
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
## 2022-06-16 ENCOUNTER — ROUTINE PRENATAL (OUTPATIENT)
Dept: OBGYN CLINIC | Facility: MEDICAL CENTER | Age: 32
End: 2022-06-16
Payer: COMMERCIAL

## 2022-06-16 VITALS — BODY MASS INDEX: 24.73 KG/M2 | DIASTOLIC BLOOD PRESSURE: 54 MMHG | SYSTOLIC BLOOD PRESSURE: 96 MMHG | WEIGHT: 148.6 LBS

## 2022-06-16 DIAGNOSIS — Z34.93 THIRD TRIMESTER PREGNANCY: Primary | ICD-10-CM

## 2022-06-16 DIAGNOSIS — Z30.09 STERILIZATION EDUCATION: ICD-10-CM

## 2022-06-16 DIAGNOSIS — O35.8XX0 PYELECTASIS OF FETUS ON PRENATAL ULTRASOUND: ICD-10-CM

## 2022-06-16 DIAGNOSIS — F33.0 MILD EPISODE OF RECURRENT MAJOR DEPRESSIVE DISORDER (HCC): ICD-10-CM

## 2022-06-16 DIAGNOSIS — Z3A.29 29 WEEKS GESTATION OF PREGNANCY: ICD-10-CM

## 2022-06-16 LAB
BASOPHILS # BLD AUTO: 0.03 THOUSANDS/ΜL (ref 0–0.1)
BASOPHILS NFR BLD AUTO: 0 % (ref 0–1)
EOSINOPHIL # BLD AUTO: 0.22 THOUSAND/ΜL (ref 0–0.61)
EOSINOPHIL NFR BLD AUTO: 2 % (ref 0–6)
ERYTHROCYTE [DISTWIDTH] IN BLOOD BY AUTOMATED COUNT: 13.6 % (ref 11.6–15.1)
GLUCOSE 1H P 50 G GLC PO SERPL-MCNC: 166 MG/DL (ref 40–134)
HCT VFR BLD AUTO: 33.7 % (ref 34.8–46.1)
HGB BLD-MCNC: 11.1 G/DL (ref 11.5–15.4)
IMM GRANULOCYTES # BLD AUTO: 0.16 THOUSAND/UL (ref 0–0.2)
IMM GRANULOCYTES NFR BLD AUTO: 2 % (ref 0–2)
LYMPHOCYTES # BLD AUTO: 1.9 THOUSANDS/ΜL (ref 0.6–4.47)
LYMPHOCYTES NFR BLD AUTO: 18 % (ref 14–44)
MCH RBC QN AUTO: 32.9 PG (ref 26.8–34.3)
MCHC RBC AUTO-ENTMCNC: 32.9 G/DL (ref 31.4–37.4)
MCV RBC AUTO: 100 FL (ref 82–98)
MONOCYTES # BLD AUTO: 0.75 THOUSAND/ΜL (ref 0.17–1.22)
MONOCYTES NFR BLD AUTO: 7 % (ref 4–12)
NEUTROPHILS # BLD AUTO: 7.5 THOUSANDS/ΜL (ref 1.85–7.62)
NEUTS SEG NFR BLD AUTO: 71 % (ref 43–75)
NRBC BLD AUTO-RTO: 0 /100 WBCS
PLATELET # BLD AUTO: 234 THOUSANDS/UL (ref 149–390)
PMV BLD AUTO: 10.6 FL (ref 8.9–12.7)
RBC # BLD AUTO: 3.37 MILLION/UL (ref 3.81–5.12)
WBC # BLD AUTO: 10.56 THOUSAND/UL (ref 4.31–10.16)

## 2022-06-16 PROCEDURE — 85025 COMPLETE CBC W/AUTO DIFF WBC: CPT | Performed by: ADVANCED PRACTICE MIDWIFE

## 2022-06-16 PROCEDURE — PNV: Performed by: ADVANCED PRACTICE MIDWIFE

## 2022-06-16 PROCEDURE — 90715 TDAP VACCINE 7 YRS/> IM: CPT | Performed by: ADVANCED PRACTICE MIDWIFE

## 2022-06-16 PROCEDURE — 36415 COLL VENOUS BLD VENIPUNCTURE: CPT | Performed by: ADVANCED PRACTICE MIDWIFE

## 2022-06-16 PROCEDURE — 82950 GLUCOSE TEST: CPT | Performed by: ADVANCED PRACTICE MIDWIFE

## 2022-06-16 PROCEDURE — 90471 IMMUNIZATION ADMIN: CPT | Performed by: ADVANCED PRACTICE MIDWIFE

## 2022-06-16 NOTE — ASSESSMENT & PLAN NOTE
- Birth plan given, peds list given, birth consent signed  - 28 wk labs drawn  - Tdap given, recommendations reviewed for family vaccination  - uses [place] was not indicated  - next visit 2 weeks

## 2022-06-16 NOTE — ASSESSMENT & PLAN NOTE
-Interested in tubal sterilization  Bashir Locus is aware that procedure is permanent and cannot be reversed  - Will continue to discuss options

## 2022-06-16 NOTE — PROGRESS NOTES
Routine Prenatal Visit  [facility] [address] [place] [address], [address]    Assessment/Plan:  Nereida Feng is a 33y o  year old  at 29w1d who presents for routine prenatal visit  1  Third trimester pregnancy  -     CBC and differential  -     Glucose, 1H PG  -     Anemia Panel w/Reflex, OB  -     Tdap Vaccine greater than or equal to 6yo    2  29 weeks gestation of pregnancy  Assessment & Plan:  - Birth plan given, peds list given, birth consent signed  - 28 wk labs drawn  - Tdap given, recommendations reviewed for family vaccination  - [place] was not indicated  - next visit 2 weeks      Orders:  -     CBC and differential  -     Glucose, 1H PG  -     Anemia Panel w/Reflex, OB  -     Tdap Vaccine greater than or equal to 6yo    3  Pyelectasis of fetus on prenatal ultrasound  Assessment & Plan:  Follow-up ultrasound with [facility] 22      4  Mild episode of recurrent major depressive disorder [facility])  Assessment & Plan:  - has regular visits with provider, on Zoloft      5  Sterilization education  Assessment & Plan:  -Interested in tubal sterilization  Nereida Feng is aware that procedure is permanent and cannot be reversed  - Will continue to discuss options  Subjective:     CC: Prenatal care    Ellis Renae is a 33 y o   female who presents for routine prenatal care at 29w1d  Pregnancy ROS: No leakage of fluid, pelvic pain, or vaginal bleeding  Good fetal movement         The following portions of the patient's history were reviewed and updated as appropriate: allergies, current medications, past family history, past medical history, obstetric history, gynecologic history, past social history, past surgical history and problem list       Objective:  BP 96/54   Wt 67 4 kg (148 lb 9 6 oz)   LMP 2021   BMI 24 73 kg/m²   Pregravid Weight/BMI: 57 2 kg (126 lb) (BMI 20 97)  Current Weight: 67 4 kg (148 lb 9 6 oz)   Total Weight Gain: 10 3 kg (22 lb 9 6 oz)   Pre-Dorothea Vitals    Flowsheet Row Most Recent Value   Prenatal Assessment    Fetal Heart Rate 142   Fundal Height (cm) 30 cm   Movement Present   Prenatal Vitals    Blood Pressure 96/54   Weight - Scale 67 4 kg (148 lb 9 6 oz)   Urine Albumin/Glucose    Dilation/Effacement/Station    Vaginal Drainage    Edema    LLE Edema None   RLE Edema None           General: Well appearing, no distress  Respiratory: Unlabored breathing  Cardiovascular: Regular rate  Abdomen: Soft, gravid, nontender  Fundal Height: Appropriate for gestational age  Extremities: Warm and well perfused  Non tender

## 2022-06-17 ENCOUNTER — TELEPHONE (OUTPATIENT)
Dept: OBGYN CLINIC | Facility: MEDICAL CENTER | Age: 32
End: 2022-06-17

## 2022-06-17 DIAGNOSIS — Z34.93 THIRD TRIMESTER PREGNANCY: Primary | ICD-10-CM

## 2022-06-17 NOTE — TELEPHONE ENCOUNTER
Patient return provider message in regards to 3 Hr Glucose scheduling  Gave patient number for lab to schedule, lab placed in chart

## 2022-06-20 ENCOUNTER — HOSPITAL ENCOUNTER (EMERGENCY)
Facility: HOSPITAL | Age: 32
Discharge: HOME | End: 2022-06-20
Attending: FAMILY MEDICINE | Admitting: FAMILY MEDICINE
Payer: COMMERCIAL

## 2022-06-20 VITALS
HEART RATE: 105 BPM | TEMPERATURE: 98 F | SYSTOLIC BLOOD PRESSURE: 109 MMHG | BODY MASS INDEX: 24.66 KG/M2 | HEIGHT: 65 IN | WEIGHT: 148 LBS | DIASTOLIC BLOOD PRESSURE: 57 MMHG | RESPIRATION RATE: 20 BRPM | OXYGEN SATURATION: 98 %

## 2022-06-20 DIAGNOSIS — O21.9 NAUSEA AND VOMITING IN PREGNANCY: Primary | ICD-10-CM

## 2022-06-20 LAB
ALBUMIN SERPL BCP-MCNC: 3.7 G/DL (ref 3.5–5)
ALP SERPL-CCNC: 64 U/L (ref 34–104)
ALT SERPL W P-5'-P-CCNC: 13 U/L (ref 7–52)
ANION GAP SERPL CALCULATED.3IONS-SCNC: 13 MMOL/L (ref 4–13)
AST SERPL W P-5'-P-CCNC: 19 U/L (ref 13–39)
B-HCG SERPL-ACNC: ABNORMAL MIU/ML (ref 0–11.6)
BASOPHILS # BLD MANUAL: 0 THOUSAND/UL (ref 0–0.1)
BASOPHILS NFR MAR MANUAL: 0 % (ref 0–1)
BILIRUB SERPL-MCNC: 0.59 MG/DL (ref 0.2–1)
BILIRUB UR QL STRIP: NEGATIVE
BUN SERPL-MCNC: 8 MG/DL (ref 5–25)
CALCIUM SERPL-MCNC: 8.1 MG/DL (ref 8.4–10.2)
CHLORIDE SERPL-SCNC: 101 MMOL/L (ref 96–108)
CLARITY UR: CLEAR
CO2 SERPL-SCNC: 21 MMOL/L (ref 21–32)
COLOR UR: YELLOW
CREAT SERPL-MCNC: 0.48 MG/DL (ref 0.6–1.3)
EOSINOPHIL # BLD MANUAL: 0 THOUSAND/UL (ref 0–0.4)
EOSINOPHIL NFR BLD MANUAL: 0 % (ref 0–6)
ERYTHROCYTE [DISTWIDTH] IN BLOOD BY AUTOMATED COUNT: 13.3 % (ref 11.6–15.1)
GFR SERPL CREATININE-BSD FRML MDRD: 129 ML/MIN/1.73SQ M
GLUCOSE SERPL-MCNC: 94 MG/DL (ref 65–140)
GLUCOSE SERPL-MCNC: 99 MG/DL (ref 65–140)
GLUCOSE UR STRIP-MCNC: NEGATIVE MG/DL
HCT VFR BLD AUTO: 40.6 % (ref 34.8–46.1)
HGB BLD-MCNC: 13.3 G/DL (ref 11.5–15.4)
HGB UR QL STRIP.AUTO: NEGATIVE
KETONES UR STRIP-MCNC: ABNORMAL MG/DL
LEUKOCYTE ESTERASE UR QL STRIP: NEGATIVE
LYMPHOCYTES # BLD AUTO: 0.13 THOUSAND/UL (ref 0.6–4.47)
LYMPHOCYTES # BLD AUTO: 1 % (ref 14–44)
MCH RBC QN AUTO: 32.7 PG (ref 26.8–34.3)
MCHC RBC AUTO-ENTMCNC: 32.8 G/DL (ref 31.4–37.4)
MCV RBC AUTO: 100 FL (ref 82–98)
MONOCYTES # BLD AUTO: 0.27 THOUSAND/UL (ref 0–1.22)
MONOCYTES NFR BLD: 2 % (ref 4–12)
NEUTROPHILS # BLD MANUAL: 13.03 THOUSAND/UL (ref 1.85–7.62)
NEUTS SEG NFR BLD AUTO: 97 % (ref 43–75)
NITRITE UR QL STRIP: NEGATIVE
PH UR STRIP.AUTO: 6 [PH]
PLATELET # BLD AUTO: 215 THOUSANDS/UL (ref 149–390)
PLATELET BLD QL SMEAR: ADEQUATE
PMV BLD AUTO: 10.1 FL (ref 8.9–12.7)
POTASSIUM SERPL-SCNC: 3.9 MMOL/L (ref 3.5–5.3)
PROT SERPL-MCNC: 6.6 G/DL (ref 6.4–8.4)
PROT UR STRIP-MCNC: NEGATIVE MG/DL
RBC # BLD AUTO: 4.07 MILLION/UL (ref 3.81–5.12)
RBC MORPH BLD: NORMAL
SODIUM SERPL-SCNC: 135 MMOL/L (ref 135–147)
SP GR UR STRIP.AUTO: 1.02 (ref 1–1.03)
UROBILINOGEN UR QL STRIP.AUTO: 0.2 E.U./DL
WBC # BLD AUTO: 13.43 THOUSAND/UL (ref 4.31–10.16)

## 2022-06-20 PROCEDURE — 99283 EMERGENCY DEPT VISIT LOW MDM: CPT

## 2022-06-20 PROCEDURE — 99284 EMERGENCY DEPT VISIT MOD MDM: CPT | Performed by: PHYSICIAN ASSISTANT

## 2022-06-20 PROCEDURE — 84702 CHORIONIC GONADOTROPIN TEST: CPT | Performed by: PHYSICIAN ASSISTANT

## 2022-06-20 PROCEDURE — 85007 BL SMEAR W/DIFF WBC COUNT: CPT | Performed by: PHYSICIAN ASSISTANT

## 2022-06-20 PROCEDURE — 80053 COMPREHEN METABOLIC PANEL: CPT | Performed by: PHYSICIAN ASSISTANT

## 2022-06-20 PROCEDURE — 81003 URINALYSIS AUTO W/O SCOPE: CPT | Performed by: PHYSICIAN ASSISTANT

## 2022-06-20 PROCEDURE — 96361 HYDRATE IV INFUSION ADD-ON: CPT

## 2022-06-20 PROCEDURE — 87086 URINE CULTURE/COLONY COUNT: CPT | Performed by: PHYSICIAN ASSISTANT

## 2022-06-20 PROCEDURE — 96375 TX/PRO/DX INJ NEW DRUG ADDON: CPT

## 2022-06-20 PROCEDURE — 96366 THER/PROPH/DIAG IV INF ADDON: CPT

## 2022-06-20 PROCEDURE — 36415 COLL VENOUS BLD VENIPUNCTURE: CPT | Performed by: PHYSICIAN ASSISTANT

## 2022-06-20 PROCEDURE — 96365 THER/PROPH/DIAG IV INF INIT: CPT

## 2022-06-20 PROCEDURE — 85027 COMPLETE CBC AUTOMATED: CPT | Performed by: PHYSICIAN ASSISTANT

## 2022-06-20 PROCEDURE — 82948 REAGENT STRIP/BLOOD GLUCOSE: CPT

## 2022-06-20 RX ORDER — ONDANSETRON 2 MG/ML
4 INJECTION INTRAMUSCULAR; INTRAVENOUS ONCE
Status: COMPLETED | OUTPATIENT
Start: 2022-06-20 | End: 2022-06-20

## 2022-06-20 RX ORDER — FAMOTIDINE 10 MG/ML
20 INJECTION, SOLUTION INTRAVENOUS ONCE
Status: COMPLETED | OUTPATIENT
Start: 2022-06-20 | End: 2022-06-20

## 2022-06-20 RX ORDER — ACETAMINOPHEN 325 MG/1
975 TABLET ORAL ONCE
Status: COMPLETED | OUTPATIENT
Start: 2022-06-20 | End: 2022-06-20

## 2022-06-20 RX ORDER — ONDANSETRON 4 MG/1
4 TABLET, ORALLY DISINTEGRATING ORAL EVERY 6 HOURS PRN
Qty: 20 TABLET | Refills: 0 | Status: SHIPPED | OUTPATIENT
Start: 2022-06-20

## 2022-06-20 RX ADMIN — SODIUM CHLORIDE 1000 ML: 0.9 INJECTION, SOLUTION INTRAVENOUS at 09:32

## 2022-06-20 RX ADMIN — FAMOTIDINE 20 MG: 10 INJECTION, SOLUTION INTRAVENOUS at 11:19

## 2022-06-20 RX ADMIN — ACETAMINOPHEN 975 MG: 325 TABLET ORAL at 11:19

## 2022-06-20 RX ADMIN — DEXTROSE 250 ML: 5 SOLUTION INTRAVENOUS at 09:32

## 2022-06-20 RX ADMIN — ONDANSETRON 4 MG: 2 INJECTION INTRAMUSCULAR; INTRAVENOUS at 09:32

## 2022-06-20 NOTE — DISCHARGE INSTRUCTIONS
Stay well hydrated  Start with a bland diet and eat small frequent meals  Avoid spicy, acidic or greasy foods  Tylenol as needed for pain  Gingerale or other forms of ingesting ginger may also help with nausea  You may take Zofran as needed for nausea and vomiting  Follow-up with your OBGYN  Please call today to arrange follow-up  Return to the emergency department with any significant change or worsening of your symptoms especially uterine contractions, gush of fluid or vaginal bleeding

## 2022-06-21 ENCOUNTER — HOSPITAL ENCOUNTER (OUTPATIENT)
Facility: HOSPITAL | Age: 32
Discharge: HOME | End: 2022-06-21
Attending: OBSTETRICS & GYNECOLOGY | Admitting: OBSTETRICS & GYNECOLOGY
Payer: COMMERCIAL

## 2022-06-21 ENCOUNTER — HOSPITAL ENCOUNTER (OUTPATIENT)
Facility: HOSPITAL | Age: 32
End: 2022-06-21
Attending: OBSTETRICS & GYNECOLOGY | Admitting: OBSTETRICS & GYNECOLOGY
Payer: COMMERCIAL

## 2022-06-21 ENCOUNTER — ROUTINE PRENATAL (OUTPATIENT)
Dept: OBGYN CLINIC | Facility: CLINIC | Age: 32
End: 2022-06-21

## 2022-06-21 VITALS
BODY MASS INDEX: 24.49 KG/M2 | DIASTOLIC BLOOD PRESSURE: 68 MMHG | HEART RATE: 90 BPM | RESPIRATION RATE: 18 BRPM | TEMPERATURE: 97.6 F | HEIGHT: 65 IN | WEIGHT: 147 LBS | SYSTOLIC BLOOD PRESSURE: 113 MMHG

## 2022-06-21 VITALS — SYSTOLIC BLOOD PRESSURE: 100 MMHG | WEIGHT: 147.2 LBS | DIASTOLIC BLOOD PRESSURE: 80 MMHG | BODY MASS INDEX: 24.5 KG/M2

## 2022-06-21 DIAGNOSIS — F41.1 GAD (GENERALIZED ANXIETY DISORDER): ICD-10-CM

## 2022-06-21 DIAGNOSIS — Z3A.29 29 WEEKS GESTATION OF PREGNANCY: Primary | ICD-10-CM

## 2022-06-21 LAB — BACTERIA UR CULT: NORMAL

## 2022-06-21 PROCEDURE — 59025 FETAL NON-STRESS TEST: CPT | Performed by: OBSTETRICS & GYNECOLOGY

## 2022-06-21 PROCEDURE — PNV: Performed by: OBSTETRICS & GYNECOLOGY

## 2022-06-21 PROCEDURE — 99203 OFFICE O/P NEW LOW 30 MIN: CPT

## 2022-06-21 PROCEDURE — 99213 OFFICE O/P EST LOW 20 MIN: CPT | Performed by: OBSTETRICS & GYNECOLOGY

## 2022-06-21 RX ORDER — SODIUM CHLORIDE 9 MG/ML
500 INJECTION, SOLUTION INTRAVENOUS ONCE
Status: COMPLETED | OUTPATIENT
Start: 2022-06-21 | End: 2022-06-21

## 2022-06-21 RX ORDER — SENNA AND DOCUSATE SODIUM 50; 8.6 MG/1; MG/1
1 TABLET, FILM COATED ORAL EVERY OTHER DAY
COMMUNITY

## 2022-06-21 RX ADMIN — SODIUM CHLORIDE 500 ML/HR: 0.9 INJECTION, SOLUTION INTRAVENOUS at 13:09

## 2022-06-21 NOTE — PROGRESS NOTES
L&D Triage Note - OB/GYN  Sharron Duarte 33 y o  female MRN: [id number]  Unit/Bed#: L&D 321-01 Encounter: [id number]      Assessment:  33 y o   at 29w6d presenting with abdominal cramping in the setting of recent gastroenteritis  Patient was determined not to be in  labor and discharge home with outpatient follow-up  Plan:  1  Abdominal cramps    labor workup negative   Cervical length of 4 40 cm   NST reactive   Abdominal cramps likely due to dehydration  Abdominal cramps responsive to 1 L fluid bolus  Discharge home with outpatient follow-up   D/W Dr Michelle Luke    ______________________________________________________________________      Chief Complaint: cramping    Subjective:  33 y o   at 29w6d presenting with abdominal cramps for the last day  She states that they started after a recent bout of gastroenteritis  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 and there has been a GI infection spreading  Patient has had vomiting, but is no longer vomiting at this time  She also states that her partner has diarrhea and is recovering from the same infection  She denies nausea/vomiting and diarrhea  Patient endorses that she has not been well hydrated  She denies leakage of fluid, vaginal bleeding, and decreased fetal movement  ROS:   Review of Systems   Constitutional: Negative for chills and fever  HENT: Negative for ear pain and sore throat  Eyes: Negative for pain and visual disturbance  Respiratory: Negative for cough and shortness of breath  Cardiovascular: Negative for chest pain and palpitations  Gastrointestinal: Positive for abdominal pain  Negative for vomiting  Genitourinary: Negative for dysuria and hematuria  Musculoskeletal: Negative for arthralgias and back pain  Skin: Negative for color change and rash  Neurological: Negative for seizures and syncope     All other systems reviewed and are negative  Objective:  Vitals:    06/21/22 1118   BP: 113/68   Pulse: 90   Resp: 18   Temp: 97 6 °F (36 4 °C)       Physical Exam  Constitutional:       Appearance: Normal appearance  Cardiovascular:      Pulses: Normal pulses  Pulmonary:      Effort: Pulmonary effort is normal  No respiratory distress  Abdominal:      Palpations: Abdomen is soft  Tenderness: There is no abdominal tenderness  Neurological:      Mental Status: She is alert  Skin:     General: Skin is warm and dry  Psychiatric:         Mood and Affect: Mood normal          Behavior: Behavior normal    Vitals reviewed  SVE: 0 / 0% / -4  SSE:  Closed appearing cervix with minimal white discharge, no bleeding from the vagina or cervix  FHT:  Reactive  [place]:  No contractions    Wet mount/KOH:  Negative for hyphae, clue cells, or Trichomonas      TVUS:    Cervical length: 4 40cm   Vertex   no funneling, no dynamic changes      Toyin Woodall MD 6/21/2022 1:39 PM

## 2022-06-22 NOTE — PROGRESS NOTES
Erin Hines is a 33y o  year old  at 29 weeks 6 days presenting for ER follow up  She was seen at [facility] from  night into Monday morning secondary to 8 episodes of vomiting  States that the vomiting has since stopped but she continues to have cramps and is unsure if they are contractions  States that time of her evaluation emergency room she was advised to be evaluated for  labor at which time she declined and stated that she would have follow-up with us in the office  States that her pain comes and goes but is not able to be timed  Patient was informed that I am not able to fully evaluate her at our office for  workup as I a m  recommending a fetal fibronectin to be collected and not available at this time  She will also need a transvaginal ultrasound given gestational age to evaluate cervical length  Patient was sent to triage for further evaluation

## 2022-06-25 ENCOUNTER — APPOINTMENT (OUTPATIENT)
Dept: LAB | Facility: HOSPITAL | Age: 32
End: 2022-06-25
Payer: COMMERCIAL

## 2022-06-25 DIAGNOSIS — Z34.93 THIRD TRIMESTER PREGNANCY: ICD-10-CM

## 2022-06-25 LAB — GLUCOSE P FAST SERPL-MCNC: 85 MG/DL (ref 70–105)

## 2022-06-25 PROCEDURE — 82951 GLUCOSE TOLERANCE TEST (GTT): CPT

## 2022-06-25 PROCEDURE — 36415 COLL VENOUS BLD VENIPUNCTURE: CPT

## 2022-06-27 PROBLEM — R73.09 ELEVATED GLUCOSE TOLERANCE TEST: Status: ACTIVE | Noted: 2022-06-27

## 2022-06-27 PROBLEM — Z3A.30 30 WEEKS GESTATION OF PREGNANCY: Status: ACTIVE | Noted: 2022-02-21

## 2022-06-28 ENCOUNTER — ROUTINE PRENATAL (OUTPATIENT)
Dept: OBGYN CLINIC | Facility: CLINIC | Age: 32
End: 2022-06-28

## 2022-06-28 VITALS — WEIGHT: 149.2 LBS | BODY MASS INDEX: 24.83 KG/M2 | DIASTOLIC BLOOD PRESSURE: 62 MMHG | SYSTOLIC BLOOD PRESSURE: 100 MMHG

## 2022-06-28 DIAGNOSIS — Z3A.30 30 WEEKS GESTATION OF PREGNANCY: ICD-10-CM

## 2022-06-28 DIAGNOSIS — Z34.93 THIRD TRIMESTER PREGNANCY: ICD-10-CM

## 2022-06-28 DIAGNOSIS — O35.8XX0 PYELECTASIS OF FETUS ON PRENATAL ULTRASOUND: ICD-10-CM

## 2022-06-28 DIAGNOSIS — O21.9 NAUSEA AND VOMITING IN PREGNANCY: ICD-10-CM

## 2022-06-28 DIAGNOSIS — R73.09 ELEVATED GLUCOSE TOLERANCE TEST: Primary | ICD-10-CM

## 2022-06-28 DIAGNOSIS — F33.0 MILD EPISODE OF RECURRENT MAJOR DEPRESSIVE DISORDER (HCC): ICD-10-CM

## 2022-06-28 PROCEDURE — PNV: Performed by: ADVANCED PRACTICE MIDWIFE

## 2022-06-28 NOTE — PROGRESS NOTES
Routine Prenatal Visit  [facility] [address] [place], [place]    Assessment/Plan:  Bashir Rm is a 33y o  year old  at 27w9d who presents for routine prenatal visit  1  Elevated glucose tolerance test  Assessment & Plan:  - unable to tolerate - glucola  - can do fastings and 2 hr PP after each meal for 7 days  Orders:  -     glucose blood test strip; Use as instructed  -     Blood Glucose Monitoring Suppl (Contour Next Gen Monitor) w/Device KIT; Use 1 kit once for 1 dose  -     Microlet Lancets MISC; Use 4 (four) times a day    2  30 weeks gestation of pregnancy  Assessment & Plan:  - Reviewed birth plan ? ??   - [address] follow-up 2022  - Next visit 2 weeks      Orders:  -     glucose blood test strip; Use as instructed  -     Blood Glucose Monitoring Suppl (Contour Next Gen Monitor) w/Device KIT; Use 1 kit once for 1 dose  -     Microlet Lancets MISC; Use 4 (four) times a day    3  Mild episode of recurrent major depressive disorder ([facility] [address] )    4  Pyelectasis of fetus on prenatal ultrasound  Assessment & Plan:  Follow-up ultrasound with [facility]      5  Third trimester pregnancy  -     glucose blood test strip; Use as instructed  -     Blood Glucose Monitoring Suppl (Contour Next Gen Monitor) w/Device KIT; Use 1 kit once for 1 dose  -     Microlet Lancets MISC; Use 4 (four) times a day    6  Nausea and vomiting in pregnancy  -     glucose blood test strip; Use as instructed  -     Blood Glucose Monitoring Suppl (Contour Next Gen Monitor) w/Device KIT; Use 1 kit once for 1 dose  -     Microlet Lancets MISC; Use 4 (four) times a day    Subjective:     CC: Prenatal care    Ivan Houser is a 33 y o   female who presents for routine prenatal care at 30w6d  Pregnancy ROS: No leakage of fluid, pelvic pain, or vaginal bleeding  Good fetal movement  In the past pregnancy - her fasting levels were good but her 2 hrs were high   Will order glucometer and have her do testing for a week  The following portions of the patient's history were reviewed and updated as appropriate: allergies, current medications, past family history, past medical history, obstetric history, gynecologic history, past social history, past surgical history and problem list       Objective:  /62   Wt 67 7 kg (149 lb 3 2 oz)   LMP 2021   BMI 24 83 kg/m²   Pregravid Weight/BMI: 57 2 kg (126 lb) (BMI 20 97)  Current Weight: 67 7 kg (149 lb 3 2 oz)   Total Weight Gain: 10 5 kg (23 lb 3 2 oz)   Pre- Vitals    Flowsheet Row Most Recent Value   Prenatal Assessment    Fetal Heart Rate 140   Movement Present   Prenatal Vitals    Blood Pressure 100/62   Weight - Scale 67 7 kg (149 lb 3 2 oz)   Urine Albumin/Glucose    Dilation/Effacement/Station    Vaginal Drainage    Edema    LLE Edema None   RLE Edema None           General: Well appearing, no distress  Respiratory: Unlabored breathing  Cardiovascular: Regular rate  Abdomen: Soft, gravid, nontender  Fundal Height: Appropriate for gestational age  Extremities: Warm and well perfused  Non tender

## 2022-06-29 DIAGNOSIS — Z3A.29 29 WEEKS GESTATION OF PREGNANCY: ICD-10-CM

## 2022-06-29 DIAGNOSIS — R73.09 ELEVATED GLUCOSE TOLERANCE TEST: Primary | ICD-10-CM

## 2022-06-29 DIAGNOSIS — O21.9 NAUSEA AND VOMITING IN PREGNANCY: ICD-10-CM

## 2022-06-29 DIAGNOSIS — Z34.93 THIRD TRIMESTER PREGNANCY: ICD-10-CM

## 2022-06-29 RX ORDER — BLOOD-GLUCOSE METER
1 KIT MISCELLANEOUS ONCE
Qty: 1 KIT | Refills: 0 | Status: SHIPPED | OUTPATIENT
Start: 2022-06-29 | End: 2022-06-29

## 2022-06-29 RX ORDER — BLOOD-GLUCOSE METER
1 EACH MISCELLANEOUS ONCE
Qty: 1 KIT | Refills: 0 | Status: CANCELLED | OUTPATIENT
Start: 2022-06-29 | End: 2022-06-29

## 2022-06-29 RX ORDER — LANCETS
EACH MISCELLANEOUS 4 TIMES DAILY
Qty: 100 EACH | Refills: 1 | Status: CANCELLED | OUTPATIENT
Start: 2022-06-29

## 2022-06-29 RX ORDER — LANCETS
EACH MISCELLANEOUS 4 TIMES DAILY
Qty: 100 EACH | Refills: 0 | Status: SHIPPED | OUTPATIENT
Start: 2022-06-29 | End: 2022-07-13 | Stop reason: SDUPTHER

## 2022-06-29 RX ORDER — BLOOD-GLUCOSE METER
1 KIT MISCELLANEOUS ONCE
Qty: 1 KIT | Refills: 0 | Status: CANCELLED | OUTPATIENT
Start: 2022-06-29 | End: 2022-06-29

## 2022-07-07 ENCOUNTER — ULTRASOUND (OUTPATIENT)
Dept: PERINATAL CARE | Facility: OTHER | Age: 32
End: 2022-07-07
Payer: COMMERCIAL

## 2022-07-07 VITALS
BODY MASS INDEX: 24.66 KG/M2 | HEIGHT: 65 IN | SYSTOLIC BLOOD PRESSURE: 109 MMHG | HEART RATE: 96 BPM | DIASTOLIC BLOOD PRESSURE: 70 MMHG | WEIGHT: 148 LBS

## 2022-07-07 DIAGNOSIS — Z36.89 ENCOUNTER FOR ULTRASOUND TO CHECK FETAL GROWTH: ICD-10-CM

## 2022-07-07 DIAGNOSIS — O24.419 GESTATIONAL DIABETES MELLITUS (GDM) IN THIRD TRIMESTER, GESTATIONAL DIABETES METHOD OF CONTROL UNSPECIFIED: Primary | ICD-10-CM

## 2022-07-07 DIAGNOSIS — O35.EXX0 PYELECTASIS OF FETUS ON PRENATAL ULTRASOUND: ICD-10-CM

## 2022-07-07 PROCEDURE — 76816 OB US FOLLOW-UP PER FETUS: CPT | Performed by: OBSTETRICS & GYNECOLOGY

## 2022-07-07 PROCEDURE — 99213 OFFICE O/P EST LOW 20 MIN: CPT | Performed by: OBSTETRICS & GYNECOLOGY

## 2022-07-07 NOTE — PATIENT INSTRUCTIONS
Thank you for choosing us for your  care today  If you have any questions about your ultrasound or care, please do not hesitate to contact us or your primary obstetrician  Some general instructions for your pregnancy are:    Protect against coronavirus: get vaccinated - pregnant women are increased risk of severe COVID  Notify your primary care doctor if you have any symptoms  Exercise: Aim for 22 minutes per day (150 minutes per week) of regular exercise  Walking is great! Nutrition: aim for calcium-rich and iron-rich foods as well as healthy sources of protein  Learn about Preeclampsia: preeclampsia is a common, serious high blood pressure complication in pregnancy  A blood pressure of 306IUDH (systolic or top number) or 20OEEG (diastolic or bottom number) is not normal and needs evaluation by your doctor  Aspirin is sometimes prescribed in early pregnancy to prevent preeclampsia in women with risk factors - ask your obstetrician if you should be on this medication  If you smoke, try to reduce how many cigarettes you smoke or try to quit completely  Do not vape  Other warning signs to watch out for in pregnancy or postpartum: chest pain, obstructed breathing or shortness of breath, seizures, thoughts of hurting yourself or your baby, bleeding, a painful or swollen leg, fever, or headache (see [facility] POST-BIRTH Warning Signs campaign)  If these happen call 911  Itching is also not normal in pregnancy and if you experience this, especially over your hands and feet, potentially worse at night, notify your doctors

## 2022-07-12 NOTE — PROGRESS NOTES
Meter Education (in-person office visit )    Thank you for referring your patient to [facility] Maternal Fetal Medicine Diabetes in Pregnancy Program      Yoan Shipley is a  33 y o  female who presents today alone for Meter Education  Patient is at [address] [facility] gestation, Estimated Date of Delivery: 8/31/22  Reviewed and updated the following from patients medical record: PMH, Problem List, Allergies, and Current Medications  Visit Diagnosis:  Encounter Diagnosis     ICD-10-CM    1  Elevated glucose tolerance test  R73 09 Mychart glucose flowsheet    1hr GTT (6/16/22) = 166 (H)  Unable to Tolerate Glucola for 3hr Test  Referred to [facility] for 1 Week of BG Testing (x4 Daily)   2  History of insulin controlled gestational diabetes mellitus  Z86 32 Mychart glucose flowsheet    Pregnancy [id number]   Previous Insulin Regimen per CRNP note (1/3/2020)   4 units Novolog (before dinner)  30 units Levemir (inject at 8am)   3  33 weeks gestation of pregnancy  Z3A 33 Mychart glucose flowsheet        Reason for meter education: Unable to Tolerate 3hr Glucola; Abnormal 1hr GTT    Labs    Lab Results   Component Value Date    [id number] 166 (H) 06/16/2022    Lab Results   Component Value Date    GLUF 85 06/25/2022    Lab Results   Component Value Date    HGBA1C 5 1 04/02/2022        Orders Placed This Encounter   Procedures    Mychart glucose flowsheet     Order Specific Question:   After how many days would you like to receive a notification of this patient's flowsheet entries?      Answer:   8   **Also ordered refills for lancets and strips for pt to continue to monitoring blood glucose levels for the remainder of pregnancy     Medications/Pharmacy:    Current Medications Preferred Pharmacy     Current Outpatient Medications:     docusate sodium (COLACE) 100 mg capsule, Take 100 mg by mouth 2 (two) times a day as needed  , Disp: , Rfl:     glucose blood test strip, Use as instructed, Disp: 50 strip, Rfl: 1    Microlet Lancets MISC, Use 4 (four) times a day, Disp: 100 each, Rfl: 0    Prenatal Vit-Fe Fumarate-FA (PRENATAL VITAMINS PO), Take by mouth, Disp: , Rfl:     senna-docusate sodium (SENOKOT-S) 8 6-50 mg per tablet, Take 1 tablet by mouth every other day, Disp: , Rfl:     sertraline (Zoloft) 50 mg tablet, Take 1 tablet (50 mg total) by mouth daily With 25 mg, Disp: 90 tablet, Rfl: 0    ondansetron (Zofran ODT) 4 mg disintegrating tablet, Take 1 tablet (4 mg total) by mouth every 6 (six) hours as needed for nausea or vomiting (Patient not taking: No sig reported), Disp: 20 tablet, Rfl: [address] [address]  Phone: [phone] Fax: [phone]    [facility] [address], [address] [place] [address]  Phone: [phone] Fax: [phone]      **Prefers the [address]       Anthropometrics:    Height  Pre-Gravid Weight/BMI Last 3 Weights TWG   Ht Readings from Last 3 Encounters:   07/13/22 5' 5 5" (1 664 m)   07/07/22 5' 5" (1 651 m)   06/21/22 5' 5" (1 651 m)    Wt: 57 2 kg (126 lb)  BMI: 20 64 Wt Readings from Last 3 Encounters:   07/13/22 67 5 kg (148 lb 12 8 oz)   07/07/22 67 1 kg (148 lb)   06/28/22 67 7 kg (149 lb 3 2 oz)    9 979 kg (22 lb)     Total Pregnancy Weight Gain Recommendations: BMI (18 5-24 9) 25-35 lbs  Current Wt Status Compared to Recommendations: Within Range      *Pt reports no wt gain in last month since resuming GDM diet (previously educated in 2019)    Most Recent Ultrasound Results:    Date: Fetal Growth LARISSA   7/7/22 Normal Normal     Next US date: To Be Scheduled;  Informed at End of Visit    Blood Glucose Monitoring:     Glucose Meter: Contour Next EZ   *OB Ordered Contour Next on 6/28/22 - Pt has been testing since receiving the meter and has provided blood sugars up to 6/30/22       BG Log:    Date Fasting Post-  breakfast Post-  lunch Post-  dinner Comments:    22 89 121      22 79  94 118    22 84   115    7/10/22 78 94 106     22 81 82 94 153  *[employer] (8 piece nugget with ketchup)    22 82  108 109    22 76   103    22 81 81 133  *roast turkey and soto BLT on wheat bread ([employer]) 91    22 88 109 112 119    22 96 89 101 125      7/3/22 89  116 103   Had a bowl of honey nut cheerios prior to bed time   22 90 106 115 96    22 92 106 146  *Tacos (homemade)   - 2 carb balanced tortillas, sour cream, corn, cheese 110    22 85 119 84 131      Review of Patients Diet:   Diet Type: Following GDM diet (previously educated on in 2019 during previous pregnancy w/ GDM)  Eating every 2-3hrs   Staying away from desserts  Having 40g CHO (pt reports she read this on the internet)   Pairing all carbohydrates with a protein source   Limiting fruit (pairing with protein when included in diet)     Instructed on Testing Blood Sugars: 4 x per day (Fasting, 2 hour after start of each meal)  Gave instruction on site selection, skin preparation, loading strips and lancet device, meter activation, obtaining blood sample, test strip and lancet disposal and storage, and recording log book entries  Patient has good understanding of material covered and was able to test their own blood sugar in office today       POCT Finger Stick BG  (During Demo in Office) Last Meal    Time: 2:04pm  Time: 9:30am    Bmg/dl  Description: Breakfast (details not provided at this time)    *Has eaten two packs of peanuts since then d/t having to drive 1hr and waiting for visit     Instructed to report blood sugar results weekly via Phone: [phone] OR My Chart (Message with image attachment, or Glucose Flowsheet)  Goal Blood Sugar Ranges:   Fastin-90 mg/dL  1 hour after the start of each meal: 140 mg/dL or less  2 hours after start of each meal: 120 mg/dL or less    Date to Report Blood Sugars: 1 Week   *Will provide blood glucose log to OB for possible GDM dx     Begin Time: 1:25pm    End Time: 2:10pm    It was a pleasure working with them today  Please feel free to call with any questions or concerns      Shahnaz Price RD  Diabetes Educator  [facility]  Diabetes in Pregnancy Program  [address] [place] [address],[address]  [address]

## 2022-07-12 NOTE — PROGRESS NOTES
[address]: Ms Evette Platt was seen today for fetal growth assessment ultrasound  See ultrasound report under "OB Procedures" tab  The time spent on this established patient on the encounter date included 5 minutes previsit service time reviewing records and precharting, 10 minutes face-to-face service time counseling regarding results and coordinating care, and  5 minutes charting, totalling 20 minutes  Please don't hesitate to contact our office with any concerns or questions    Pietro Alexis MD

## 2022-07-13 ENCOUNTER — OFFICE VISIT (OUTPATIENT)
Dept: PERINATAL CARE | Facility: CLINIC | Age: 32
End: 2022-07-13
Payer: COMMERCIAL

## 2022-07-13 VITALS
DIASTOLIC BLOOD PRESSURE: 72 MMHG | BODY MASS INDEX: 23.91 KG/M2 | SYSTOLIC BLOOD PRESSURE: 115 MMHG | WEIGHT: 148.8 LBS | HEIGHT: 66 IN | HEART RATE: 89 BPM

## 2022-07-13 DIAGNOSIS — Z3A.30 30 WEEKS GESTATION OF PREGNANCY: ICD-10-CM

## 2022-07-13 DIAGNOSIS — Z3A.33 33 WEEKS GESTATION OF PREGNANCY: ICD-10-CM

## 2022-07-13 DIAGNOSIS — Z34.93 THIRD TRIMESTER PREGNANCY: ICD-10-CM

## 2022-07-13 DIAGNOSIS — Z86.32 HISTORY OF INSULIN CONTROLLED GESTATIONAL DIABETES MELLITUS: ICD-10-CM

## 2022-07-13 DIAGNOSIS — R73.09 ELEVATED GLUCOSE TOLERANCE TEST: Primary | ICD-10-CM

## 2022-07-13 DIAGNOSIS — O21.9 NAUSEA AND VOMITING IN PREGNANCY: ICD-10-CM

## 2022-07-13 PROCEDURE — G0108 DIAB MANAGE TRN  PER INDIV: HCPCS

## 2022-07-13 RX ORDER — PERPHENAZINE 16 MG/1
TABLET, FILM COATED ORAL
Qty: 100 STRIP | Refills: 3 | Status: SHIPPED | OUTPATIENT
Start: 2022-07-13 | End: 2022-08-27

## 2022-07-13 RX ORDER — LANCETS
EACH MISCELLANEOUS
Qty: 100 EACH | Refills: 3 | Status: SHIPPED | OUTPATIENT
Start: 2022-07-13 | End: 2022-08-31

## 2022-07-15 ENCOUNTER — ROUTINE PRENATAL (OUTPATIENT)
Dept: OBGYN CLINIC | Facility: CLINIC | Age: 32
End: 2022-07-15

## 2022-07-15 ENCOUNTER — DOCUMENTATION (OUTPATIENT)
Dept: BEHAVIORAL/MENTAL HEALTH CLINIC | Facility: CLINIC | Age: 32
End: 2022-07-15

## 2022-07-15 VITALS — BODY MASS INDEX: 24.81 KG/M2 | WEIGHT: 151.4 LBS | SYSTOLIC BLOOD PRESSURE: 98 MMHG | DIASTOLIC BLOOD PRESSURE: 60 MMHG

## 2022-07-15 DIAGNOSIS — F41.1 GAD (GENERALIZED ANXIETY DISORDER): ICD-10-CM

## 2022-07-15 DIAGNOSIS — F33.0 MILD EPISODE OF RECURRENT MAJOR DEPRESSIVE DISORDER (HCC): Primary | ICD-10-CM

## 2022-07-15 DIAGNOSIS — Z3A.33 33 WEEKS GESTATION OF PREGNANCY: ICD-10-CM

## 2022-07-15 DIAGNOSIS — O35.EXX0 PYELECTASIS OF FETUS ON PRENATAL ULTRASOUND: ICD-10-CM

## 2022-07-15 DIAGNOSIS — Z34.93 THIRD TRIMESTER PREGNANCY: Primary | ICD-10-CM

## 2022-07-15 DIAGNOSIS — O24.419 GESTATIONAL DIABETES MELLITUS (GDM) IN THIRD TRIMESTER, GESTATIONAL DIABETES METHOD OF CONTROL UNSPECIFIED: ICD-10-CM

## 2022-07-15 PROBLEM — M79.641 RIGHT HAND PAIN: Status: RESOLVED | Noted: 2021-12-04 | Resolved: 2022-07-15

## 2022-07-15 PROCEDURE — PNV: Performed by: ADVANCED PRACTICE MIDWIFE

## 2022-07-15 RX ORDER — BLOOD-GLUCOSE METER
EACH MISCELLANEOUS
COMMUNITY
Start: 2022-06-29

## 2022-07-15 NOTE — ASSESSMENT & PLAN NOTE
- visits scheduled with diabetes in pregnancy program  - Avg fbs- 80     - She had first visit with educator  - States that she is testing and trying to follow- diet and see what is triggers some of her elevations           Lab Results   Component Value Date    HGBA1C 5 1 04/02/2022

## 2022-07-15 NOTE — PROGRESS NOTES
Routine Prenatal Visit  [facility] [place], [place]    Assessment/Plan:  Tyson Jewell is a 33y o  year old  at 33w2d who presents for routine prenatal visit  1  Third trimester pregnancy    2  33 weeks gestation of pregnancy  Assessment & Plan:  - reviewed s/s PTL, [address]  - growth scan 2022  - Next visit- 2 weeks needs GBS      3  Gestational diabetes mellitus (GDM) in third trimester, gestational diabetes method of control unspecified  Assessment & Plan:  - visits scheduled with diabetes in pregnancy program  - Avg fbs- [de-identified]     - She had first visit with educator  - States that she is testing and trying to follow- diet and see what is triggers some of her elevations  Lab Results   Component Value Date    HGBA1C 5 1 2022         4  Pyelectasis of fetus on prenatal ultrasound  Assessment & Plan:  Most measurements in range on u/s 2022  - has follow-up visit for growth and check renal pelvis           Subjective:     CC: Prenatal care    Patsy Boeck is a 33 y o   female who presents for routine prenatal care at 33w2d  Pregnancy ROS: no leakage of fluid, pelvic pain, or vaginal bleeding  Good fetal movement  Trying to stay well hydrated with water   Based on elevations of when doing screening sugars while eating as  and growth at 72nd% and hx of gestational diabetes will treat as ges    The following portions of the patient's history were reviewed and updated as appropriate: allergies, current medications, past family history, past medical history, obstetric history, gynecologic history, past social history, past surgical history and problem list       Objective:  BP 98/60   Wt 68 7 kg (151 lb 6 4 oz)   LMP 2021   BMI 24 81 kg/m²   Pregravid Weight/BMI: 57 2 kg (126 lb) (BMI 20 64)  Current Weight: 68 7 kg (151 lb 6 4 oz)   Total Weight Gain: 11 5 kg (25 lb 6 4 oz)   Pre- Vitals    Flowsheet Row Most Recent Value   Prenatal Assessment    Fetal Heart Rate 124   Fundal Height (cm) 32 cm   Movement Present   Prenatal Vitals    Blood Pressure 98/60   Weight - Scale 68 7 kg (151 lb 6 4 oz)   Urine Albumin/Glucose    Dilation/Effacement/Station    Vaginal Drainage    Edema    LLE Edema None   RLE Edema None           General: Well appearing, no distress  Respiratory: Unlabored breathing  Cardiovascular: Regular rate  Abdomen: Soft, gravid, nontender  Fundal Height: Appropriate for gestational age  Extremities: Warm and well perfused  Non tender

## 2022-07-15 NOTE — ASSESSMENT & PLAN NOTE
- reviewed s/s PTL, [facility]  - growth scan 8/1/2022  - Next visit- 2 weeks needs GBS

## 2022-07-15 NOTE — PROGRESS NOTES
This note was not shared with the patient due to this is a psychotherapy note      Assessment/Plan:      Diagnoses and all orders for this visit:    Mild episode of recurrent major depressive disorder ([facility] [address] )    PEPE (generalized anxiety disorder)          Subjective:     Patient ID: Bard Gómez is a 33 y o  female  Outpatient Discharge Summary:     Admission Date: 21  Toribio Fried was referred by Vamsi Harvey PA-C  Discharge Date: 7/15/22    Discharge Diagnosis:    1  Mild episode of recurrent major depressive disorder ([facility] [address] )     2  PEPE (generalized anxiety disorder)         Treating Physician: Vamsi Harvey PA-C  Treatment Complications: Toribio Fried identified her daughter was a distraction during sessions    Presenting Problem:   Per Vamsi Harvey PA-C 10/27/20:     30 yo  female, employed, 7 mth old daughter, with hx of depression and anxiety, referred by PCP   Started treatment for anxiety and depression in 2017 with PCP - [facility]  At that time was having spontaneous onset panic symptoms resulting in ED visit , briefly seen at [facility] and then [facility]) x 10 days- discharged on lexapro and trazodone    Did pretty well for awhile   Lexapro changed to zoloft last year secondary to pregnancy   Is currently on 125 mg   Recently restarted on trazodone 50 mg by PCP  Kingsley Luo does breastfeed her daughter Sandrine Lyle at night and on days she is not working          Primary symptoms reported today: anxiety accompanied by apprehension, excessive worry, heart racing, racing/ruminating thoughts, muscle tension, inability to relax   Has hx of excessive cleaning and checking but this is decreased on meds  Feels overwhelmed at times and was tearful up to 3 mths post-partum  Denies SI  Denies loss of interest or panic   Gets easily tired and describes low frustration tolerance   Anxiety is worse when she is not sleeping    Denies D&A          Stressors:  Sandrine Lyle -first child- born in ; new job 2-3 weeks ago; strained relationship with mom and parents going thru a divorce  Jama Voss relies on her mother to watch [employer] when she is working and mom will stay overnight on these days       Per this writer 1/22/21:  Michele Donato is a 26year old female referred to psychotherapy by her OP provider, Andressa Torres  She does not have a hx of IP psychiatric treatment  She has a hx of PHP through [facility]  She has a hx of therapy in the past and did not find it helpful  She states, "I know the tools, they don't help "  She reports her anxiety worsened about 4-5 years ago after a break up and moving in with her brother who was an addict  She describes it as "a bad living situation" and "codependent relationship "  She feels her brother was the trigger to her anxiety  She then met her now  and describes her life as "everything being perfect" but having panic attacks daily, even when watching tv  She started on psychiatric medications for her anxiety at that time and reports an improvement in symptoms  Her anxiety then worsened a little over 1 year ago when she was in her third trimester of being pregnant, then after giving birth it worsened more  Her baby is now 3year old  Today, she reports constant worry, difficulty letting things go, poor appetite, and weight loss  Her weight loss is a concern for her as she weighed 138 prior to being pregnant, 170 while pregnant, and 122 a year after giving birth  Her stressors include her relationship with her mother who Yasmine Patrick describes as "crazy", her relationship with her brother who is an addict, not liking her new position at [employer] [id number], and her baby who has sleep difficulties  Yasmine Patrick identifies her  and girlfriends as supportive  Course of treatment includes:    psychoeducation, individual therapy  and use of ComPsych     Treatment Progress: farrukh Patrick attended 24 virtual psychotherapy sessions with this clinician   Her treatment goals focused on decreasing her anxiety and worry, improve her interpersonal skills, and improve coping tools  Tyson Jewell demonstrated openness to engage in therapy by regular attendance, communication of her thoughts and feelings  She felt comfortable enough to share her stressors regarding her relationships, her job, and her views of herself and shared her successes and challenges of her implementation of wellness tools to help alleviate anxiety and depression in these areas  Through treatment, she verbalized a greater acceptance of her relationship with her mother, a decrease in emotional reactivity, improvement in impulse control, and improvement in expressing her thoughts, feelings, wants, and needs more effectively  Modalities used: CBT, behavior modification, insight oriented therapy, psychoeducation,mindfulness, communication skills, stress management skills, assertiveness skills, DBT skills, emotional needs meeting         Criteria for Discharge: completed treatment goals and objectives and is no longer in need of services, Tyson Jewell was not seen in the office in over 3 months, our  staff called her and she felt she was no longer in need of services at this time, felt her daughter was also a distraction in sessions with no other childcare available    Aftercare recommendations include   Continue OP psychiatry with Grant Pearl PA-C    Discharge Medications include:  Current Outpatient Medications:     Contour Next Test test strip, Test x4 a day or as instructed, Disp: 100 strip, Rfl: 3    docusate sodium (COLACE) 100 mg capsule, Take 100 mg by mouth 2 (two) times a day as needed  , Disp: , Rfl:     Microlet Lancets MISC, Test x4 a day or as instructed, Disp: 100 each, Rfl: 3    ondansetron (Zofran ODT) 4 mg disintegrating tablet, Take 1 tablet (4 mg total) by mouth every 6 (six) hours as needed for nausea or vomiting (Patient not taking: No sig reported), Disp: 20 tablet, Rfl: 0    Prenatal Vit-Fe Fumarate-FA (PRENATAL VITAMINS PO), Take by mouth, Disp: , Rfl:     senna-docusate sodium (SENOKOT-S) 8 6-50 mg per tablet, Take 1 tablet by mouth every other day, Disp: , Rfl:     sertraline (Zoloft) 50 mg tablet, Take 1 tablet (50 mg total) by mouth daily With 25 mg, Disp: 90 tablet, Rfl: 0    Prognosis: good

## 2022-07-22 ENCOUNTER — DOCUMENTATION (OUTPATIENT)
Dept: PERINATAL CARE | Facility: CLINIC | Age: 32
End: 2022-07-22

## 2022-07-22 NOTE — PROGRESS NOTES
Date: 07/22/22  Zulay Goodrich  1990  Estimated Date of Delivery: 8/31/22  34w2d  OB/GYN: [facility]  Patient completed 1 week of SMBG to rule of GDM  Additional Pregnancy Complications: history of insulin controlled GDM 2019   Completed meter education on 7/13/22 with Greg Messer  Patient was unable to tolerate glucola for 3 hr test  Referred to [facility] for 1 week of BG testing        Plan: suggested patient schedule diabetes education due to elevated 2 hr pp and Dr Mary Pearson ultrasound note  In-basket message also sent to Gustavo Aguilar to contact patient if needed to schedule class 1 and 2 combo due to history of insulin controlled GDM  Diet: : Patient was advised to not change diet while testing  Testing blood sugars: 4 x per day (Fasting, 2 hour after start of each meal) for 1 wk to rule out GDM   Noted testing pp was starting at the end of the meal therefore 2 hr pp may have been higher if at the start of the mal    Meter: Contour Next EZ  Activity: Walks 30 minutes daily, follow OB recommendations     Support System: Significant Other / family     Education:  Meter ed on 7/13/22    Weight Change:    Pre-gravid weight: 57 2 kg (126 lb)  11 5 kg (25 lb 6 4 oz)  Weight gain recommendations: BMI (18 5-24 9) 25-35 lbs    Ultrasounds  7/7/22 Normal growth and LARISSA   EFW: 72 % AC: 86 31 % HC: 31 %  Per US notes: I recommend formal Diabetes in Pregnancy referral for reporting of blood  sugars as she is informally following a GDM diet and having some  inconsistent postprandial elevations   -I advised a followup in 4 weeks for presumed GDM  Next US scheduled for 8/1/22    Labs  Lab Results   Component Value Date    [id number] 166 (H) 06/16/2022     Lab Results   Component Value Date    GLUF 85 06/25/2022     Lab Results   Component Value Date    HGBA1C 5 1 04/02/2022         Further fetal surveillance  Beginning at 32 weeks, NST / LARISSA twice a week, if indicated    Bill Dues, RD,LDN,CDE  Diabetes Education

## 2022-07-25 ENCOUNTER — TELEPHONE (OUTPATIENT)
Dept: PERINATAL CARE | Facility: CLINIC | Age: 32
End: 2022-07-25

## 2022-07-25 NOTE — TELEPHONE ENCOUNTER
Called and lvm to schedule pt for class 2 with dietitians  Pt is already scheduled for class 1 on 8/2

## 2022-07-28 ENCOUNTER — ROUTINE PRENATAL (OUTPATIENT)
Dept: OBGYN CLINIC | Facility: MEDICAL CENTER | Age: 32
End: 2022-07-28

## 2022-07-28 VITALS — DIASTOLIC BLOOD PRESSURE: 70 MMHG | BODY MASS INDEX: 24.58 KG/M2 | WEIGHT: 150 LBS | SYSTOLIC BLOOD PRESSURE: 110 MMHG

## 2022-07-28 DIAGNOSIS — Z3A.36 36 WEEKS GESTATION OF PREGNANCY: Primary | ICD-10-CM

## 2022-07-28 PROCEDURE — 87150 DNA/RNA AMPLIFIED PROBE: CPT | Performed by: OBSTETRICS & GYNECOLOGY

## 2022-07-28 PROCEDURE — PNV: Performed by: OBSTETRICS & GYNECOLOGY

## 2022-07-28 NOTE — PROGRESS NOTES
Curt Ngo is a 33y o  year old  at 35w1d for routine prenatal visit  GBS done Yes  PCN allergy No  Labor precautions given  [address]     Being managed as GDM - per MFM last report , states FBS all normal it is her 2 hr PP that are all over the place, has scheduled follow up with diabetic in pregnancy program  Today we discussed possible IOL from 39 weeks on as she is worried growth of baby / will follow up after next scan which is scheduled

## 2022-07-30 LAB — GP B STREP DNA SPEC QL NAA+PROBE: NEGATIVE

## 2022-08-01 ENCOUNTER — ULTRASOUND (OUTPATIENT)
Dept: PERINATAL CARE | Facility: OTHER | Age: 32
End: 2022-08-01
Payer: COMMERCIAL

## 2022-08-01 VITALS
WEIGHT: 151.4 LBS | BODY MASS INDEX: 24.33 KG/M2 | SYSTOLIC BLOOD PRESSURE: 104 MMHG | HEIGHT: 66 IN | HEART RATE: 80 BPM | DIASTOLIC BLOOD PRESSURE: 72 MMHG

## 2022-08-01 DIAGNOSIS — Z36.4 ULTRASOUND FOR ANTENATAL SCREENING FOR FETAL GROWTH RESTRICTION: ICD-10-CM

## 2022-08-01 DIAGNOSIS — O35.EXX0 ENCOUNTER FOR REPEAT ULTRASOUND OF FETAL PYELECTASIS IN SINGLETON PREGNANCY, ANTEPARTUM: Primary | ICD-10-CM

## 2022-08-01 DIAGNOSIS — Z3A.35 35 WEEKS GESTATION OF PREGNANCY: ICD-10-CM

## 2022-08-01 DIAGNOSIS — O36.63X0 LARGE FOR GESTATIONAL AGE FETUS AFFECTING MOTHER, ANTEPARTUM, THIRD TRIMESTER, SINGLE GESTATION: ICD-10-CM

## 2022-08-01 PROBLEM — O35.8XX0 ENCOUNTER FOR REPEAT ULTRASOUND OF FETAL PYELECTASIS IN SINGLETON PREGNANCY, ANTEPARTUM: Status: ACTIVE | Noted: 2022-08-01

## 2022-08-01 PROCEDURE — 99214 OFFICE O/P EST MOD 30 MIN: CPT | Performed by: OBSTETRICS & GYNECOLOGY

## 2022-08-01 PROCEDURE — 76816 OB US FOLLOW-UP PER FETUS: CPT | Performed by: OBSTETRICS & GYNECOLOGY

## 2022-08-01 NOTE — PATIENT INSTRUCTIONS
Kick Counts in Pregnancy   WHAT YOU NEED TO KNOW:   Kick counts measure how much your baby is moving in your womb  A kick from your baby can be felt as a twist, turn, swish, roll, or jab  It is common to feel your baby kicking at 26 to 28 weeks of pregnancy  You may feel your baby kick as early as 20 weeks of pregnancy  You may want to start counting at 28 weeks  DISCHARGE INSTRUCTIONS:   Contact your doctor immediately if:   You feel a change in the number of kicks or movements of your baby  You feel fewer than 10 kicks within 2 hours  You have questions or concerns about your baby's movements  Why measure kick counts:  Your baby's movement may provide information about your baby's health  He or she may move less, or not at all, if there are problems  Your baby may move less if he or she is not getting enough oxygen or nutrition from the placenta  Do not smoke while you are pregnant  Smoking decreases the amount of oxygen that gets to your baby  Talk to your healthcare provider if you need help to quit smoking  Tell your healthcare provider as soon as you feel a change in your baby's movements  When to measure kick counts:   Measure kick counts at the same time every day  Measure kick counts when your baby is awake and most active  Your baby may be most active in the evening  How to measure kick counts:  Check that your baby is awake before you measure kick counts  You can wake up your baby by lightly pushing on your belly, walking, or drinking something cold  Your healthcare provider may tell you different ways to measure kick counts  You may be told to do the following:  Use a chart or clock to keep track of the time you start and finish counting  Sit in a chair or lie on your left side  Place your hands on the largest part of your belly  Count until you reach 10 kicks  Write down how much time it takes to count 10 kicks  It may take 30 minutes to 2 hours to count 10 kicks  It should not take more than 2 hours to count 10 kicks  Follow up with your doctor as directed:  Write down your questions so you remember to ask them during your visits  © Copyright [employer] 2022 Information is for End User's use only and may not be sold, redistributed or otherwise used for commercial purposes  All illustrations and images included in CareNotes® are the copyrighted property of A D A M , Inc  or [facility] Raiza Roberts   The above information is an  only  It is not intended as medical advice for individual conditions or treatments  Talk to your doctor, nurse or pharmacist before following any medical regimen to see if it is safe and effective for you

## 2022-08-01 NOTE — LETTER
August 1, 2022     PRISCILLA Burns  [address]  [address]    Patient: Majo James   YOB: 1990   Date of Visit: 8/1/2022       Dear Dr Nohelia Rivera: Thank you for referring Majo James to me for evaluation  Below are my notes for this consultation  If you have questions, please do not hesitate to call me  I look forward to following your patient along with you  Sincerely,        Raad Godinez MD        CC: No Recipients  Raad Godinez MD  7/30/2022  1:37 PM  Sign when Signing Visit  Please refer to the [facility] ultrasound report in Ob Procedures for additional information regarding today's visit

## 2022-08-02 ENCOUNTER — TELEMEDICINE (OUTPATIENT)
Dept: PERINATAL CARE | Facility: CLINIC | Age: 32
End: 2022-08-02
Payer: COMMERCIAL

## 2022-08-02 DIAGNOSIS — O09.293 HISTORY OF GESTATIONAL DIABETES IN PRIOR PREGNANCY, CURRENTLY PREGNANT IN THIRD TRIMESTER: ICD-10-CM

## 2022-08-02 DIAGNOSIS — Z3A.35 35 WEEKS GESTATION OF PREGNANCY: ICD-10-CM

## 2022-08-02 DIAGNOSIS — Z86.32 HISTORY OF GESTATIONAL DIABETES IN PRIOR PREGNANCY, CURRENTLY PREGNANT IN THIRD TRIMESTER: ICD-10-CM

## 2022-08-02 DIAGNOSIS — O24.419 GESTATIONAL DIABETES MELLITUS (GDM) IN THIRD TRIMESTER, GESTATIONAL DIABETES METHOD OF CONTROL UNSPECIFIED: Primary | ICD-10-CM

## 2022-08-02 PROCEDURE — G0108 DIAB MANAGE TRN  PER INDIV: HCPCS

## 2022-08-02 NOTE — PROGRESS NOTES
Virtual Regular Visit    Verification of patient location:    Patient is located in the following state in which I hold an active license [place]      Assessment/Plan:    Problem List Items Addressed This Visit        Endocrine    Gestational diabetes mellitus (GDM) in third trimester - Primary       Other    33 weeks gestation of pregnancy    History of gestational diabetes in prior pregnancy, currently pregnant in second trimester               Reason for visit is   Chief Complaint   Patient presents with    Gestational Diabetes    Patient Education    Virtual Regular Visit        Encounter provider Joshua Cummings    Provider located at [address] [place] [id number] [phone]      Recent Visits  No visits were found meeting these conditions  Showing recent visits within past 7 days and meeting all other requirements  Today's Visits  Date Type Provider Dept   08/02/22 1401 [address]   Showing today's visits and meeting all other requirements  Future Appointments  No visits were found meeting these conditions  Showing future appointments within next 150 days and meeting all other requirements       The patient was identified by name and date of birth  Vikram Rodriguez was informed that this is a telemedicine visit and that the visit is being conducted through [address] and patient was informed this is a secure, HIPAA-complaint platform  She agrees to proceed     My office door was closed  No one else was in the room  She acknowledged consent and understanding of privacy and security of the video platform  The patient has agreed to participate and understands they can discontinue the visit at any time  Patient is aware this is a billable service  Subjective  Vikram Rodriguez is a 33 y  o pregnant female         HPI     Past Medical History:   Diagnosis Date    Anemia     anemia of pregnancy    Anxiety  Complication of anesthesia     extreme nausea    Depression     Diabetes mellitus ([facility] [address] )     A2GDM    Shingles     Past    Vacuum-assisted vaginal delivery 1/6/2020    Varicella     had as child       Past Surgical History:   Procedure Laterality Date    APPENDECTOMY      DILATION AND EVACUATION      TONSILLECTOMY      WISDOM TOOTH EXTRACTION         Current Outpatient Medications   Medication Sig Dispense Refill    Blood Glucose Monitoring Suppl (Contour Next Monitor) w/Device KIT       Contour Next Test test strip Test x4 a day or as instructed 100 strip 3    docusate sodium (COLACE) 100 mg capsule Take 100 mg by mouth 2 (two) times a day as needed        Microlet Lancets MISC Test x4 a day or as instructed 100 each 3    Prenatal Vit-Fe Fumarate-FA (PRENATAL VITAMINS PO) Take by mouth      senna-docusate sodium (SENOKOT-S) 8 6-50 mg per tablet Take 1 tablet by mouth every other day      sertraline (Zoloft) 50 mg tablet Take 1 tablet (50 mg total) by mouth daily With 25 mg 90 tablet 0    ondansetron (Zofran ODT) 4 mg disintegrating tablet Take 1 tablet (4 mg total) by mouth every 6 (six) hours as needed for nausea or vomiting (Patient not taking: No sig reported) 20 tablet 0     No current facility-administered medications for this visit  Allergies   Allergen Reactions    Hydrocodone-Acetaminophen Other (See Comments) and GI Intolerance     4 hours after wisdom tooth extraction, patient took pain pill and started spinning, vomiting, and sweating - unsure if related to pain medication or anesthesia        Review of Systems    Video Exam    There were no vitals filed for this visit  Physical Exam     I spent 45 minutes with patient today in which greater than 50% of the time was spent in counseling/coordination of care regarding gestational diabetes  Patient needed to attend her toddler and was uable to complete the entire visit         VIRTUAL VISIT DISCLAIMER      Gina Saldaña verbally agrees to participate in [employer]  Pt is aware that [employer] could be limited without vital signs or the ability to perform a full hands-on physical Ostim Kenney understands she or the provider may request at any time to terminate the video visit and request the patient to seek care or treatment in person  Thank you for referring your patient to [facility] Maternal [facility]etal Medicine Diabetes in Pregnancy Program      Yoan Shipley is a  33 y o  female who presents today for Combo Class 1 and 2  Patient has a history of insulin controlled GDM in her prior 2019 pregnancy  Patient is at 35w6d gestation, Estimated Date of Delivery: 22  Reviewed and updated the following from patients medical record: PMH, Problem List, Allergies, and Current Medications  Visit Diagnosis:  GDM in pregnancy method of control unspecified   Based on 22 US impressions per Dr Robina Johnson: Recommendations/Summary:  -I recommend formal Diabetes in Pregnancy referral for reporting of blood  sugars as she is informally following a GDM diet and having some  inconsistent postprandial elevations   -I advised a followup in 4 weeks for presumed GDM    Discussed case with Dr Pascual Mitchell today  Dr Pascual Mitchell also advised following patient as having GDM going forward  Discussed with patient pathophysiology of GDM, untreated hyperglycemia in pregnancy and maternal fetal complications including fetal macrosomia,  hypoglycemia, polyhydramnios, increased incidence of  section,  labor, and in severe cases fetal demise and still birth   Discussed importance of blood glucose monitoring, nutrition, and medication if necessary in achieving BG goals  Additional Pregnancy Complications:  history of insulin controlled GDM in prior pregnancy  Continued 2 hr pp elevations noted specifically at dinner meal  Patient reported testing 2 hr pp starting at the end of the meal not the beginning  Labs:  Lab Results   Component Value Date    [id number] 166 (H) 06/16/2022       Lab Results   Component Value Date    GLUF 85 06/25/2022      Patient reported she was unable to tolerate 3 hr GTT  Noted prior testing for 1 week with glucose meter however patient was following GDM meal plan  4/2/22 A1c 5 1%  Reorderd A1c today  Noted recent 6/22/22 CMP completed  Medications:  No diabetes related medications    Anthropometrics:  Ht Readings from Last 3 Encounters:   08/01/22 5' 5 5" (1 664 m)   07/13/22 5' 5 5" (1 664 m)   07/07/22 5' 5" (1 651 m)     Wt Readings from Last 3 Encounters:   08/01/22 68 7 kg (151 lb 6 4 oz)   07/28/22 68 kg (150 lb)   07/15/22 68 7 kg (151 lb 6 4 oz)     Pre-gravid weight: 57 2 kg (126 lb)  Pre-gravid BMI: 20 64  Weight Change: 11 5 kg (25 lb 6 4 oz)  Weight gain recommendations: BMI (18 5-24 9) 25-35 lbs  Comments: Patient reports stable weight status for several weeks due to   Recent Ultra Sound Results:  Date: 8/1/22  Fetal Growth: AC >97% and EFW 87%  LARISSA: Normal  Next US: 8/19/22    Blood Glucose Monitoring:   Glucose Meter: Contour Next EZ Refills ordered)  Instructed on testing blood sugars: 4 x per day (Fasting, 2 hour after start of each meal) Patient was testing 2 hr pp from the end of eating the meal  Patient will now test starting at the first bite of food and will set a phone alarm for reminders  Encouraged following with a snack to allow for space between meals  Gave instruction on site selection, skin preparation, loading strips and lancet device, meter activation, obtaining blood sample, test strip and lancet disposal and storage, and recording log book entries  Patient has good understanding of material covered and was able to test their own blood sugar in office today       Instruction for reporting blood sugar results weekly via:  Phone: [phone]   OR  My Chart (Message with image attachment, or Glucose Flowsheet)    Goal Blood Sugar Ranges: Fastin-90 mg/dL  1 hour after the start of each meal: 140 mg/dL or less  2 hours after start of each meal: 120 mg/dL or less      BG Log:    Date Fasting Post-  breakfast Post-  lunch Post-  dinner Before bedtime Carbs Comments   22 NR 81 101 145        74 93    142 (tested after a snack)    90 NR NR NR       80  104       75 97 111 142       84                       Patient reported she was testing FBG within at least an 8 hr to no longer than 10 hr fast overnight  She is testing 2 hr pp, starting to count from the end of eating the meal  Advised to start testing from the first bite of food  Patient to set a phone alarm for reminders and also to follow with a snack to encourage appropriate timing of meals and snacks  Advised patient to no longer test following snacks  Patient noted 140's reading were following a [employer] burger meal  Patient is now avoiding bocca burgers until after her pregnancy  Meal Plan (daily calorie and protein needs):  Calories: 2100 calorie Sent booklet and meal plan today via [employer]    Type of Diet:Regular  Additional Nutrition Concerns: patient has noticed some specific foods have caused pp elevations  Patient may not be eating enough CHO due to feeling full quickly  Snacks are too close to meals  Meal Plan Tips:  1  Patient was provided with a meal plan including 3 meals and 3 snacks  2  Discussed appropriate amounts of CHO, PRO, and Fat at each meal and snack  3  Reviewed CHO exchange list, and portion sizes for both CHO and PRO via food models  4  Instruction on how to read a food label  5  Provided suggested meal/snack options to increase nutrition and maintain consistent meal and snack intakes  6  Instructed on how to keep a 3-day food diary to be brought to follow- up appointment  7  Encouraged  patient to eat every 2 0-3 5 hours while awake  8   Encouraged patient to go no longer than 8-10 hours fasting overnight until first meal of the day  Physical Activity:  Discussed benefits of physical activity to optimize blood glucose control, encouraged activity at patient is physically able  Always consult a physician prior to starting an exercise program  Recommend 20-30 minutes daily  Is patient physically active? No Patient is active watching her toddler but is unable to walk due to discomfort in later stages of pregnancy  Sick day Guidelines:   Patient advised that sickness will raise blood sugar and need to continue medication regimen as directed  If blood sugar is > 160 mg/dL twice in one day call doctor  Instructed on what to do when unable to consume normal meal plan  Hypoglycemia & Treatment Guidelines:  Reviewed what hypoglycemia is, signs and symptoms, and how to treat  Post-Partum Guidelines:  Completion of 75 gm CHO 2 hr gtt at 6 weeks post-partum to check for Type 2 DM diagnosis    Breastfeeding Guidelines: (DID not review)  Continue GDM meal plan plus additional 350-500 calories daily  Stay hydrated by drinking 8-10 (8 oz ) fluids daily  Examples of protein and carbohydrate snacks provided  Dining Out & Travel Guidelines:  Patient advised to be prepared with extra diabetes supplies, medications, and snacks, as well as sticking to the same time schedule and portions eaten at home for meals and snacks  Medication options discussed today  Maternal-Fetal Testing:    Ultrasounds- growth scans every 4 weeks  NST- twice weekly starting at 32nd week GA   LARISSA- weekly starting at 32 weeks GA       Patient Stated Goal: "I will eat 3 meals and 3 snacks each day, including protein at each"    Diabetes Self Management Support Plan outside of ongoing care: Spouse/Family    Learner/s Present:Learners Present: Patient   Barriers to Learning/Change: No Barriers; appointment time was 45 minutes due to patient needing to care for her daughter     Expected Compliance: good    Date to report blood sugars: Requested patient update glucose flow sheet at least every Tuesday  F/U Date: no follow up scheduled today due to patient needing to shorten visit but patient is aware that is her blood sugars trend above target range consistently a follow up appointment will be scheduled  Begin Time: 10:45 am  End Time: 11:22 am    It was a pleasure working with them today  Please feel free to call with any questions or concerns      Kevon Caldwell, RD,LDN,CDE  Diabetes Educator  [facility]  Diabetes in Pregnancy Program  [address] [place] [address],[address]  [address]

## 2022-08-11 ENCOUNTER — TELEPHONE (OUTPATIENT)
Dept: OBGYN CLINIC | Facility: CLINIC | Age: 32
End: 2022-08-11

## 2022-08-11 ENCOUNTER — ROUTINE PRENATAL (OUTPATIENT)
Dept: OBGYN CLINIC | Facility: CLINIC | Age: 32
End: 2022-08-11
Payer: COMMERCIAL

## 2022-08-11 VITALS — SYSTOLIC BLOOD PRESSURE: 108 MMHG | DIASTOLIC BLOOD PRESSURE: 76 MMHG | BODY MASS INDEX: 25.07 KG/M2 | WEIGHT: 153 LBS

## 2022-08-11 DIAGNOSIS — O36.63X0 LARGE FOR GESTATIONAL AGE FETUS AFFECTING MOTHER, ANTEPARTUM, THIRD TRIMESTER, SINGLE GESTATION: ICD-10-CM

## 2022-08-11 DIAGNOSIS — O35.EXX0 PYELECTASIS OF FETUS ON PRENATAL ULTRASOUND: ICD-10-CM

## 2022-08-11 DIAGNOSIS — O24.410 DIET CONTROLLED WHITE CLASSIFICATION A1 GESTATIONAL DIABETES MELLITUS (GDM): ICD-10-CM

## 2022-08-11 DIAGNOSIS — Z3A.37 37 WEEKS GESTATION OF PREGNANCY: ICD-10-CM

## 2022-08-11 DIAGNOSIS — Z34.93 THIRD TRIMESTER PREGNANCY: Primary | ICD-10-CM

## 2022-08-11 PROCEDURE — 99214 OFFICE O/P EST MOD 30 MIN: CPT | Performed by: OBSTETRICS & GYNECOLOGY

## 2022-08-11 NOTE — TELEPHONE ENCOUNTER
Pt came in for appt today stated she was called by [facility] they have been trying to get a hold of someone at the office to get verbal consent for a breast pump  Pt said we need to call [phone] to give consent so she can get her breast pump  Please call as soon as possible

## 2022-08-11 NOTE — PROGRESS NOTES
Assessment  33 y o   at 37w1d presenting for routine prenatal visit  Plan  Diagnoses and all orders for this visit:    Third trimester pregnancy  37 weeks gestation of pregnancy  - Labor precautions  - FKC  - Return in 1wk for PN    Diet controlled White classification A1 gestational diabetes mellitus (GDM)  - Continue glucose checking and reporting  - Continue dietary changes  - Continue follow up with MFM    Large for gestational age fetus affecting mother, antepartum, third trimester, single gestation  - Large AC; EFW 3155g  - Counseling as below  Understands fetus currently below ACOG recommended guidelines for c/s, but also understands risks of shoulder dystocia  - Requests 1LTCS  Task sent for scheduling  - Continue to follow growth closely    Request for sterilization  - Insurance change and now requires MA31  - Understands cannot facilitate at this point, but alternatives reviewed  Vasectomy preferred by pt  Pyelectasis of fetus on prenatal ultrasound  - Mild and self limited  - Postpartum follow up      ____________________________________________________________        Migdalia Mitchell is a 33 y o   at 37w1d who presents for routine prenatal visit  She is reporting daily night time cramping/ctxns  Self-limited, but getting stronger  Associated with rectal pressure last time, but then went away on their own  Denies loss of fluid, or vaginal bleeding  She feels regular fetal movements  Discussed large AC  AC >97%, EFW 3155g (87%)  Patient feels like baby is bigger  She reports with last delivery, she pushed 3hr and resulted in VAVD  Reports difficult vacuum delivery that nearly resulted in c/s  Also with irritation/skin tag that protrudes  We discussed macrosomia, ACOG criteria for offering c/s, margin of error of 3rd trimester US, inc association of macrosomia (melvin asymmetric) with GDM, and current testing to date  Reviewed shoulder dystocia in detail   Advised on risks of this -- fetal injury (melvin brachial plexus/nerve injury), hypoxia, need for emergent , need for additional maneuvers to deliver, inc risk of episiotomy or OASIs  Discussed difficulty reliably predicting shoulder dystocia and unfortunately only way to avoid is via   Advised on  and risks of this procedure -- bleeding, infection, injury to surrounding structures or to fetus, and differences in recovery process  Patient had opportunity to ask questions and feels most comfortable scheduling   She has upcoming growth and acknowledges that if it was significantly different from last (or if she were to present in active labor), she would consider attempting labor  She is most concerned due to course of prior delivery that she may not be able to deliver a larger infant  Requests tubal sterilization with   As of 2wks ago, insurance changed to [employer] from [employer]  Discussed MA31 requirement/mandated 30d waiting period with [employer], which we will not meet at time of delivery  Discussed alternatives -- immediate post-placental LARCs, interval tubal, vasectomy  Patient reports she and her partner are considering vasectomy      Pregnancy Problems:  Patient Active Problem List   Diagnosis    PEPE (generalized anxiety disorder)    Otalgia of both ears    37 weeks gestation of pregnancy    Mild episode of recurrent major depressive disorder ([address] )    Drug dependence during pregnancy in second trimester ([address] )    History of gestational diabetes in prior pregnancy, currently pregnant in second trimester    Pyelectasis of fetus on prenatal ultrasound    Sterilization education    Elevated glucose tolerance test    Gestational diabetes mellitus (GDM) in third trimester    Large for gestational age fetus affecting mother, antepartum, third trimester, single gestation   [facility] Encounter for repeat ultrasound of fetal pyelectasis in mcdonald pregnancy, antepartum Objective  /76   Wt 69 4 kg (153 lb)   LMP 11/24/2021   BMI 25 07 kg/m²     FHT: 136 BPM   Uterine Size: 37 cm     Physical Exam:  Physical Exam  Constitutional:       General: She is not in acute distress  Appearance: Normal appearance  She is well-developed  She is not ill-appearing, toxic-appearing or diaphoretic  HENT:      Head: Normocephalic and atraumatic  Eyes:      General: No scleral icterus  Right eye: No discharge  Left eye: No discharge  Conjunctiva/sclera: Conjunctivae normal    Pulmonary:      Effort: Pulmonary effort is normal  No accessory muscle usage or respiratory distress  Abdominal:      General: There is distension (gravid)  Tenderness: There is no abdominal tenderness  There is no guarding or rebound  Skin:     General: Skin is warm and dry  Coloration: Skin is not jaundiced  Findings: No bruising, erythema or rash  Neurological:      Mental Status: She is alert  Psychiatric:         Mood and Affect: Mood normal          Behavior: Behavior normal          Thought Content:  Thought content normal          Judgment: Judgment normal

## 2022-08-11 NOTE — TELEPHONE ENCOUNTER
Spoke with loretta lim and provided verbal consent for br pump  They will reach out to Stevan Granados if they need any additional information   Lmom for patient with information

## 2022-08-15 ENCOUNTER — TELEPHONE (OUTPATIENT)
Dept: OBGYN CLINIC | Facility: MEDICAL CENTER | Age: 32
End: 2022-08-15

## 2022-08-15 NOTE — TELEPHONE ENCOUNTER
----- Message from Stefan Morales MD sent at 8/11/2022  1:03 PM EDT -----  Regarding: schedule cs  Lola Matthews would like to schedule 1LTCS for maternal request in setting of possible evolving macrosomia  Please schedule at/after 39wks

## 2022-08-16 ENCOUNTER — ROUTINE PRENATAL (OUTPATIENT)
Dept: OBGYN CLINIC | Facility: CLINIC | Age: 32
End: 2022-08-16
Payer: COMMERCIAL

## 2022-08-16 VITALS — BODY MASS INDEX: 25.4 KG/M2 | DIASTOLIC BLOOD PRESSURE: 60 MMHG | SYSTOLIC BLOOD PRESSURE: 108 MMHG | WEIGHT: 155 LBS

## 2022-08-16 DIAGNOSIS — Z30.09 STERILIZATION EDUCATION: ICD-10-CM

## 2022-08-16 DIAGNOSIS — Z3A.37 37 WEEKS GESTATION OF PREGNANCY: ICD-10-CM

## 2022-08-16 DIAGNOSIS — O24.410 DIET CONTROLLED GESTATIONAL DIABETES MELLITUS (GDM) IN THIRD TRIMESTER: Primary | ICD-10-CM

## 2022-08-16 DIAGNOSIS — F41.1 GAD (GENERALIZED ANXIETY DISORDER): ICD-10-CM

## 2022-08-16 DIAGNOSIS — R73.09 ELEVATED GLUCOSE TOLERANCE TEST: ICD-10-CM

## 2022-08-16 PROCEDURE — 99214 OFFICE O/P EST MOD 30 MIN: CPT | Performed by: STUDENT IN AN ORGANIZED HEALTH CARE EDUCATION/TRAINING PROGRAM

## 2022-08-16 NOTE — ASSESSMENT & PLAN NOTE
- We reviewed the discussion regarding mode of delivery given the concern for evolving fetal macrosomia and A1GDM  She has an upcoming ultrasound this Friday  She had previously decided on elective primary  delivery  - We continued the discussion today  She is well counseled on the risks of shoulder dystocia and knows that in general a  is recommended once EFW exceeds 4500 grams in a mother with diabetes  - Encouraged her that if she does not exceed this threshold I would be comfortable offering her vaginal delivery given that she is well counseled, but would also support her decision for primary  delivery if that was desired  She will defer the discussion to after the ultrasound  She is aware of the limitations of ultrasound

## 2022-08-16 NOTE — PROGRESS NOTES
Routine Prenatal Visit  [facility] [address] [place] [address]    Assessment/Plan:  Eli Grissom is a 33y o  year old  at 41w10d who presents for routine prenatal visit  1  Diet controlled gestational diabetes mellitus (GDM) in third trimester    2  37 weeks gestation of pregnancy  Assessment & Plan:  - We reviewed the discussion regarding mode of delivery given the concern for evolving fetal macrosomia and A1GDM  She has an upcoming ultrasound this Friday  She had previously decided on elective primary  delivery  - We continued the discussion today  She is well counseled on the risks of shoulder dystocia and knows that in general a  is recommended once EFW exceeds 4500 grams in a mother with diabetes  - Encouraged her that if she does not exceed this threshold I would be comfortable offering her vaginal delivery given that she is well counseled, but would also support her decision for primary  delivery if that was desired  She will defer the discussion to after the ultrasound  She is aware of the limitations of ultrasound  3  PEPE (generalized anxiety disorder)    4  Sterilization education    5  Elevated glucose tolerance test        Subjective:     CC: Prenatal care    Dariel Balderas is a 33 y o   female who presents for routine prenatal care at 37w6d  Pregnancy ROS: Denies leakage of fluid, pelvic pain, or vaginal bleeding  Reports normal fetal movement      The following portions of the patient's history were reviewed and updated as appropriate: allergies, current medications, past family history, past medical history, obstetric history, gynecologic history, past social history, past surgical history and problem list       Objective:  /60   Wt 70 3 kg (155 lb)   LMP 2021   BMI 25 40 kg/m²   Pregravid Weight/BMI: 57 2 kg (126 lb) (BMI 20 64)  Current Weight: 70 3 kg (155 lb)   Total Weight Gain: 13 2 kg (29 lb)   Pre- Vitals    Flowsheet Row Most Recent Value   Prenatal Assessment    Fetal Heart Rate 130   Fundal Height (cm) 39 cm   Movement Present   Prenatal Vitals    Blood Pressure 108/60   Weight - Scale 70 3 kg (155 lb)   Urine Albumin/Glucose    Dilation/Effacement/Station    Vaginal Drainage    Edema    LLE Edema Trace   RLE Edema Trace   Facial Edema None           General: Well appearing, no distress  Respiratory: Unlabored breathing  Cardiovascular: Regular rate  Abdomen: Soft, gravid, nontender  Fundal Height: Slightly advanced for gestational age  Extremities: Warm and well perfused  Non tender      Paras Mahoney MD  [address] [place] [address]  8/16/2022 1:16 PM

## 2022-08-18 ENCOUNTER — ROUTINE PRENATAL (OUTPATIENT)
Dept: OBGYN CLINIC | Facility: CLINIC | Age: 32
End: 2022-08-18
Payer: COMMERCIAL

## 2022-08-18 VITALS — DIASTOLIC BLOOD PRESSURE: 70 MMHG | SYSTOLIC BLOOD PRESSURE: 110 MMHG | WEIGHT: 154 LBS | BODY MASS INDEX: 25.24 KG/M2

## 2022-08-18 DIAGNOSIS — Z34.93 THIRD TRIMESTER PREGNANCY: Primary | ICD-10-CM

## 2022-08-18 DIAGNOSIS — O35.EXX0 PYELECTASIS OF FETUS ON PRENATAL ULTRASOUND: ICD-10-CM

## 2022-08-18 DIAGNOSIS — Z3A.38 38 WEEKS GESTATION OF PREGNANCY: ICD-10-CM

## 2022-08-18 DIAGNOSIS — O36.63X0 LARGE FOR GESTATIONAL AGE FETUS AFFECTING MOTHER, ANTEPARTUM, THIRD TRIMESTER, SINGLE GESTATION: ICD-10-CM

## 2022-08-18 DIAGNOSIS — O24.410 DIET CONTROLLED GESTATIONAL DIABETES MELLITUS (GDM) IN THIRD TRIMESTER: ICD-10-CM

## 2022-08-18 DIAGNOSIS — O09.292 HISTORY OF GESTATIONAL DIABETES IN PRIOR PREGNANCY, CURRENTLY PREGNANT IN SECOND TRIMESTER: ICD-10-CM

## 2022-08-18 DIAGNOSIS — Z86.32 HISTORY OF GESTATIONAL DIABETES IN PRIOR PREGNANCY, CURRENTLY PREGNANT IN SECOND TRIMESTER: ICD-10-CM

## 2022-08-18 PROCEDURE — 99214 OFFICE O/P EST MOD 30 MIN: CPT | Performed by: OBSTETRICS & GYNECOLOGY

## 2022-08-18 NOTE — PROGRESS NOTES
Assessment  33 y o   at 38w1d presenting for routine prenatal visit  Plan  Diagnoses and all orders for this visit:    Third trimester pregnancy  38 weeks gestation of pregnancy  - Labor precautions  - FKC  - Return in 1wk for PN     Diet controlled White classification A1 gestational diabetes mellitus (GDM)  - Continue glucose checking and reporting  - Continue dietary changes  - Continue follow up with MFM     Large for gestational age fetus affecting mother, antepartum, third trimester, single gestation  - Large AC; EFW 3155g  - Growth US upcoming  - Scheduled for 1LTCS, undecided on definitive delivery route  Ongoing counseling   - Encouraged to keep scheduled 1LTCS appt for now  Can adjust plan even up to that day if required  - 1LTCS preop instructions reviewed     Pyelectasis of fetus on prenatal ultrasound  - Mild and self limited  - Postpartum follow up    ____________________________________________________________        Lindsey Cee is a 33 y o   at 38w1d who presents for routine prenatal visit  She is without complaint  She denies regular contractions, loss of fluid, or vaginal bleeding  She feels regular fetal movements  Requests to discuss  vs 1LTCS  She reviewed her take-aways from the prior to counseling sessions with myself and Dr Natty Mckenna, which she feels are all valid but not clear guidance  She expresses frustration at limited ability to predict outcomes and indecision on final plan  I affirmed her frustrations and discussed risk/benefit, but that ultimate outcome is impossible to determine in advance  Discussed ACOG criteria for c/s (which she is below), AC/HC disproportion, and pregnancy/labor hx  Inquires about the differences between VAVD and FAVD, which were discussed in detail  Advised ultimately the choice remains hers and we may be able to comment further after next growth US   By strict criteria, this would be elective/maternal requested , which is not unreasonable and we are happy to facilitate but do not have strong guidelines to strictly recommend  Objective  /70   Wt 69 9 kg (154 lb)   LMP 2021   BMI 25 24 kg/m²     FHT: 136 BPM   Uterine Size: 39 cm     Physical Exam:  Physical Exam  Constitutional:       General: She is not in acute distress  Appearance: Normal appearance  She is well-developed  She is not ill-appearing, toxic-appearing or diaphoretic  HENT:      Head: Normocephalic and atraumatic  Eyes:      General: No scleral icterus  Right eye: No discharge  Left eye: No discharge  Conjunctiva/sclera: Conjunctivae normal    Pulmonary:      Effort: Pulmonary effort is normal  No accessory muscle usage or respiratory distress  Abdominal:      General: There is distension (gravid)  Tenderness: There is no abdominal tenderness  There is no guarding or rebound  Skin:     General: Skin is warm and dry  Coloration: Skin is not jaundiced  Findings: No bruising, erythema or rash  Neurological:      Mental Status: She is alert  Psychiatric:         Mood and Affect: Mood normal          Behavior: Behavior normal          Thought Content:  Thought content normal          Judgment: Judgment normal

## 2022-08-19 ENCOUNTER — ULTRASOUND (OUTPATIENT)
Dept: PERINATAL CARE | Facility: OTHER | Age: 32
End: 2022-08-19
Payer: COMMERCIAL

## 2022-08-19 VITALS
DIASTOLIC BLOOD PRESSURE: 61 MMHG | WEIGHT: 154 LBS | HEART RATE: 85 BPM | BODY MASS INDEX: 24.75 KG/M2 | SYSTOLIC BLOOD PRESSURE: 113 MMHG | HEIGHT: 66 IN

## 2022-08-19 DIAGNOSIS — Z3A.38 38 WEEKS GESTATION OF PREGNANCY: ICD-10-CM

## 2022-08-19 DIAGNOSIS — O24.410 DIET CONTROLLED GESTATIONAL DIABETES MELLITUS (GDM) IN THIRD TRIMESTER: ICD-10-CM

## 2022-08-19 DIAGNOSIS — O35.EXX0 PYELECTASIS OF FETUS ON PRENATAL ULTRASOUND: Primary | ICD-10-CM

## 2022-08-19 PROBLEM — O36.63X0 LARGE FOR GESTATIONAL AGE FETUS AFFECTING MOTHER, ANTEPARTUM, THIRD TRIMESTER, SINGLE GESTATION: Status: RESOLVED | Noted: 2022-08-01 | Resolved: 2022-08-19

## 2022-08-19 PROCEDURE — 76816 OB US FOLLOW-UP PER FETUS: CPT | Performed by: OBSTETRICS & GYNECOLOGY

## 2022-08-19 PROCEDURE — 99214 OFFICE O/P EST MOD 30 MIN: CPT | Performed by: OBSTETRICS & GYNECOLOGY

## 2022-08-19 NOTE — PROGRESS NOTES
The patient was seen today for an ultrasound  Please see ultrasound report (located under Ob Procedures) for additional details  Thank you very much for allowing us to participate in the care of this very nice patient  Should you have any questions, please do not hesitate to contact me  Noel Jackson MD [phone]  Attending Physician, Ramesh

## 2022-08-24 ENCOUNTER — HOSPITAL ENCOUNTER (INPATIENT)
Facility: HOSPITAL | Age: 32
LOS: 3 days | Discharge: HOME | DRG: 560 | End: 2022-08-27
Attending: OBSTETRICS & GYNECOLOGY | Admitting: OBSTETRICS & GYNECOLOGY
Payer: COMMERCIAL

## 2022-08-24 ENCOUNTER — HOSPITAL ENCOUNTER (OUTPATIENT)
Dept: LABOR AND DELIVERY | Facility: HOSPITAL | Age: 32
Discharge: HOME | DRG: 560 | End: 2022-08-24
Payer: COMMERCIAL

## 2022-08-24 DIAGNOSIS — Z3A.39 39 WEEKS GESTATION OF PREGNANCY: ICD-10-CM

## 2022-08-24 DIAGNOSIS — F41.1 GAD (GENERALIZED ANXIETY DISORDER): ICD-10-CM

## 2022-08-24 PROBLEM — Z34.90 ENCOUNTER FOR INDUCTION OF LABOR: Status: ACTIVE | Noted: 2022-08-24

## 2022-08-24 LAB
ABO GROUP BLD: NORMAL
BLD GP AB SCN SERPL QL: NEGATIVE
ERYTHROCYTE [DISTWIDTH] IN BLOOD BY AUTOMATED COUNT: 13.4 % (ref 11.6–15.1)
GLUCOSE SERPL-MCNC: 71 MG/DL (ref 65–140)
GLUCOSE SERPL-MCNC: 72 MG/DL (ref 65–140)
HCT VFR BLD AUTO: 38.1 % (ref 34.8–46.1)
HGB BLD-MCNC: 12.9 G/DL (ref 11.5–15.4)
MCH RBC QN AUTO: 32.6 PG (ref 26.8–34.3)
MCHC RBC AUTO-ENTMCNC: 33.9 G/DL (ref 31.4–37.4)
MCV RBC AUTO: 96 FL (ref 82–98)
PLATELET # BLD AUTO: 201 THOUSANDS/UL (ref 149–390)
PMV BLD AUTO: 11.3 FL (ref 8.9–12.7)
RBC # BLD AUTO: 3.96 MILLION/UL (ref 3.81–5.12)
RH BLD: POSITIVE
SPECIMEN EXPIRATION DATE: NORMAL
WBC # BLD AUTO: 10.67 THOUSAND/UL (ref 4.31–10.16)

## 2022-08-24 PROCEDURE — 3E0P7VZ INTRODUCTION OF HORMONE INTO FEMALE REPRODUCTIVE, VIA NATURAL OR ARTIFICIAL OPENING: ICD-10-PCS | Performed by: STUDENT IN AN ORGANIZED HEALTH CARE EDUCATION/TRAINING PROGRAM

## 2022-08-24 PROCEDURE — 82948 REAGENT STRIP/BLOOD GLUCOSE: CPT

## 2022-08-24 PROCEDURE — 86850 RBC ANTIBODY SCREEN: CPT

## 2022-08-24 PROCEDURE — 10907ZC DRAINAGE OF AMNIOTIC FLUID, THERAPEUTIC FROM PRODUCTS OF CONCEPTION, VIA NATURAL OR ARTIFICIAL OPENING: ICD-10-PCS | Performed by: STUDENT IN AN ORGANIZED HEALTH CARE EDUCATION/TRAINING PROGRAM

## 2022-08-24 PROCEDURE — 86901 BLOOD TYPING SEROLOGIC RH(D): CPT

## 2022-08-24 PROCEDURE — 86592 SYPHILIS TEST NON-TREP QUAL: CPT

## 2022-08-24 PROCEDURE — NC001 PR NO CHARGE: Performed by: OBSTETRICS & GYNECOLOGY

## 2022-08-24 PROCEDURE — 85027 COMPLETE CBC AUTOMATED: CPT

## 2022-08-24 PROCEDURE — 86900 BLOOD TYPING SEROLOGIC ABO: CPT

## 2022-08-24 PROCEDURE — 4A1HXCZ MONITORING OF PRODUCTS OF CONCEPTION, CARDIAC RATE, EXTERNAL APPROACH: ICD-10-PCS | Performed by: STUDENT IN AN ORGANIZED HEALTH CARE EDUCATION/TRAINING PROGRAM

## 2022-08-24 PROCEDURE — 3E033VJ INTRODUCTION OF OTHER HORMONE INTO PERIPHERAL VEIN, PERCUTANEOUS APPROACH: ICD-10-PCS | Performed by: STUDENT IN AN ORGANIZED HEALTH CARE EDUCATION/TRAINING PROGRAM

## 2022-08-24 RX ORDER — ONDANSETRON 2 MG/ML
4 INJECTION INTRAMUSCULAR; INTRAVENOUS EVERY 6 HOURS PRN
Status: DISCONTINUED | OUTPATIENT
Start: 2022-08-24 | End: 2022-08-25 | Stop reason: SDUPTHER

## 2022-08-24 RX ORDER — SODIUM CHLORIDE, SODIUM LACTATE, POTASSIUM CHLORIDE, CALCIUM CHLORIDE 600; 310; 30; 20 MG/100ML; MG/100ML; MG/100ML; MG/100ML
125 INJECTION, SOLUTION INTRAVENOUS CONTINUOUS
Status: DISCONTINUED | OUTPATIENT
Start: 2022-08-24 | End: 2022-08-27 | Stop reason: HOSPADM

## 2022-08-24 RX ADMIN — Medication 25 MCG: at 21:42

## 2022-08-25 ENCOUNTER — ANESTHESIA (INPATIENT)
Dept: ANESTHESIOLOGY | Facility: HOSPITAL | Age: 32
End: 2022-08-25
Payer: COMMERCIAL

## 2022-08-25 ENCOUNTER — ANESTHESIA EVENT (INPATIENT)
Dept: ANESTHESIOLOGY | Facility: HOSPITAL | Age: 32
End: 2022-08-25
Payer: COMMERCIAL

## 2022-08-25 LAB
BASE EXCESS BLDCOA CALC-SCNC: -5.2 MMOL/L (ref 3–11)
BASE EXCESS BLDCOV CALC-SCNC: -4.2 MMOL/L (ref 1–9)
GLUCOSE SERPL-MCNC: 71 MG/DL (ref 65–140)
GLUCOSE SERPL-MCNC: 79 MG/DL (ref 65–140)
GLUCOSE SERPL-MCNC: 87 MG/DL (ref 65–140)
HCO3 BLDCOA-SCNC: 23.9 MMOL/L (ref 17.3–27.3)
HCO3 BLDCOV-SCNC: 21 MMOL/L (ref 12.2–28.6)
O2 CT VFR BLDCOA CALC: 10.1 ML/DL
OXYHGB MFR BLDCOA: 43.1 %
OXYHGB MFR BLDCOV: 75.4 %
PCO2 BLDCOA: 61.1 MM[HG] (ref 30–60)
PCO2 BLDCOV: 39.4 MM HG (ref 27–43)
PH BLDCOA: 7.21 [PH] (ref 7.23–7.43)
PH BLDCOV: 7.34 [PH] (ref 7.19–7.49)
PO2 BLDCOA: 24.4 MM HG (ref 5–25)
PO2 BLDCOV: 36 MM HG (ref 15–45)
RPR SER QL: NORMAL
SAO2 % BLDCOV: 17.1 ML/DL

## 2022-08-25 PROCEDURE — 0HQ9XZZ REPAIR PERINEUM SKIN, EXTERNAL APPROACH: ICD-10-PCS | Performed by: STUDENT IN AN ORGANIZED HEALTH CARE EDUCATION/TRAINING PROGRAM

## 2022-08-25 PROCEDURE — NC001 PR NO CHARGE: Performed by: OBSTETRICS & GYNECOLOGY

## 2022-08-25 PROCEDURE — 82948 REAGENT STRIP/BLOOD GLUCOSE: CPT

## 2022-08-25 PROCEDURE — 59409 OBSTETRICAL CARE: CPT | Performed by: STUDENT IN AN ORGANIZED HEALTH CARE EDUCATION/TRAINING PROGRAM

## 2022-08-25 PROCEDURE — 82805 BLOOD GASES W/O2 SATURATION: CPT | Performed by: OBSTETRICS & GYNECOLOGY

## 2022-08-25 RX ORDER — ROPIVACAINE HYDROCHLORIDE 5 MG/ML
INJECTION, SOLUTION EPIDURAL; INFILTRATION; PERINEURAL AS NEEDED
Status: DISCONTINUED | OUTPATIENT
Start: 2022-08-25 | End: 2022-08-25 | Stop reason: HOSPADM

## 2022-08-25 RX ORDER — IBUPROFEN 600 MG/1
600 TABLET ORAL EVERY 6 HOURS
Status: DISCONTINUED | OUTPATIENT
Start: 2022-08-25 | End: 2022-08-27 | Stop reason: HOSPADM

## 2022-08-25 RX ORDER — ACETAMINOPHEN 325 MG/1
650 TABLET ORAL EVERY 4 HOURS PRN
Status: DISCONTINUED | OUTPATIENT
Start: 2022-08-25 | End: 2022-08-27 | Stop reason: HOSPADM

## 2022-08-25 RX ORDER — OXYTOCIN/RINGER'S LACTATE 30/500 ML
250 PLASTIC BAG, INJECTION (ML) INTRAVENOUS ONCE
Status: DISCONTINUED | OUTPATIENT
Start: 2022-08-25 | End: 2022-08-27 | Stop reason: HOSPADM

## 2022-08-25 RX ORDER — LIDOCAINE HYDROCHLORIDE AND EPINEPHRINE 20; 5 MG/ML; UG/ML
INJECTION, SOLUTION EPIDURAL; INFILTRATION; INTRACAUDAL; PERINEURAL AS NEEDED
Status: DISCONTINUED | OUTPATIENT
Start: 2022-08-25 | End: 2022-08-25 | Stop reason: HOSPADM

## 2022-08-25 RX ORDER — OXYTOCIN/RINGER'S LACTATE 30/500 ML
1-30 PLASTIC BAG, INJECTION (ML) INTRAVENOUS
Status: DISCONTINUED | OUTPATIENT
Start: 2022-08-25 | End: 2022-08-27 | Stop reason: HOSPADM

## 2022-08-25 RX ORDER — ROPIVACAINE HYDROCHLORIDE 2 MG/ML
INJECTION, SOLUTION EPIDURAL; INFILTRATION; PERINEURAL CONTINUOUS PRN
Status: DISCONTINUED | OUTPATIENT
Start: 2022-08-25 | End: 2022-08-25 | Stop reason: HOSPADM

## 2022-08-25 RX ORDER — ONDANSETRON 2 MG/ML
4 INJECTION INTRAMUSCULAR; INTRAVENOUS EVERY 8 HOURS PRN
Status: DISCONTINUED | OUTPATIENT
Start: 2022-08-25 | End: 2022-08-27 | Stop reason: HOSPADM

## 2022-08-25 RX ORDER — CALCIUM CARBONATE 200(500)MG
1000 TABLET,CHEWABLE ORAL 3 TIMES DAILY PRN
Status: DISCONTINUED | OUTPATIENT
Start: 2022-08-25 | End: 2022-08-27 | Stop reason: HOSPADM

## 2022-08-25 RX ORDER — DIAPER,BRIEF,INFANT-TODD,DISP
1 EACH MISCELLANEOUS DAILY PRN
Status: DISCONTINUED | OUTPATIENT
Start: 2022-08-25 | End: 2022-08-27 | Stop reason: HOSPADM

## 2022-08-25 RX ORDER — DOCUSATE SODIUM 100 MG/1
100 CAPSULE, LIQUID FILLED ORAL 2 TIMES DAILY
Status: DISCONTINUED | OUTPATIENT
Start: 2022-08-25 | End: 2022-08-27 | Stop reason: HOSPADM

## 2022-08-25 RX ADMIN — SERTRALINE HYDROCHLORIDE 25 MG: 50 TABLET, FILM COATED ORAL at 17:39

## 2022-08-25 RX ADMIN — SODIUM CHLORIDE, SODIUM LACTATE, POTASSIUM CHLORIDE, AND CALCIUM CHLORIDE 125 ML/HR: .6; .31; .03; .02 INJECTION, SOLUTION INTRAVENOUS at 05:26

## 2022-08-25 RX ADMIN — Medication 2 MILLI-UNITS/MIN: at 05:26

## 2022-08-25 RX ADMIN — ROPIVACAINE HYDROCHLORIDE 5 ML/HR: 2 INJECTION, SOLUTION EPIDURAL; INFILTRATION at 09:18

## 2022-08-25 RX ADMIN — BENZOCAINE AND LEVOMENTHOL 1 APPLICATION: 200; 5 SPRAY TOPICAL at 13:36

## 2022-08-25 RX ADMIN — ROPIVACAINE HYDROCHLORIDE 5 ML: 5 INJECTION EPIDURAL; INFILTRATION; PERINEURAL at 09:12

## 2022-08-25 RX ADMIN — ACETAMINOPHEN 650 MG: 325 TABLET, FILM COATED ORAL at 15:39

## 2022-08-25 RX ADMIN — SERTRALINE HYDROCHLORIDE 50 MG: 50 TABLET, FILM COATED ORAL at 13:37

## 2022-08-25 RX ADMIN — ROPIVACAINE HYDROCHLORIDE 2 ML: 5 INJECTION EPIDURAL; INFILTRATION; PERINEURAL at 09:18

## 2022-08-25 RX ADMIN — IBUPROFEN 600 MG: 600 TABLET ORAL at 20:02

## 2022-08-25 RX ADMIN — LIDOCAINE HYDROCHLORIDE,EPINEPHRINE BITARTRATE 5 ML: 20; .005 INJECTION, SOLUTION EPIDURAL; INFILTRATION; INTRACAUDAL; PERINEURAL at 09:10

## 2022-08-25 RX ADMIN — ONDANSETRON 4 MG: 2 INJECTION INTRAMUSCULAR; INTRAVENOUS at 10:22

## 2022-08-25 RX ADMIN — DOCUSATE SODIUM 100 MG: 100 CAPSULE, LIQUID FILLED ORAL at 17:39

## 2022-08-25 RX ADMIN — IBUPROFEN 600 MG: 600 TABLET ORAL at 13:37

## 2022-08-25 RX ADMIN — WITCH HAZEL 1 PAD: 500 SOLUTION RECTAL; TOPICAL at 13:36

## 2022-08-25 NOTE — H&P
H&P Exam - Obstetrics   Alli Ferrer 27 y o  female MRN: [id number]  Unit/Bed#: L&D 326-01 Encounter: [id number]      ASSESSMENT:  28 yo  at 39w0d weeks gestation who is being admitted for elective induction of labor  EFW:  3585 grams - 7 lbs 14 oz     (74%), AC 98%  on 2022  We discussed the risks of shoulder dystocia, including fetal asphyxia and injury, including brachial plexus injury, clavicular fracture, humerus fracture, as well as death  We also discussed risks to mom, including hemorrhage and third and fourth degree lacerations  VTX by transabdominal ultrasound     PLAN:  * Encounter for induction of labor  Assessment & Plan  Admit  Follow up CBC, RPR, Blood Type  Start with cytotec  Method of contraception: condoms to vasectomy  GBS  status: negative  Analgesia and/or epidural at patient request  Anticipate     Gestational diabetes mellitus (GDM) in third trimester  Assessment & Plan  Lab Results   Component Value Date    HGBA1C 5 1 2022       No results for input(s): POCGLU in the last 72 hours  Blood Sugar Average: Last 72 hrs:     - A1GDM protocol for POCT glucose  - Insulin gtt as needed    PEPE (generalized anxiety disorder)  Assessment & Plan  Home Zoloft ordered    Discussed with Dr Camelia Guerrero    This patient will be an INPATIENT  and I certify the anticipated length of stay is >2 Midnights  History of Present Illness     Chief Complaint: Induction of labor    HPI:  Alli Ferrer is a 27 y o   female with an DAVID of 2022, by Last Menstrual Period at 39w0d weeks gestation who is being admitted for elective induction of labor  She has had some occasional contractions for the past week  She denies LOS, vaginal bleeding, and reports normal fetal movement  She is an [facility] patient       PREGNANCY COMPLICATIONS:   1) K4MRP  2) PEPE  3) MDD    OB History    Para Term  AB Living   3 1 1 0 1 1   SAB IAB Ectopic Multiple Live Births         0 1      # Outcome Date GA Lbr Terrence/2nd Weight Sex Delivery Anes PTL Lv   3 Current            2 Term 20 39w3d 06: / 02:46 3487 g (7 lb 11 oz) F Vag-Vacuum EPI  DONAVAN   1 AB  9w0d             Birth Comments: surgically induced        Baby complications/comments: none    Review of Systems   Constitutional: Negative for chills and fever  Eyes: Negative for visual disturbance  Respiratory: Negative for chest tightness and shortness of breath  Cardiovascular: Negative for chest pain and palpitations  Gastrointestinal: Negative for abdominal pain  Genitourinary: Negative for vaginal bleeding, vaginal discharge and vaginal pain  Musculoskeletal: Negative for back pain  Neurological: Negative for headaches           Historical Information   Past Medical History:   Diagnosis Date    Anemia     anemia of pregnancy    Anxiety     Complication of anesthesia     extreme nausea    Depression     Diabetes mellitus ([facility] [address] )     A2GDM    Shingles     Past    Vacuum-assisted vaginal delivery 2020    Varicella     had as child     Past Surgical History:   Procedure Laterality Date    APPENDECTOMY      DILATION AND EVACUATION      TONSILLECTOMY      WISDOM TOOTH EXTRACTION       Social History   Social History     Substance and Sexual Activity   Alcohol Use Not Currently    Alcohol/week: 0 0 standard drinks    Comment: socially prior to knowledge of pregnancy     Social History     Substance and Sexual Activity   Drug Use Never     Social History     Tobacco Use   Smoking Status Former Smoker    Packs/day: 0 25    Years: 8 00    Pack years: 2 00    Types: Cigarettes    Quit date: 2017    Years since quittin 6   Smokeless Tobacco Never Used     Family History: non-contributory    Meds/Allergies      Medications Prior to Admission   Medication    Blood Glucose Monitoring Suppl (Contour Next Monitor) w/Device KIT    Contour Next Test test strip    docusate sodium (COLACE) 100 mg capsule    Microlet Lancets MISC    Prenatal Vit-Fe Fumarate-FA (PRENATAL VITAMINS PO)    senna-docusate sodium (SENOKOT-S) 8 6-50 mg per tablet    sertraline (Zoloft) 50 mg tablet    ondansetron (Zofran ODT) 4 mg disintegrating tablet        Allergies   Allergen Reactions    Hydrocodone-Acetaminophen Other (See Comments) and GI Intolerance     4 hours after wisdom tooth extraction, patient took pain pill and started spinning, vomiting, and sweating - unsure if related to pain medication or anesthesia        OBJECTIVE:    Vitals: Blood pressure 113/72, pulse 66, temperature 98 6 °F (37 °C), temperature source Oral, resp  rate 18, height 5' 5" (1 651 m), weight 70 3 kg (155 lb), last menstrual period 11/24/2021, currently breastfeeding  Body mass index is 25 79 kg/m²  Physical Exam  HENT:      Head: Normocephalic  Eyes:      Conjunctiva/sclera: Conjunctivae normal    Cardiovascular:      Rate and Rhythm: Normal rate  Pulses: Normal pulses  Pulmonary:      Effort: Pulmonary effort is normal    Abdominal:      Palpations: Abdomen is soft  Tenderness: There is no abdominal tenderness  Skin:     General: Skin is warm  Neurological:      Mental Status: She is alert and oriented to person, place, and time     Psychiatric:         Mood and Affect: Mood normal          Cervix:  2/50/-2    Fetal heart rate:   125 bpm with moderate variability, accelerations, no decelerations    [place]:   No contractions    Prenatal Labs:   Blood Type:   Lab Results   Component Value Date/Time    ABO Grouping A 02/12/2022 08:31 AM     , D (Rh type):   Lab Results   Component Value Date/Time    Rh Factor Positive 02/12/2022 08:31 AM     , Antibody Screen: negative   , HCT/HGB:   Lab Results   Component Value Date/Time    Hematocrit 40 6 06/20/2022 09:01 AM    Hemoglobin 13 3 06/20/2022 09:01 AM      , MCV:   Lab Results   Component Value Date/Time     (H) 06/20/2022 09:01 AM      , Platelets:   Lab Results   Component Value Date/Time    Platelets 024 66/80/0191 09:01 AM      , 1 hour Glucola:   Lab Results   Component Value Date/Time    Glucose 166 (H) 06/16/2022 05:41 PM   , Varicella:   Lab Results   Component Value Date/Time    Varicella IgG IMMUNE 10/24/2019 02:21 PM       , Rubella:   Lab Results   Component Value Date/Time    Rubella IgG Quant 87 3 02/12/2022 08:31 AM        , VDRL/RPR:   Lab Results   Component Value Date/Time    RPR Non-Reactive 02/12/2022 08:31 AM      , Hep B:   Lab Results   Component Value Date/Time    Hepatitis B Surface Ag Non-reactive 02/12/2022 08:31 AM    , HIV:   Lab Results   Component Value Date/Time    HIV-1/HIV-2 Ab Non-Reactive 02/12/2022 08:31 AM     , Chlamydia: negative    , Gonorrhea:   Lab Results   Component Value Date/Time    N gonorrhoeae, DNA Probe Negative 02/17/2022 06:58 PM     , Group B Strep:    Lab Results   Component Value Date/Time    Strep Grp B PCR Negative 07/28/2022 03:49 PM    Strep Grp B PCR Negative for Beta Hemolytic Strep Grp B by PCR 12/12/2019 10:48 AM          Invasive Devices  Timeline    Peripheral Intravenous Line  Duration           Peripheral IV 06/21/22 Distal;Dorsal (posterior); Left Forearm 64 days              Edith Dance, MD  PGY-2  8/24/2022  9:24 PM

## 2022-08-25 NOTE — OB LABOR/OXYTOCIN SAFETY PROGRESS
Labor Progress Note - Malou Obrien 27 y o  female MRN: [id number]    Unit/Bed#: L&D 326-01 Encounter: [id number]       Contraction Frequency (minutes): 3-4  Contraction Quality: Mild  Tachysystole: No   Cervical Dilation: 4        Cervical Effacement: 50  Fetal Station: -2  Baseline Rate: 130 bpm                   Vital Signs:   Vitals:    08/24/22 2253   BP: 105/61   Pulse: 73   Resp:    Temp:        Notes/comments:   Patient is starting to feel contractions and back pain  Cervical exam as above  Fetal heart tracing category 1 with moderate variability, accelerations, no decelerations, issa every 3-4 minutes  Will start Pitocin when contractions space out      Dr Laura Evans aware    Hugo Burrell MD 8/25/2022 1:42 AM

## 2022-08-25 NOTE — ANESTHESIA POSTPROCEDURE EVALUATION
Post-Op Assessment Note    CV Status:  Stable    Pain management: adequate     Mental Status:  Alert and awake   Hydration Status:  Euvolemic   PONV Controlled:  Controlled   Airway Patency:  Patent      Post Op Vitals Reviewed: Yes      Staff: Anesthesiologist         No complications documented      BP      Temp      Pulse    Resp      SpO2      /59   Pulse (!) 108   Temp 98 6 °F (37 °C) (Oral)   Resp 18   Ht 5' 5" (1 651 m)   Wt 70 3 kg (155 lb)   LMP 11/24/2021   SpO2 100%   BMI 25 79 kg/m²

## 2022-08-25 NOTE — QUICK NOTE
Patient's chart noted to have diagnosis of "Drug dependence during pregnancy in second trimester" on 3/21/2022  Note from this date written by Terence Mayorga, [address] Roque reviewed and found that patient does not use recreational drugs, but rather that she has taken Zoloft throughout her pregnancy for anxiety and depression  Confirmed with patient that she has never used recreational drugs throughout pregnancy on admission  UDS not indicated at this time      Isrrael Huitron MD  PGY-2  8/25/2022  5:09 AM

## 2022-08-25 NOTE — OB LABOR/OXYTOCIN SAFETY PROGRESS
Oxytocin Safety Progress Check Note - Deisi Funez 27 y o  female MRN: [id number]    Unit/Bed#: L&D 326-01 Encounter: [id number]    Dose (citlaly-units/min) Oxytocin: 8 citlaly-units/min  Contraction Frequency (minutes): 2-3  Contraction Quality: Moderate, Strong  Tachysystole: No   Cervical Dilation: 7        Cervical Effacement: 90  Fetal Station: 0  Baseline Rate: 120 bpm     FHR Category: Category I  Oxytocin Safety Progress Check: Safety check completed            Vital Signs:   Vitals:    08/25/22 0845   BP: 111/71   Pulse: 63   Resp:    Temp:        Notes/comments: Patient feeling a lot of pressure  SVE 7/90/0   FHT cat Zachary Walter MD 8/25/2022 9:19 AM

## 2022-08-25 NOTE — DISCHARGE SUMMARY
Discharge Summary - OB/GYN   Sheila Evans 27 y o  female MRN: [id number]  Unit/Bed#: L&D 326-01 Encounter: [id number]      Admission Date: 2022     Discharge Date: 2022    Admitting Diagnosis:   1  Pregnancy at 39w1d  2  A1GDM  3  PEPE  4  MDD    Discharge Diagnosis:   Same, delivered      Procedures: spontaneous vaginal delivery, repair of 1st degree lac    Delivering Attending: Precious Morales MD  Discharge Attending: Dr Klever Uriarte Course:     Sheila Evans is a 27 y o  [id number] at 39w1d wks who was initially admitted for induction of labor  She was 250/-2 on arrival  She was induced with vaginal cytotec and pitocin  She was AROM for clear fluid at 0750, received an epidural and progressed to complete at 0925  She delivered a viable female  on 22 at 36  Weight 7lbs 15oz via spontaneous vaginal delivery  Apgars were 9 (1 min) and 9 (5 min)   was transferred to  nursery  Patient tolerated the procedure well and was transferred to recovery in stable condition  Her post-partum course was complicated  Her post-partum pain was well controlled with oral analgesics  On day of discharge, she was ambulating and able to reasonably perform all ADLs  She was voiding and had appropriate bowel function  Pain was well controlled  She was discharged home on post-partum day #2 without complications  Patient was instructed to follow up with her OB as an outpatient and was given appropriate warnings to call provider if she develops signs of infection or uncontrolled pain  Complications: none apparent    Condition at discharge: good     Discharge instructions/Information to patient and family:   See after visit summary for information provided to patient and family  Provisions for Follow-Up Care:  See after visit summary for information related to follow-up care and any pertinent home health orders        Disposition: Home    Planned Readmission: No    Discharge Medications: For a complete list of the patient's medications, please refer to her med rec

## 2022-08-25 NOTE — LACTATION NOTE
This note was copied from a baby's chart  CONSULT - LACTATION  Baby Girl Wyline Pickerel) Radzai 0 days female MRN: [id number]    [address] [facility] NURSERY Room / Bed: L&D 326(N)/L&D 326(N) Encounter: [id number]    Maternal Information     MOTHER:  Junaid Arellano  Maternal Age: 27 y o    OB History: # 1 - Date: , Sex: None, Weight: None, GA: 9w0d, Delivery: None, Apgar1: None, Apgar5: None, Living: None, Birth Comments: surgically induced     # 2 - Date: 20, Sex: Female, Weight: 3487 g (7 lb 11 oz), GA: 39w3d, Delivery: Vaginal, Vacuum (Extractor), Apgar1: 7, Apgar5: 9, Living: Living, Birth Comments: None    # 3 - Date: 22, Sex: Female, Weight: 3600 g (7 lb 15 oz), GA: 39w1d, Delivery: Vaginal, Spontaneous, Apgar1: 9, Apgar5: 9, Living: Living, Birth Comments: None   Previouse breast reduction surgery? No    Lactation history:   Has patient previously breast fed: Yes   How long had patient previously breast fed: 2 year 1 month   Previous breast feeding complications:  Other (Comment), Breast/nipple pain (blocked milk ducts)     Past Surgical History:   Procedure Laterality Date    APPENDECTOMY      DILATION AND EVACUATION      TONSILLECTOMY      WISDOM TOOTH EXTRACTION          Birth information:  YOB: 2024   Time of birth: 10:37 AM   Sex: female   Delivery type: Vaginal, Spontaneous   Birth Weight: 3600 g (7 lb 15 oz)   Percent of Weight Change: 0%     Gestational Age: 36w3d   [unfilled]    Assessment     Breast and nipple assessment: normal assessment    [place] Assessment: restricted tongue movement    Feeding assessment: feeding well  LATCH:  Latch: Grasps breast, tongue down, lips flanged, rhythmic sucking   Audible Swallowing: Spontaneous and intermittent (24 hours old)   Type of Nipple: Everted (After stimulation)   Comfort (Breast/Nipple): Soft/non-tender   Hold (Positioning): Partial assist, teach one side, mother does other, staff holds LATCH Score: 9          Feeding recommendations:  breast feed on demand     RSB reviewed  D/C booklet at bedside  Paris Avilez has tongue restrictions as older sister did  Hx [id number]  HE effective  Marisela Sanam was GDM  See Cara score    [place] Assessment for Lingual Frenulum Function    Appearance Items Function Items   Appearance of tongue when lifted  0: Heart- or V-shaped   Lateralization  1: Body of tongue but not tongue tip   Elasticity of frenulum  1: Moderately elastic   Lift of tongue  0: Tip stays at lower alveloar ridge or rises to mid-mouth only with jaw closure     Length of lingual frenulum when tongue lifted  lingual frenulum length: 0: < 1cm     Extension of tongue  1: Tip over lower gum only   Attachment of lingual frenulum to tongue  1: At tip   Spread of anterior tongue  1: Moderate of partial   Attachment of lingual frenulum to inferior alveolar ridge  0: Notched tip: Attached at ridge Cupping  1: Side edges only, moderate cup   Ankyloglossia Grading:  Class I: mild, 12-16 mm  Class II: moderate, 8-11 mm  Class III: severe, 3-7 mm  ClassIV: complete, less than 3 mm Peristalsis  2: Complete, anterior to posterior       SCORE:    Appearance: 2 (<8=ankyloglossia)  Function: 8 (<11=ankyloglossia) Snapback  2: None     Worked on positioning infant up at chest level and starting to feed infant with nose arriving at the nipple  Then, using areolar compression to achieve a deep latch that is comfortable and exchanges optimum amounts of milk  Reviewed how to bring baby to the breast so that her lower lip and chin touch the breast with her nose just above the nipple to encourage a wider, more asymmetric latch  Information on hand expression given  Discussed benefits of knowing how to manually express breast including stimulating milk supply, softening nipple for latch and evacuating breast in the event of engorgement  Met with mother   Provided mother with Ready, Set, Baby booklet which contained information on:  Hand expression with access to QR codes to review hand expression  Positioning and latch reviewed as well as showing images of other feeding positions  Discussed the properties of a good latch in any position  Feeding on cue and what that means for recognizing infant's hunger, s/s that baby is getting enough milk and some s/s that breastfeeding dyad may need further help  Skin to Skin contact an benefits to mom and baby  Avoidance of pacifiers for the first month discussed  Gave information on common concerns, what to expect the first few weeks after delivery, preparing for other caregivers, and how partners can help  Resources for support also provided  Encouraged parents to call for assistance, questions, and concerns about breastfeeding  Extension provided        Andrés Resendez RN 8/25/2022 4:00 PM

## 2022-08-25 NOTE — L&D DELIVERY NOTE
DELIVERY NOTE  Jess Loya 27 y o  female MRN: [id number]  Unit/Bed#: &D 326-01 Encounter: [id number]    Obstetrician:    Dr Merle Pickering MD    Assistant:   Dr Landon Stoll MD    Pre-Delivery Diagnosis:   1  Pregnancy at 39w1d  2  A1GDM  3  Depression/Anxiety    Post-Delivery Diagnosis:   Same as above - Delivered  Viable male or female fetus  1st degree laceration    Procedure:  Spontaneous vaginal delivery  Repair 1st degree spontaneous laceration      Anesthesia:  epidural    Specimens:   Cord blood obtained   Placenta; normal appearing, central insertion, intact   Arterial and venous blood gases (below)     Gases:  Umbilical Cord Venous Blood Gas:  Results from last 7 days   Lab Units 22  1037   PH COV  7 345   PCO2 COV mm HG 39 4   HCO3 COV mmol/L 21 0   BASE EXC COV mmol/L -4 2*   O2 CT CD VB mL/dL 17 1   O2 HGB, VENOUS CORD % 25 3     Umbilical Cord Arterial Blood Gas:  Results from last 7 days   Lab Units 22  1037   PH COA  7 211*   PCO2 COA  61 1*   PO2 COA mm HG 24 4   HCO3 COA mmol/L 23 9   BASE EXC COA mmol/L -5 2*   O2 CONTENT CORD ART ml/dl 10 1   O2 HGB, ARTERIAL CORD % 43 1       Quantitative Blood Loss:   100 mL           Complications:    None    Brief Description of Labor Course:  Jess Loya is a 27 y o   female at 39w1d who was admitted to L&D for induction of labor  Her labor course was notable for induction with vaginal cytotec and Pitocin  She was 2/50/-2 on arrival  She was AROM at 0750  She progressed to complete at 0925, pushed for 67 min, and delivered a healthy  at 36  Description of Delivery:   With  the assistance of maternal expulsive forces, the fetal vertex delivered spontaneously  A nuchal cord was not noted  The anterior right shoulder was delivered atraumatically with gentle downward traction  The contralateral arm was delivered with gentle upward traction   The remainder of the fetus delivered spontaneously at 36, resulting in a viable female   Upon delivery, the infant was placed on the mothers abdomen and the cord was doubly clamped and cut after 30 seconds  The  was noted to have good tone and cry spontaneously  There was no evidence of injury  The  was passed off to  staff for evaluation  Umbilical cord blood and umbilical artery and venous gases were collected and sent to the lab  An intact placenta was delivered spontaneously at 1042 using fundal massage and gentle cord traction and was noted to have a centrally-inserted 3-vessel cord  Active management of the third stage of labor was undertaken with IV pitocin at 250milliunits/min  Inspection of the perineum, vagina, labia, cervix, and urethra revealed a 1st degree laceration  Bleeding was noted to be under control  A bimanual exam was performed   Outcome:  Living  with APGARS 9 (1 min) and 9 (5 min)   weight: pending    Perineal Inspection/Laceration Repair  Inspection of the perineum, vagina, labia, cervix, and urethra revealed a 1st degree laceration, which was repaired with two figure-8 stitches with 3-0 Vicryl rapide  Patient was comfortable with epidural at that time  The laceration showed good tissue reapproximation and hemostasis  At the conclusion of the delivery, all needle, sponge, and instrument counts were noted to be correct  Patient tolerated the procedure well and was allowed to recover in labor and delivery room with family and  before being transferred to the post-partum floor  Conclusion:  Mother and baby are currently recovering nicely in stable condition  Attending Supervision:   Dr Enrique Disla MD was present for the entire procedure      Eusebio Alexander MD MD  OB/GYN PGY-1   2022 10:59 AM

## 2022-08-25 NOTE — PLAN OF CARE
Problem: PAIN - ADULT  Goal: Verbalizes/displays adequate comfort level or baseline comfort level  Description: Interventions:  - Encourage patient to monitor pain and request assistance  - Assess pain using appropriate pain scale  - Administer analgesics based on type and severity of pain and evaluate response  - Implement non-pharmacological measures as appropriate and evaluate response  - Consider cultural and social influences on pain and pain management  - Notify physician/advanced practitioner if interventions unsuccessful or patient reports new pain  Outcome: Progressing     Problem: INFECTION - ADULT  Goal: Absence or prevention of progression during hospitalization  Description: INTERVENTIONS:  - Assess and monitor for signs and symptoms of infection  - Monitor lab/diagnostic results  - Monitor all insertion sites, i e  indwelling lines, tubes, and drains  - Monitor endotracheal if appropriate and nasal secretions for changes in amount and color  - [place] appropriate cooling/warming therapies per order  - Administer medications as ordered  - Instruct and encourage patient and family to use good hand hygiene technique  - Identify and instruct in appropriate isolation precautions for identified infection/condition  Outcome: Progressing  Goal: Absence of fever/infection during neutropenic period  Description: INTERVENTIONS:  - Monitor WBC    Outcome: Progressing     Problem: SAFETY ADULT  Goal: Patient will remain free of falls  Description: INTERVENTIONS:  - Educate patient/family on patient safety including physical limitations  - Instruct patient to call for assistance with activity   - Consult OT/PT to assist with strengthening/mobility   - Keep Call bell within reach  - Keep bed low and locked with side rails adjusted as appropriate  - Keep care items and personal belongings within reach  - Initiate and maintain comfort rounds  - Make Fall Risk Sign visible to staff  - Apply yellow socks and bracelet for high fall risk patients  - Consider moving patient to room near nurses station  Outcome: Progressing  Goal: Maintain or return to baseline ADL function  Description: INTERVENTIONS:  -  Assess patient's ability to carry out ADLs; assess patient's baseline for ADL function and identify physical deficits which impact ability to perform ADLs (bathing, care of mouth/teeth, toileting, grooming, dressing, etc )  - Assess/evaluate cause of self-care deficits   - Assess range of motion  - Assess patient's mobility; develop plan if impaired  - Assess patient's need for assistive devices and provide as appropriate  - Encourage maximum independence but intervene and supervise when necessary  - Involve family in performance of ADLs  - Assess for home care needs following discharge   - Consider OT consult to assist with ADL evaluation and planning for discharge  - Provide patient education as appropriate  Outcome: Progressing  Goal: Maintains/Returns to pre admission functional level  Description: INTERVENTIONS:  - Perform BMAT or MOVE assessment daily    - Set and communicate daily mobility goal to care team and patient/family/caregiver  - Collaborate with rehabilitation services on mobility goals if consulted  - Out of bed for toileting  - Record patient progress and toleration of activity level   Outcome: Progressing     Problem: Knowledge Deficit  Goal: Patient/family/caregiver demonstrates understanding of disease process, treatment plan, medications, and discharge instructions  Description: Complete learning assessment and assess knowledge base    Interventions:  - Provide teaching at level of understanding  - Provide teaching via preferred learning methods  Outcome: Progressing     Problem: DISCHARGE PLANNING  Goal: Discharge to home or other facility with appropriate resources  Description: INTERVENTIONS:  - Identify barriers to discharge w/patient and caregiver  - Arrange for needed discharge resources and transportation as appropriate  - Identify discharge learning needs (meds, wound care, etc )  - Arrange for interpretive services to assist at discharge as needed  - Refer to Case Management Department for coordinating discharge planning if the patient needs post-hospital services based on physician/advanced practitioner order or complex needs related to functional status, cognitive ability, or social support system  Outcome: Progressing

## 2022-08-25 NOTE — OB LABOR/OXYTOCIN SAFETY PROGRESS
Oxytocin Safety Progress Check Note - Alyce Silverio 27 y o  female MRN: [id number]    Unit/Bed#: L&D 326-01 Encounter: [id number]    Alyce Silverio is a 27 y o   at 39w1d who is undergoing labor induction at term in the setting of A1GDM  Induction  - s/p Cytotec, oxytocin titration, s/p AROM  - EFW 74%ile, AC 98%ile- previously counseled regarding shoulder dystocia risk, see prior notes        A1GDM  - Euglycemic, continue poct glucose per protocol      Objective:  /67   Pulse 68   Temp 98 6 °F (37 °C) (Oral)   Resp 18   Ht 5' 5" (1 651 m)   Wt 70 3 kg (155 lb)   LMP 2021   BMI 25 79 kg/m²     Dose (citlaly-units/min) Oxytocin: 8 citlaly-units/min  Contraction Frequency (minutes): 2-3  Contraction Quality: Mild, Moderate  Tachysystole: No   Cervical Dilation: 4        Cervical Effacement: 80  Fetal Station: -3  Baseline Rate: 120 bpm     FHR Category: Category I               Notes/comments:         Ramses Luevano MD 2022 8:10 AM

## 2022-08-25 NOTE — ASSESSMENT & PLAN NOTE
Admit  Follow up CBC, RPR, Blood Type  Start with cytotec  Method of contraception: condoms to vasectomy  GBS  status: negative  Analgesia and/or epidural at patient request  Anticipate

## 2022-08-25 NOTE — OB LABOR/OXYTOCIN SAFETY PROGRESS
Labor Progress Note - Debbie Carrasco 27 y o  female MRN: [id number]    Unit/Bed#: L&D 326-01 Encounter: [id number]       Contraction Frequency (minutes): 3-4  Contraction Quality: Mild  Tachysystole: No   Cervical Dilation: 4        Cervical Effacement: 50  Fetal Station: -2  Baseline Rate: 120 bpm                 Vital Signs:   Vitals:    08/24/22 2253   BP: 105/61   Pulse: 73   Resp:    Temp:        Notes/comments:   Pt is resting comfortably  She reports her contractions started decreasing in intensity over the past 45 minutes  SVE as above  FHT cat 1 issa q5 mins  Will start pitocin now      Dr Tigist Stevens aware     Patrick Calvin MD 8/25/2022 4:49 AM

## 2022-08-25 NOTE — ASSESSMENT & PLAN NOTE
Lab Results   Component Value Date    HGBA1C 5 1 04/02/2022       No results for input(s): POCGLU in the last 72 hours      Blood Sugar Average: Last 72 hrs:     - A1GDM protocol for POCT glucose  - Insulin gtt as needed

## 2022-08-25 NOTE — ANESTHESIA PROCEDURE NOTES
Epidural Block    Patient location during procedure: OB  Start time: 8/25/2022 9:09 AM  Reason for block: procedure for pain, at surgeon's request and primary anesthetic  Staffing  Performed: Anesthesiologist   Anesthesiologist: Aline Vance DO  Preanesthetic Checklist  Completed: patient identified, IV checked, site marked, risks and benefits discussed, surgical consent, monitors and equipment checked, pre-op evaluation and timeout performed  Epidural  Patient position: sitting  Prep: Betadine  Patient monitoring: frequent blood pressure checks  Approach: midline  Location: lumbar  Injection technique: FREDI air  Needle  Needle type: Tuohy   Catheter at skin depth: 10 cm  Catheter securement method: clear occlusive dressing  Test dose: negative  Assessment  Number of attempts: 1negative aspiration for CSF, negative aspiration for heme and no paresthesia on injection  patient tolerated the procedure well with no immediate complications

## 2022-08-26 PROCEDURE — 99024 POSTOP FOLLOW-UP VISIT: CPT | Performed by: OBSTETRICS & GYNECOLOGY

## 2022-08-26 RX ADMIN — IBUPROFEN 600 MG: 600 TABLET ORAL at 09:31

## 2022-08-26 RX ADMIN — IBUPROFEN 600 MG: 600 TABLET ORAL at 03:18

## 2022-08-26 RX ADMIN — ACETAMINOPHEN 650 MG: 325 TABLET, FILM COATED ORAL at 00:42

## 2022-08-26 RX ADMIN — DOCUSATE SODIUM 100 MG: 100 CAPSULE, LIQUID FILLED ORAL at 09:31

## 2022-08-26 RX ADMIN — IBUPROFEN 600 MG: 600 TABLET ORAL at 21:54

## 2022-08-26 RX ADMIN — IBUPROFEN 600 MG: 600 TABLET ORAL at 15:05

## 2022-08-26 RX ADMIN — ACETAMINOPHEN 650 MG: 325 TABLET, FILM COATED ORAL at 18:47

## 2022-08-26 RX ADMIN — SERTRALINE HYDROCHLORIDE 75 MG: 50 TABLET ORAL at 09:31

## 2022-08-26 RX ADMIN — DOCUSATE SODIUM 100 MG: 100 CAPSULE, LIQUID FILLED ORAL at 18:47

## 2022-08-26 NOTE — ASSESSMENT & PLAN NOTE
Doing well  Tolerating PO well  Ambulates without issues  Rh positive  A1GDM  Adequate bowel and bladder function  Pain is well controlled  Breastfeeding difficulties but working with lactation consultant  Contraception plan: Vasectomy       Plan:  - Anticipate discharge PPD#2

## 2022-08-26 NOTE — PLAN OF CARE
Problem: PAIN - ADULT  Goal: Verbalizes/displays adequate comfort level or baseline comfort level  Description: Interventions:  - Encourage patient to monitor pain and request assistance  - Assess pain using appropriate pain scale  - Administer analgesics based on type and severity of pain and evaluate response  - Implement non-pharmacological measures as appropriate and evaluate response  - Consider cultural and social influences on pain and pain management  - Notify physician/advanced practitioner if interventions unsuccessful or patient reports new pain  Outcome: Progressing     Problem: INFECTION - ADULT  Goal: Absence or prevention of progression during hospitalization  Description: INTERVENTIONS:  - Assess and monitor for signs and symptoms of infection  - Monitor lab/diagnostic results  - Monitor all insertion sites, i e  indwelling lines, tubes, and drains  - Monitor endotracheal if appropriate and nasal secretions for changes in amount and color  - [place] appropriate cooling/warming therapies per order  - Administer medications as ordered  - Instruct and encourage patient and family to use good hand hygiene technique  - Identify and instruct in appropriate isolation precautions for identified infection/condition  Outcome: Progressing  Goal: Absence of fever/infection during neutropenic period  Description: INTERVENTIONS:  - Monitor WBC    Outcome: Progressing     Problem: SAFETY ADULT  Goal: Patient will remain free of falls  Description: INTERVENTIONS:  - Educate patient/family on patient safety including physical limitations  - Instruct patient to call for assistance with activity   - Consult OT/PT to assist with strengthening/mobility   - Keep Call bell within reach  - Keep bed low and locked with side rails adjusted as appropriate  - Keep care items and personal belongings within reach  - Initiate and maintain comfort rounds  Outcome: Progressing  Goal: Maintain or return to baseline ADL function  Description: INTERVENTIONS:  -  Assess patient's ability to carry out ADLs; assess patient's baseline for ADL function and identify physical deficits which impact ability to perform ADLs (bathing, care of mouth/teeth, toileting, grooming, dressing, etc )  - Assess/evaluate cause of self-care deficits   - Assess range of motion  - Assess patient's mobility; develop plan if impaired  - Assess patient's need for assistive devices and provide as appropriate  - Encourage maximum independence but intervene and supervise when necessary  - Involve family in performance of ADLs  - Assess for home care needs following discharge   - Consider OT consult to assist with ADL evaluation and planning for discharge  - Provide patient education as appropriate  Outcome: Progressing  Goal: Maintains/Returns to pre admission functional level  Description: INTERVENTIONS:  - Perform BMAT or MOVE assessment daily    - Set and communicate daily mobility goal to care team and patient/family/caregiver  - Collaborate with rehabilitation services on mobility goals if consulted  Outcome: Progressing     Problem: Knowledge Deficit  Goal: Patient/family/caregiver demonstrates understanding of disease process, treatment plan, medications, and discharge instructions  Description: Complete learning assessment and assess knowledge base    Interventions:  - Provide teaching at level of understanding  - Provide teaching via preferred learning methods  Outcome: Progressing     Problem: DISCHARGE PLANNING  Goal: Discharge to home or other facility with appropriate resources  Description: INTERVENTIONS:  - Identify barriers to discharge w/patient and caregiver  - Arrange for needed discharge resources and transportation as appropriate  - Identify discharge learning needs (meds, wound care, etc )  - Arrange for interpretive services to assist at discharge as needed  - Refer to Case Management Department for coordinating discharge planning if the patient needs post-hospital services based on physician/advanced practitioner order or complex needs related to functional status, cognitive ability, or social support system  Outcome: Progressing

## 2022-08-26 NOTE — LACTATION NOTE
This note was copied from a baby's chart  CONSULT - LACTATION  Baby Girl Valente Kennedy 69 1 days female MRN: [id number]    Count includes the [facility] NURSERY Room / Bed: L&D 308(N)/L&D 308(N) Encounter: [id number]    Maternal Information     MOTHER:  Nai Cleaning  Maternal Age: 27 y o    OB History: # 1 - Date: , Sex: None, Weight: None, GA: 9w0d, Delivery: None, Apgar1: None, Apgar5: None, Living: None, Birth Comments: surgically induced     # 2 - Date: 20, Sex: Female, Weight: 3487 g (7 lb 11 oz), GA: 39w3d, Delivery: Vaginal, Vacuum (Extractor), Apgar1: 7, Apgar5: 9, Living: Living, Birth Comments: None    # 3 - Date: 22, Sex: Female, Weight: 3600 g (7 lb 15 oz), GA: 39w1d, Delivery: Vaginal, Spontaneous, Apgar1: 9, Apgar5: 9, Living: Living, Birth Comments: None   Previouse breast reduction surgery? No    Lactation history:   Has patient previously breast fed: Yes   How long had patient previously breast fed: 2 year 1 month   Previous breast feeding complications: Other (Comment), Breast/nipple pain (blocked milk ducts)     Past Surgical History:   Procedure Laterality Date    APPENDECTOMY      DILATION AND EVACUATION      TONSILLECTOMY      WISDOM TOOTH EXTRACTION          Birth information:  YOB: 2024   Time of birth: 10:37 AM   Sex: female   Delivery type: Vaginal, Spontaneous   Birth Weight: 3600 g (7 lb 15 oz)   Percent of Weight Change: -1%     Gestational Age: 36w3d   [unfilled]    Assessment     Breast and nipple assessment: long nipple    [place] Assessment: restricted tongue movement    Feeding assessment: latch difficulty (baby would come on and off until adjusted   Mother shown how to compress breast and bring in baby chin first for a deep latch )  LATCH:  Latch: Grasps breast, tongue down, lips flanged, rhythmic sucking (Pulls back on breast)   Audible Swallowing: Spontaneous and intermittent (24 hours old)   Type of Nipple: Everted (After stimulation)   Comfort (Breast/Nipple): Soft/non-tender   Hold (Positioning): Partial assist, teach one side, mother does other, staff holds   [facility] Score: 9          Feeding recommendations:  breast feed on demand     Met with mother to assist with position/latch  Mother had baby in cross cradle and was crying, coming on and off  Mother discussed that she has a tongue tie and nipples were noted to be long  Mother hand expressed breast milk that was finger fed to baby to calm down and re-attempt latch  Baby latched after a few attempts on the left side in cross cradle  Mother was shown how to compress the breast and bring her in chin first to help get a deep latch         Bethanie Davis RN 8/26/2022 12:06 PM

## 2022-08-26 NOTE — PROGRESS NOTES
Progress Note - OB/GYN  Alli Ferrer 27 y o  female MRN: [id number]  Unit/Bed#: L&D 308-01 Encounter: [id number]    Assessment and Plan     Alli Ferrer is a patient of: [facility]  She is PPD#1 s/p  spontaneous vaginal delivery  Recovering well and is stable       Encounter for induction of labor  Assessment & Plan  Admit  Follow up CBC, RPR, Blood Type  Start with cytotec  Method of contraception: condoms to vasectomy  GBS  status: negative  Analgesia and/or epidural at patient request  Anticipate     Gestational diabetes mellitus (GDM) in third trimester  Assessment & Plan  Lab Results   Component Value Date    HGBA1C 5 1 2022       No results for input(s): POCGLU in the last 72 hours  Blood Sugar Average: Last 72 hrs:     - A1GDM protocol for POCT glucose  - Insulin gtt as needed    PEPE (generalized anxiety disorder)  Assessment & Plan  Home Zoloft ordered    *  (spontaneous vaginal delivery)  Assessment & Plan  Doing well  Tolerating PO well  Ambulates without issues  Rh positive  A1GDM  Adequate bowel and bladder function  Pain is well controlled  Breastfeeding difficulties but working with lactation consultant  Contraception plan: Vasectomy       Plan:  - Lactation consult today        Disposition    - Anticipate discharge home on PPD#2      Subjective/Objective     Chief Complaint: Postpartum State     Subjective:    Alli Ferrer is PPD #1 s/p  spontaneous vaginal delivery  She has no current complaints  Pain is well controlled  Patient is currently voiding  She is ambulating  Patient is currently passing flatus and has had no bowel movement  She is tolerating PO, and denies nausea or vomitting  Patient denies fever, chills, chest pain, shortness of breath, or calf tenderness  Lochia is minimal  She is  Breastfeeding  She is recovering well and is stable         Vitals:   /68 (BP Location: Left arm)   Pulse 63   Temp 97 7 °F (36 5 °C) (Oral)   Resp 18   Ht 5' 5" (1 651 m)   Wt 70 3 kg (155 lb)   LMP 11/24/2021   SpO2 98%   Breastfeeding Yes   BMI 25 79 kg/m²       Intake/Output Summary (Last 24 hours) at 8/26/2022 0655  Last data filed at 8/25/2022 1701  Gross per 24 hour   Intake --   Output 1360 ml   Net -1360 ml       Invasive Devices  Timeline    Peripheral Intravenous Line  Duration           Peripheral IV 08/24/22 Dorsal (posterior); Left Hand 1 day                Physical Exam:   GEN: Zoe Segal appears well, alert and oriented x 3, pleasant and cooperative   CARDIO: RRR, no murmurs or rubs  RESP:  CTAB, no wheezes or rales  ABDOMEN: soft, no tenderness, no distention, fundus below umbilicus  EXTREMITIES: Non tender, no erythema      Labs:     Hemoglobin   Date Value Ref Range Status   08/24/2022 12 9 11 5 - 15 4 g/dL Final   06/20/2022 13 3 11 5 - 15 4 g/dL Final     WBC   Date Value Ref Range Status   08/24/2022 10 67 (H) 4 31 - 10 16 Thousand/uL Final   06/20/2022 13 43 (H) 4 31 - 10 16 Thousand/uL Final     Platelets   Date Value Ref Range Status   08/24/2022 201 149 - 390 Thousands/uL Final   06/20/2022 215 149 - 390 Thousands/uL Final     Creatinine   Date Value Ref Range Status   06/20/2022 0 48 (L) 0 60 - 1 30 mg/dL Final     Comment:     Standardized to IDMS reference method   11/14/2019 0 54 (L) 0 60 - 1 30 mg/dL Final     Comment:     Standardized to IDMS reference method     AST   Date Value Ref Range Status   06/20/2022 19 13 - 39 U/L Final     Comment:     Slightly Hemolyzed:Results may be affected  Specimen collection should occur prior to Sulfasalazine administration due to the potential for falsely depressed results  11/14/2019 12 5 - 45 U/L Final     Comment:       Specimen collection should occur prior to Sulfasalazine administration due to the potential for falsely depressed results        ALT   Date Value Ref Range Status   06/20/2022 13 7 - 52 U/L Final     Comment:     Specimen collection should occur prior to Sulfasalazine administration due to the potential for falsely depressed results  11/14/2019 17 12 - 78 U/L Final     Comment:       Specimen collection should occur prior to Sulfasalazine and/or Sulfapyridine administration due to the potential for falsely depressed results             Matt Kapoor MD  8/26/2022  6:55 AM

## 2022-08-26 NOTE — PLAN OF CARE
Problem: PAIN - ADULT  Goal: Verbalizes/displays adequate comfort level or baseline comfort level  Description: Interventions:  - Encourage patient to monitor pain and request assistance  - Assess pain using appropriate pain scale  - Administer analgesics based on type and severity of pain and evaluate response  - Implement non-pharmacological measures as appropriate and evaluate response  - Consider cultural and social influences on pain and pain management  - Notify physician/advanced practitioner if interventions unsuccessful or patient reports new pain  Outcome: Progressing     Problem: INFECTION - ADULT  Goal: Absence or prevention of progression during hospitalization  Description: INTERVENTIONS:  - Assess and monitor for signs and symptoms of infection  - Monitor lab/diagnostic results  - Monitor all insertion sites, i e  indwelling lines, tubes, and drains  - Monitor endotracheal if appropriate and nasal secretions for changes in amount and color  - [place] appropriate cooling/warming therapies per order  - Administer medications as ordered  - Instruct and encourage patient and family to use good hand hygiene technique  - Identify and instruct in appropriate isolation precautions for identified infection/condition  Outcome: Progressing  Goal: Absence of fever/infection during neutropenic period  Description: INTERVENTIONS:  - Monitor WBC    Outcome: Progressing     Problem: SAFETY ADULT  Goal: Patient will remain free of falls  Description: INTERVENTIONS:  - Educate patient/family on patient safety including physical limitations  - Instruct patient to call for assistance with activity   - Consult OT/PT to assist with strengthening/mobility   - Keep Call bell within reach  - Keep bed low and locked with side rails adjusted as appropriate  - Keep care items and personal belongings within reach  - Initiate and maintain comfort rounds  - Make Fall Risk Sign visible to staff  - Offer Toileting every  Hours, in advance of need  - Initiate/Maintain alarm  - Obtain necessary fall risk management equipment:   - Apply yellow socks and bracelet for high fall risk patients  - Consider moving patient to room near nurses station  Outcome: Progressing  Goal: Maintain or return to baseline ADL function  Description: INTERVENTIONS:  -  Assess patient's ability to carry out ADLs; assess patient's baseline for ADL function and identify physical deficits which impact ability to perform ADLs (bathing, care of mouth/teeth, toileting, grooming, dressing, etc )  - Assess/evaluate cause of self-care deficits   - Assess range of motion  - Assess patient's mobility; develop plan if impaired  - Assess patient's need for assistive devices and provide as appropriate  - Encourage maximum independence but intervene and supervise when necessary  - Involve family in performance of ADLs  - Assess for home care needs following discharge   - Consider OT consult to assist with ADL evaluation and planning for discharge  - Provide patient education as appropriate  Outcome: Progressing  Goal: Maintains/Returns to pre admission functional level  Description: INTERVENTIONS:  - Perform BMAT or MOVE assessment daily    - Set and communicate daily mobility goal to care team and patient/family/caregiver  - Collaborate with rehabilitation services on mobility goals if consulted  - Perform Range of Motion  times a day  - Reposition patient every  hours    - Dangle patient  times a day  - Stand patient times a day  - Ambulate patient  times a day  - Out of bed to chair  times a day   - Out of bed for meal times a day  - Out of bed for toileting  - Record patient progress and toleration of activity level   Outcome: Progressing     Problem: Knowledge Deficit  Goal: Patient/family/caregiver demonstrates understanding of disease process, treatment plan, medications, and discharge instructions  Description: Complete learning assessment and assess knowledge base   Interventions:  - Provide teaching at level of understanding  - Provide teaching via preferred learning methods  Outcome: Progressing     Problem: DISCHARGE PLANNING  Goal: Discharge to home or other facility with appropriate resources  Description: INTERVENTIONS:  - Identify barriers to discharge w/patient and caregiver  - Arrange for needed discharge resources and transportation as appropriate  - Identify discharge learning needs (meds, wound care, etc )  - Arrange for interpretive services to assist at discharge as needed  - Refer to Case Management Department for coordinating discharge planning if the patient needs post-hospital services based on physician/advanced practitioner order or complex needs related to functional status, cognitive ability, or social support system  Outcome: Progressing

## 2022-08-26 NOTE — LACTATION NOTE
This note was copied from a baby's chart  Met with parents to follow up on feeding with last encounter  Mother discussed that her baby fed on both sides (1 hour feeding)  Mother called [phone] and left message for call back to schedule for a frenotomy as discussed with a prior lactation consultation  Mother was encouraged to re-attempt to call if she does not hear back today  The Breastfeeding Discharge Booklet was discussed  Discussed feeding log to continue using once home for up to the week ensuring that baby feeds 8-12x in 24 hours and that baby has 6-8 wet diapers as well as 3-4 soiled diapers (looking for stool transition from meconium to a yellow/gold seedy loose stool)  Mother given resources to look up medications to ensure they are safe with breastfeeding, by communicating with the [employer] [address] as well as using [employer] (assisted mother to pin to home screen on personal phone)    Mother aware of engorgement time frame (when mature milk comes in) and management as well as how to deal with conditions that may occur while breastfeeding (plugged ducts, milk blebs and mastitis) and when is appropriate to communicate with her OB/GYN and/or a lactation consultant  Mother comfortable with how to set up a pump, how to cycle (stimulation vs expression phases during a pumping session), milk storage and cleaning  Mother shown handouts for tips on pumping when returning to work and paced bottle feeding  Mother shown community resources for continued support in breastfeeding once discharged and encouraged to communicate with [employer] and/or a lactation consultant to further assistance once home

## 2022-08-27 VITALS
TEMPERATURE: 97.8 F | WEIGHT: 155 LBS | BODY MASS INDEX: 25.83 KG/M2 | SYSTOLIC BLOOD PRESSURE: 115 MMHG | DIASTOLIC BLOOD PRESSURE: 58 MMHG | RESPIRATION RATE: 16 BRPM | HEIGHT: 65 IN | OXYGEN SATURATION: 98 % | HEART RATE: 72 BPM

## 2022-08-27 PROBLEM — Z34.90 ENCOUNTER FOR INDUCTION OF LABOR: Status: RESOLVED | Noted: 2022-08-24 | Resolved: 2022-08-27

## 2022-08-27 PROCEDURE — 99024 POSTOP FOLLOW-UP VISIT: CPT | Performed by: OBSTETRICS & GYNECOLOGY

## 2022-08-27 RX ORDER — DIAPER,BRIEF,INFANT-TODD,DISP
1 EACH MISCELLANEOUS DAILY PRN
Qty: 30 G | Refills: 0
Start: 2022-08-27

## 2022-08-27 RX ORDER — SERTRALINE HYDROCHLORIDE 25 MG/1
75 TABLET, FILM COATED ORAL DAILY
Refills: 0
Start: 2022-08-27 | End: 2022-09-21 | Stop reason: SDUPTHER

## 2022-08-27 RX ORDER — ACETAMINOPHEN 325 MG/1
650 TABLET ORAL EVERY 4 HOURS PRN
Refills: 0
Start: 2022-08-27

## 2022-08-27 RX ORDER — IBUPROFEN 600 MG/1
600 TABLET ORAL EVERY 6 HOURS
Qty: 50 TABLET | Refills: 1 | Status: SHIPPED | OUTPATIENT
Start: 2022-08-27

## 2022-08-27 RX ADMIN — IBUPROFEN 600 MG: 600 TABLET ORAL at 09:57

## 2022-08-27 RX ADMIN — DOCUSATE SODIUM 100 MG: 100 CAPSULE, LIQUID FILLED ORAL at 09:54

## 2022-08-27 RX ADMIN — SERTRALINE HYDROCHLORIDE 75 MG: 50 TABLET ORAL at 09:54

## 2022-08-27 RX ADMIN — IBUPROFEN 600 MG: 600 TABLET ORAL at 04:17

## 2022-08-27 NOTE — PLAN OF CARE
Problem: PAIN - ADULT  Goal: Verbalizes/displays adequate comfort level or baseline comfort level  Description: Interventions:  - Encourage patient to monitor pain and request assistance  - Assess pain using appropriate pain scale  - Administer analgesics based on type and severity of pain and evaluate response  - Implement non-pharmacological measures as appropriate and evaluate response  - Consider cultural and social influences on pain and pain management  - Notify physician/advanced practitioner if interventions unsuccessful or patient reports new pain  Outcome: Progressing     Problem: INFECTION - ADULT  Goal: Absence or prevention of progression during hospitalization  Description: INTERVENTIONS:  - Assess and monitor for signs and symptoms of infection  - Monitor lab/diagnostic results  - Monitor all insertion sites, i e  indwelling lines, tubes, and drains  - Monitor endotracheal if appropriate and nasal secretions for changes in amount and color  - [place] appropriate cooling/warming therapies per order  - Administer medications as ordered  - Instruct and encourage patient and family to use good hand hygiene technique  - Identify and instruct in appropriate isolation precautions for identified infection/condition  Outcome: Progressing  Goal: Absence of fever/infection during neutropenic period  Description: INTERVENTIONS:  - Monitor WBC    Outcome: Progressing     Problem: SAFETY ADULT  Goal: Patient will remain free of falls  Description: INTERVENTIONS:  - Educate patient/family on patient safety including physical limitations  - Instruct patient to call for assistance with activity   - Consult OT/PT to assist with strengthening/mobility   - Keep Call bell within reach  - Keep bed low and locked with side rails adjusted as appropriate  - Keep care items and personal belongings within reach  - Initiate and maintain comfort rounds  - Make Fall Risk Sign visible to staff  - - Apply yellow socks and bracelet for high fall risk patients  - Consider moving patient to room near nurses station  Outcome: Progressing  Goal: Maintain or return to baseline ADL function  Description: INTERVENTIONS:  -  Assess patient's ability to carry out ADLs; assess patient's baseline for ADL function and identify physical deficits which impact ability to perform ADLs (bathing, care of mouth/teeth, toileting, grooming, dressing, etc )  - Assess/evaluate cause of self-care deficits   - Assess range of motion  - Assess patient's mobility; develop plan if impaired  - Assess patient's need for assistive devices and provide as appropriate  - Encourage maximum independence but intervene and supervise when necessary  - Involve family in performance of ADLs  - Assess for home care needs following discharge   - Consider OT consult to assist with ADL evaluation and planning for discharge  - Provide patient education as appropriate  Outcome: Progressing  Goal: Maintains/Returns to pre admission functional level  Description: INTERVENTIONS:  - Perform BMAT or MOVE assessment daily    - Set and communicate daily mobility goal to care team and patient/family/caregiver  - Collaborate with rehabilitation services on mobility goals if consulted  - Out of bed for toileting  - Record patient progress and toleration of activity level   Outcome: Progressing     Problem: Knowledge Deficit  Goal: Patient/family/caregiver demonstrates understanding of disease process, treatment plan, medications, and discharge instructions  Description: Complete learning assessment and assess knowledge base    Interventions:  - Provide teaching at level of understanding  - Provide teaching via preferred learning methods  Outcome: Progressing     Problem: DISCHARGE PLANNING  Goal: Discharge to home or other facility with appropriate resources  Description: INTERVENTIONS:  - Identify barriers to discharge w/patient and caregiver  - Arrange for needed discharge resources and transportation as appropriate  - Identify discharge learning needs (meds, wound care, etc )  - Arrange for interpretive services to assist at discharge as needed  - Refer to Case Management Department for coordinating discharge planning if the patient needs post-hospital services based on physician/advanced practitioner order or complex needs related to functional status, cognitive ability, or social support system  Outcome: Progressing

## 2022-08-27 NOTE — PLAN OF CARE
Problem: PAIN - ADULT  Goal: Verbalizes/displays adequate comfort level or baseline comfort level  Description: Interventions:  - Encourage patient to monitor pain and request assistance  - Assess pain using appropriate pain scale  - Administer analgesics based on type and severity of pain and evaluate response  - Implement non-pharmacological measures as appropriate and evaluate response  - Consider cultural and social influences on pain and pain management  - Notify physician/advanced practitioner if interventions unsuccessful or patient reports new pain  Outcome: Completed     Problem: INFECTION - ADULT  Goal: Absence or prevention of progression during hospitalization  Description: INTERVENTIONS:  - Assess and monitor for signs and symptoms of infection  - Monitor lab/diagnostic results  - Monitor all insertion sites, i e  indwelling lines, tubes, and drains  - Monitor endotracheal if appropriate and nasal secretions for changes in amount and color  - [place] appropriate cooling/warming therapies per order  - Administer medications as ordered  - Instruct and encourage patient and family to use good hand hygiene technique  - Identify and instruct in appropriate isolation precautions for identified infection/condition  Outcome: Completed  Goal: Absence of fever/infection during neutropenic period  Description: INTERVENTIONS:  - Monitor WBC    Outcome: Completed     Problem: DISCHARGE PLANNING  Goal: Discharge to home or other facility with appropriate resources  Description: INTERVENTIONS:  - Identify barriers to discharge w/patient and caregiver  - Arrange for needed discharge resources and transportation as appropriate  - Identify discharge learning needs (meds, wound care, etc )  - Arrange for interpretive services to assist at discharge as needed  - Refer to Case Management Department for coordinating discharge planning if the patient needs post-hospital services based on physician/advanced practitioner order or complex needs related to functional status, cognitive ability, or social support system  Outcome: Completed

## 2022-08-27 NOTE — PLAN OF CARE
Problem: PAIN - ADULT  Goal: Verbalizes/displays adequate comfort level or baseline comfort level  Description: Interventions:  - Encourage patient to monitor pain and request assistance  - Assess pain using appropriate pain scale  - Administer analgesics based on type and severity of pain and evaluate response  - Implement non-pharmacological measures as appropriate and evaluate response  - Consider cultural and social influences on pain and pain management  - Notify physician/advanced practitioner if interventions unsuccessful or patient reports new pain  Outcome: Progressing     Problem: INFECTION - ADULT  Goal: Absence or prevention of progression during hospitalization  Description: INTERVENTIONS:  - Assess and monitor for signs and symptoms of infection  - Monitor lab/diagnostic results  - Monitor all insertion sites, i e  indwelling lines, tubes, and drains  - Monitor endotracheal if appropriate and nasal secretions for changes in amount and color  - [place] appropriate cooling/warming therapies per order  - Administer medications as ordered  - Instruct and encourage patient and family to use good hand hygiene technique  - Identify and instruct in appropriate isolation precautions for identified infection/condition  Outcome: Progressing  Goal: Absence of fever/infection during neutropenic period  Description: INTERVENTIONS:  - Monitor WBC    Outcome: Progressing     Problem: SAFETY ADULT  Goal: Patient will remain free of falls  Description: INTERVENTIONS:  - Educate patient/family on patient safety including physical limitations  - Instruct patient to call for assistance with activity   - Consult OT/PT to assist with strengthening/mobility   - Keep Call bell within reach  - Keep bed low and locked with side rails adjusted as appropriate  - Keep care items and personal belongings within reach  - Initiate and maintain comfort rounds  - Make Fall Risk Sign visible to staff  - Offer Toileting every  Hours, in advance of need  - Initiate/Maintain alarm  - Obtain necessary fall risk management equipment:   - Apply yellow socks and bracelet for high fall risk patients  - Consider moving patient to room near nurses station  Outcome: Progressing  Goal: Maintain or return to baseline ADL function  Description: INTERVENTIONS:  -  Assess patient's ability to carry out ADLs; assess patient's baseline for ADL function and identify physical deficits which impact ability to perform ADLs (bathing, care of mouth/teeth, toileting, grooming, dressing, etc )  - Assess/evaluate cause of self-care deficits   - Assess range of motion  - Assess patient's mobility; develop plan if impaired  - Assess patient's need for assistive devices and provide as appropriate  - Encourage maximum independence but intervene and supervise when necessary  - Involve family in performance of ADLs  - Assess for home care needs following discharge   - Consider OT consult to assist with ADL evaluation and planning for discharge  - Provide patient education as appropriate  Outcome: Progressing  Goal: Maintains/Returns to pre admission functional level  Description: INTERVENTIONS:  - Perform BMAT or MOVE assessment daily    - Set and communicate daily mobility goal to care team and patient/family/caregiver  - Collaborate with rehabilitation services on mobility goals if consulted  - Perform Range of Motion  times a day  - Reposition patient every  hours    - Dangle patient  times a day  - Stand patient  times a day  - Ambulate patient  times a day  - Out of bed to chair times a day   - Out of bed for meals  times a day  - Out of bed for toileting  - Record patient progress and toleration of activity level   Outcome: Progressing     Problem: Knowledge Deficit  Goal: Patient/family/caregiver demonstrates understanding of disease process, treatment plan, medications, and discharge instructions  Description: Complete learning assessment and assess knowledge base   Interventions:  - Provide teaching at level of understanding  - Provide teaching via preferred learning methods  Outcome: Progressing     Problem: DISCHARGE PLANNING  Goal: Discharge to home or other facility with appropriate resources  Description: INTERVENTIONS:  - Identify barriers to discharge w/patient and caregiver  - Arrange for needed discharge resources and transportation as appropriate  - Identify discharge learning needs (meds, wound care, etc )  - Arrange for interpretive services to assist at discharge as needed  - Refer to Case Management Department for coordinating discharge planning if the patient needs post-hospital services based on physician/advanced practitioner order or complex needs related to functional status, cognitive ability, or social support system  Outcome: Progressing

## 2022-08-27 NOTE — PROGRESS NOTES
Progress Note - OB/GYN   Richard Bear 27 y o  female MRN: [id number]  Unit/Bed#: L&D 308-01 Encounter: [id number]    Assessment:  Post partum Day #2 s/p , stable, baby in room    Plan:  Gestational diabetes mellitus (GDM) in third trimester  Assessment & Plan  Lab Results   Component Value Date    HGBA1C 5 1 2022       No results for input(s): POCGLU in the last 72 hours  Blood Sugar Average: Last 72 hrs:     - A1GDM protocol for POCT glucose  - Insulin gtt as needed    PEPE (generalized anxiety disorder)  Assessment & Plan  Home Zoloft ordered    *  (spontaneous vaginal delivery)  Assessment & Plan  Doing well  Tolerating PO well  Ambulates without issues  Rh positive  A1GDM  Adequate bowel and bladder function  Pain is well controlled  Breastfeeding difficulties but working with lactation consultant  Contraception plan: Vasectomy       Plan:  - Anticipate discharge PPD#2        Subjective/Objective   Chief Complaint:     Post delivery  Patient is doing well  Lochia WNL  Pain well controlled       Subjective:     Pain: yes, cramping, improved with meds  Tolerating PO: yes  Voiding: yes  Flatus: yes  Ambulating: yes  Chest pain: no  Shortness of breath: no  Leg pain: no  Lochia: minimal    Objective:     Vitals: /62 (BP Location: Left arm)   Pulse 58   Temp 97 7 °F (36 5 °C) (Oral)   Resp 16   Ht 5' 5" (1 651 m)   Wt 70 3 kg (155 lb)   LMP 2021   SpO2 98%   Breastfeeding Yes   BMI 25 79 kg/m²     I/O        0701   0700  0701   0700    Urine (mL/kg/hr) 1260 (0 7)     Blood 100     Total Output 1360     Net -1360           Unmeasured Urine Occurrence 1 x           Lab Results   Component Value Date    WBC 10 67 (H) 2022    HGB 12 9 2022    HCT 38 1 2022    MCV 96 2022     2022       Physical Exam:     Gen: AAOx3, NAD  CV: no acute distress  Lungs: no acute distress  Abd: Soft, non-tender, non-distended, no rebound or guarding  Uterine fundus firm and non-tender, 1 cm below the umbilicus    Ext: Non tender    Madison Jung MD  OBGYN PGY-2  8/27/2022  6:01 AM

## 2022-08-29 NOTE — UTILIZATION REVIEW
Inpatient Admission Authorization Request   Notification of Maternity/Delivery &  Birth Information for Admission   SERVICING FACILITY:   [address] [place] [address], [place] [address] [address] [facility]  Tax ID: [id number]  NPI: [id number]  Place of Service: Inpatient [address]  Place of Service Code: 24     ATTENDING PROVIDER:  Attending Name and NPI#: Porter Davis, [address]  Address: [address] [place] [address], [place] [address] [address] [facility]  Phone: [phone]     UTILIZATION REVIEW CONTACT:  [facility] [employer] Utilization   Network Utilization Review Department  Phone: [phone]  Fax [phone]  Email: [email or web address]     PHYSICIAN ADVISORY SERVICES:  FOR EEOT-XI-ATKD REVIEW - MEDICAL NECESSITY DENIAL  Phone: [phone]  Fax: [phone]  Email: [email or web address]     TYPE OF REQUEST:  Inpatient Status     ADMISSION INFORMATION:  ADMISSION DATE/TIME: 22  8:26 PM  PATIENT DIAGNOSIS CODE/DESCRIPTION:  Encounter for full-term uncomplicated delivery [Q35] The primary encounter diagnosis was  (spontaneous vaginal delivery)  Diagnoses of 39 weeks gestation of pregnancy and PEPE (generalized anxiety disorder) were also pertinent to this visit  1   (spontaneous vaginal delivery)    2  39 weeks gestation of pregnancy    3  PEPE (generalized anxiety disorder)      DISCHARGE DATE/TIME: 2022 11:54 AM   MOTHER AND  INFORMATION:  Mother: Patsy Boeck 1990   Delivering clinician: [facility]   OB History        3    Para   2    Term   2       0    AB   1    Living   2       SAB        IAB        Ectopic        Multiple   0    Live Births   2               [place] Name & MRN:   Information for the patient's :  Timothy Lopez Girl Johnnykaren Leon) [[id number]]     [place] Delivery Information:  Sex: female  Delivered 2022 10:37 AM by Vaginal, Spontaneous;  Gestational Age: 39w1d    [place] Measurements:  Weight: 7 lb 15 oz (3600 g); Height: 21"    APGAR 1 minute 5 minutes 10 minutes   Totals: 9 9       Birth Information: 27 y o  female MRN: [id number] Unit/Bed#: L&D 308-01 Estimated Date of Delivery: 22  Birthweight: No birth weight on file  Gestational Age: <None> Delivery Type: Vaginal, Spontaneous      IMPORTANT INFORMATION:  Please contact Justyna Mantilla directly with any questions or concerns regarding this request  Department voicemails are confidential     Send requests for admission clinical reviews, concurrent reviews, approvals, and administrative denials due to lack of clinical to fax [phone]

## 2022-09-01 NOTE — ED PROVIDER NOTES
History  Chief Complaint   Patient presents with    Vomiting     Vomiting for 12 hours approximately 8x, upper abdominal pain that goes to mid back, 30 weeks pregnant      33year-old  F currently at 30 weeks gestation presents complaining of nausea and vomitting  Patient reports slight last night she had some epigastric abdominal pain that seemed to radiate to her back  She describes a cramping sensation in her epigastric region that she reports preceded the vomiting of nonbloody nonbilious vomit of approximately 8 times  She states that the cramping and not follow any specific pattern and she states that they did not feel like contractions  She denies any pelvic pain, gush of fluid or vaginal bleeding  Denies any recent falls or trauma  Reports adequate fetal movement  Reports some urinary incontinence with vomiting and some more frequent urination but denies dysuria or hematuria  She denies any fevers, chills, chest pain shortness of breath or any other complaints or concerns at this time  She has not taken anything for her symptoms  Prior to Admission Medications   Prescriptions Last Dose Informant Patient Reported? Taking?    Prenatal Vit-Fe Fumarate-FA (PRENATAL VITAMINS PO)  Self Yes No   Sig: Take by mouth   docusate sodium (COLACE) 100 mg capsule  Self Yes No   Sig: Take 100 mg by mouth 2 (two) times a day as needed     sertraline (ZOLOFT) 25 mg tablet   No No   Sig: Take 1 tablet (25 mg total) by mouth daily With 50 mg   sertraline (Zoloft) 50 mg tablet   No No   Sig: Take 1 tablet (50 mg total) by mouth daily With 25 mg      Facility-Administered Medications: None       Past Medical History:   Diagnosis Date    Anemia     anemia of pregnancy    Anxiety     Complication of anesthesia     extreme nausea    Depression     Diabetes mellitus ([facility] [address] )     A2GDM    Shingles     Past    Vacuum-assisted vaginal delivery 2020    Varicella     had as child       Past Surgical History:   Procedure Laterality Date    APPENDECTOMY      DILATION AND EVACUATION      TONSILLECTOMY      WISDOM TOOTH EXTRACTION         Family History   Problem Relation Age of Onset    Depression Mother     Hypertension Mother     Heart attack Father         x5    No Known Problems Sister     Heart defect Brother         mitral valve cleft - septal defect - had sugery as child    Alcohol abuse Brother     Bipolar disorder Brother     Depression Brother     Pancreatitis Brother     Cancer Maternal Grandmother         Lung cancer    Lymphoma Maternal Grandmother     Lung cancer Maternal Grandmother     Stroke Maternal Grandmother     Diabetes Paternal Grandmother     Kidney failure Paternal Grandmother     Heart attack Paternal Grandmother     Heart attack Paternal Grandfather     Heart failure Paternal Grandfather     No Known Problems Brother     Breast cancer Neg Hx     Colon cancer Neg Hx     Ovarian cancer Neg Hx      I have reviewed and agree with the history as documented  E-Cigarette/Vaping    E-Cigarette Use Never User      E-Cigarette/Vaping Substances    Nicotine No     THC No     CBD No      Social History     Tobacco Use    Smoking status: Former Smoker     Packs/day: 0 25     Years: 8 00     Pack years: 2 00     Types: Cigarettes     Quit date: 2017     Years since quittin 4    Smokeless tobacco: Never Used   Vaping Use    Vaping Use: Never used   Substance Use Topics    Alcohol use: Not Currently     Alcohol/week: 0 0 standard drinks     Comment: socially prior to knowledge of pregnancy    Drug use: Never       Review of Systems   Constitutional: Negative for chills, fatigue and fever  HENT: Negative for ear pain and sore throat  Eyes: Negative for pain  Respiratory: Negative for cough, shortness of breath and wheezing  Cardiovascular: Negative for chest pain, palpitations and leg swelling     Gastrointestinal: Positive for abdominal pain, nausea and vomiting  Negative for constipation and diarrhea  Endocrine: Negative for polyuria  Genitourinary: Negative for dysuria and pelvic pain  Musculoskeletal: Negative for arthralgias, myalgias, neck pain and neck stiffness  Skin: Negative for rash  Neurological: Negative for dizziness, syncope, light-headedness and headaches  All other systems reviewed and are negative  Physical Exam  Physical Exam  Constitutional:       General: She is not in acute distress  Appearance: Normal appearance  She is well-developed  HENT:      Head: Normocephalic and atraumatic  Right Ear: External ear normal       Left Ear: External ear normal       Mouth/Throat:      Pharynx: No oropharyngeal exudate  Eyes:      Extraocular Movements: Extraocular movements intact  Cardiovascular:      Rate and Rhythm: Normal rate and regular rhythm  Heart sounds: Normal heart sounds  Pulmonary:      Effort: Pulmonary effort is normal       Breath sounds: Normal breath sounds  Abdominal:      General: Bowel sounds are normal       Palpations: Abdomen is soft  Tenderness: There is no abdominal tenderness  Comments: Gravid abdomen  Nontender  Bedside ultrasound demonstrates fetal heart tones of 141  Musculoskeletal:         General: Normal range of motion  Cervical back: Normal range of motion  Skin:     General: Skin is warm  Capillary Refill: Capillary refill takes less than 2 seconds  Neurological:      General: No focal deficit present  Mental Status: She is alert and oriented to person, place, and time     Psychiatric:         Mood and Affect: Mood normal          Vital Signs  ED Triage Vitals [06/20/22 0811]   Temperature Pulse Respirations Blood Pressure SpO2   98 °F (36 7 °C) (!) 112 20 114/68 98 %      Temp Source Heart Rate Source Patient Position - Orthostatic VS BP Location FiO2 (%)   Oral Monitor Sitting Right arm --      Pain Score       4           Vitals: 06/20/22 1030 06/20/22 1045 06/20/22 1215 06/20/22 1230   BP:       Pulse: 103 100 91 105   Patient Position - Orthostatic VS:             Visual Acuity      ED Medications  Medications   sodium chloride 0 9 % bolus 1,000 mL (0 mL Intravenous Stopped 6/20/22 1253)   dextrose 5 % bolus 250 mL (0 mL Intravenous Stopped 6/20/22 1119)   ondansetron (ZOFRAN) injection 4 mg (4 mg Intravenous Given 6/20/22 0932)   Famotidine (PF) (PEPCID) injection 20 mg (20 mg Intravenous Given 6/20/22 1119)   acetaminophen (TYLENOL) tablet 975 mg (975 mg Oral Given 6/20/22 1119)       Diagnostic Studies  Results Reviewed     Procedure Component Value Units Date/Time    hCG, quantitative [[id number]]  (Abnormal) Collected: 06/20/22 0914    Lab Status: Final result Specimen: Blood from Arm, Left Updated: 06/20/22 1015     HCG, Quant 42,092 mIU/mL     Narrative:       Expected Ranges:     Approximate               Approximate HCG  Gestation age          Concentration ( mIU/mL)  _____________          ______________________   Jl Labrum                      HCG values  0 2-1                       5-50  1-2                           2-3                         100-5000  3-4                         500-73247  4-5                         1000-79553  5-6                         [id number]  6-8                         [id number]  8-12                        47694-388279      CBC and differential [[id number]]  (Abnormal) Collected: 06/20/22 0901    Lab Status: Final result Specimen: Blood from Arm, Right Updated: 06/20/22 1002     WBC 13 43 Thousand/uL      RBC 4 07 Million/uL      Hemoglobin 13 3 g/dL      Hematocrit 40 6 %       fL      MCH 32 7 pg      MCHC 32 8 g/dL      RDW 13 3 %      MPV 10 1 fL      Platelets 886 Thousands/uL     Narrative: This is an appended report  These results have been appended to a previously verified report      Manual Differential(PHLEBS Do Not Order) [[id number]]  (Abnormal) Collected: 06/20/22 0901 Lab Status: Final result Specimen: Blood from Arm, Right Updated: 06/20/22 1002     Segmented % 97 %      Lymphocytes % 1 %      Monocytes % 2 %      Eosinophils, % 0 %      Basophils % 0 %      Absolute Neutrophils 13 03 Thousand/uL      Lymphocytes Absolute 0 13 Thousand/uL      Monocytes Absolute 0 27 Thousand/uL      Eosinophils Absolute 0 00 Thousand/uL      Basophils Absolute 0 00 Thousand/uL      Total Counted --     RBC Morphology Normal     Platelet Estimate Adequate    Comprehensive metabolic panel [[id number]]  (Abnormal) Collected: 06/20/22 0901    Lab Status: Final result Specimen: Blood from Arm, Right Updated: 06/20/22 0923     Sodium 135 mmol/L      Potassium 3 9 mmol/L      Chloride 101 mmol/L      CO2 21 mmol/L      ANION GAP 13 mmol/L      BUN 8 mg/dL      Creatinine 0 48 mg/dL      Glucose 94 mg/dL      Calcium 8 1 mg/dL      AST 19 U/L      ALT 13 U/L      Alkaline Phosphatase 64 U/L      Total Protein 6 6 g/dL      Albumin 3 7 g/dL      Total Bilirubin 0 59 mg/dL      eGFR 129 ml/min/1 73sq m     Narrative:      [place] guidelines for Chronic Kidney Disease (CKD):     Stage 1 with normal or high GFR (GFR > 90 mL/min/1 73 square meters)    Stage 2 Mild CKD (GFR = 60-89 mL/min/1 73 square meters)    Stage 3A Moderate CKD (GFR = 45-59 mL/min/1 73 square meters)    Stage 3B Moderate CKD (GFR = 30-44 mL/min/1 73 square meters)    Stage 4 Severe CKD (GFR = 15-29 mL/min/1 73 square meters)    Stage 5 End Stage CKD (GFR <15 mL/min/1 73 square meters)  Note: GFR calculation is accurate only with a steady state creatinine    UA w Reflex to Microscopic w Reflex to Culture [[id number]]  (Abnormal) Collected: 06/20/22 0851    Lab Status: Final result Specimen: Urine, Clean Catch Updated: 06/20/22 0916     Color, UA Yellow     Clarity, UA Clear     Specific Gravity, UA 1 025     pH, UA 6 0     Leukocytes, UA Negative     Nitrite, UA Negative     Protein, UA Negative mg/dl Glucose, UA Negative mg/dl      Ketones, UA 80 (3+) mg/dl      Urobilinogen, UA 0 2 E U /dl      Bilirubin, UA Negative     Blood, UA Negative     URINE COMMENT --    Urine culture [[id number]] Collected: 06/20/22 0851    Lab Status: In process Specimen: Urine, Clean Catch Updated: 06/20/22 0916    Fingerstick Glucose (POCT) [[id number]]  (Normal) Collected: 06/20/22 0848    Lab Status: Final result Updated: 06/20/22 0850     POC Glucose 99 mg/dl                  No orders to display              Procedures  Procedures         ED Course  ED Course as of 06/20/22 1025 [facility] Jun 20, 2022 2018 141 fetal heart tones   0943 Discussed with Priscila Modi  States Dr Shanell Clarke is on call  Patient still needs 3 hr glucose  1009 Upon re-evaluation of the patient, she is feeling better  Offered patient admission to OB capable facility if she was still having abdominal pain  Informed her that pre term labor cannot be fully excluded given that she mentioned a cramping like sensation  Patient states she is not believe the cramping sensation were uterine contractions but rather her stomach preparing herself to vomit  Gave patient some p o  fluids and crackers to see if she tolerates p o  Plan to hydrate and reassess  1242 Upon re-evaluation of the patient, she is feeling much better  Has not had an episode of vomiting since early this morning  Was able to tolerate p o  fluids and crackers  Did reports some mild abdominal discomfort after having crackers initially but after Tylenol and Pepcid symptoms resolved and she was able to tolerate p o  fluids and some food without any pain  Patient does note a history of GERD that she thinks may be contributing to her symptoms  I once again offered patient admission for observation however she politely declined and is adamant that she prefers to go home and will return if her symptoms change or worsen    Recommend she call her OB office today to inform them and arrange prompt outpatient follow up  SBIRT 20yo+    Flowsheet Row Most Recent Value   SBIRT (25 yo +)    In order to provide better care to our patients, we are screening all of our patients for alcohol and drug use  Would it be okay to ask you these screening questions? Yes Filed at: 2022 112   Initial Alcohol Screen: US AUDIT-C     1  How often do you have a drink containing alcohol? 0 Filed at: 2022 1121   2  How many drinks containing alcohol do you have on a typical day you are drinking? 0 Filed at: 2022 1121   3b  FEMALE Any Age, or MALE 65+: How often do you have 4 or more drinks on one occassion? 0 Filed at: 2022 112   Audit-C Score 0 Filed at: 2022 112   RICKY: How many times in the past year have you    Used an illegal drug or used a prescription medication for non-medical reasons? Never Filed at: 2022 112                    MDM  Number of Diagnoses or Management Options  Nausea and vomiting in pregnancy  Diagnosis management comments: Denies vaginal bleeding  Denies gush of water from vagina  Denies vaginal discharge  Denies UTI symptoms  Denies decrease movement that would require observation  Denies fevers/chills/foul smelling discharge to suggest chorioamnionitis, septic   Denies pallor, lightheadedness, syncope to suggest blood loss  No severe RUQ pain, shortness of breath, easy bruising, RUQ pain, new onset hypertension, decreased urine output, severe headache or visual disturbance to suggest preeclampsia, HELLP or eclampsia  No trauma that would require further fetal monitoring  No painful vaginal bleeding, pelvis/uterine pain, hypotension, severe back pain, uterine contractions, to suggest placental abruption  No vaginal bleeding, shock, uterine defect, abdominal pain/peritoneal signs, loss of fetal station or fetal bradycardia to suggest placental rupture      Patient presented with nausea and vomiting in pregnancy  Discussed case with patient's nurse midwife  Details found in the ED course  Offered patient admission for observation which she politely declined  Patient's symptoms significantly improved with IV fluids and Zofran  Return precautions were advised, all questions were answered and patient was agreeable to plan  Disposition  Final diagnoses:   Nausea and vomiting in pregnancy     Time reflects when diagnosis was documented in both MDM as applicable and the Disposition within this note     Time User Action Codes Description Comment    6/20/2022 12:48 PM Beti Van Add [O21 9] Nausea and vomiting in pregnancy       ED Disposition     ED Disposition   Discharge    Condition   Stable    Date/Time   Mon Jun 20, 2022 12:47 PM    Comment   Junekatieray Jose Luis discharge to home/self care  Follow-up Information     Follow up With Specialties Details Why Contact Info    Kalli Latif CNM Midwifery, Obstetrics and Gynecology Schedule an appointment as soon as possible for a visit   [address]  [address] [facility]  [address]  [place] [address]            Patient's Medications   Discharge Prescriptions    ONDANSETRON (ZOFRAN ODT) 4 MG DISINTEGRATING TABLET    Take 1 tablet (4 mg total) by mouth every 6 (six) hours as needed for nausea or vomiting       Start Date: 6/20/2022 End Date: --       Order Dose: 4 mg       Quantity: 20 tablet    Refills: 0       No discharge procedures on file      PDMP Review       Value Time User    PDMP Reviewed  Yes 10/27/2020 12:39 PM Cuauhtemoc Gramajo PA-C          ED Provider  Electronically Signed by           Chandu Villanueva PA-C  06/20/22 1777 Winle[facility] Counseling:  I discussed with the patient the risks of topical clascoterone including but not limited to erythema, scaling, itching, and stinging. Patient voiced their understanding.

## 2022-09-02 LAB — PLACENTA IN STORAGE: NORMAL

## 2022-09-21 ENCOUNTER — TELEMEDICINE (OUTPATIENT)
Dept: PSYCHIATRY | Facility: CLINIC | Age: 32
End: 2022-09-21
Payer: COMMERCIAL

## 2022-09-21 DIAGNOSIS — F41.1 GAD (GENERALIZED ANXIETY DISORDER): ICD-10-CM

## 2022-09-21 DIAGNOSIS — F33.0 MILD EPISODE OF RECURRENT MAJOR DEPRESSIVE DISORDER (HCC): Primary | ICD-10-CM

## 2022-09-21 PROCEDURE — 99213 OFFICE O/P EST LOW 20 MIN: CPT | Performed by: PHYSICIAN ASSISTANT

## 2022-09-21 RX ORDER — SERTRALINE HYDROCHLORIDE 25 MG/1
25 TABLET, FILM COATED ORAL DAILY
Qty: 90 TABLET | Refills: 0 | Status: SHIPPED | OUTPATIENT
Start: 2022-09-21 | End: 2022-10-05 | Stop reason: DRUGHIGH
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
## 2022-09-26 NOTE — BH TREATMENT PLAN
TREATMENT PLAN (Medication Management Only)        [facility]    Name and Date of Birth:  Leanne Roa 27 y o  1990  Date of Treatment Plan: September 26, 2022  Diagnosis/Diagnoses:    1  Mild episode of recurrent major depressive disorder ([address] )    2  PEPE (generalized anxiety disorder)      Strengths/Personal Resources for Self-Care: taking medications as prescribed, ability to adapt to life changes, family ties, good physical health, motivation for treatment, self-reliance  Area/Areas of need (in own words): anxiety, depression  1  Long Term Goal: improve control of depression and anxiety  Target Date:3 months - 12/26/2022  Person/Persons responsible for completion of goal: Toribio Fried  2  Short Term Objective (s) - How will we reach this goal?:   A  Provider new recommended medication/dosage changes and/or continue medication(s): continue current medications as prescribed  B  Attend medication management appointments regularly  C  Take psychiatric medications responsibly  Target Date:3 months - 12/26/2022  Person/Persons Responsible for Completion of Goal: Archana/Federica/MD  Progress Towards Goals: continuing treatment  Treatment Modality: medication management every 4 weeks  Review due 180 days from date of this plan: 4 months - 1/26/2023  Expected length of service: ongoing treatment  My Physician/PA/NP and I have developed this plan together and I agree to work on the goals and objectives  I understand the treatment goals that were developed for my treatment    Treatment Plan done but not signed at time of office visit due to:  Plan reviewed by phone or in person  and verbal consent given due to COVID social distancing  Signed: Vamsi Harvey, [facility] [address] pad not working

## 2022-10-03 ENCOUNTER — POSTPARTUM VISIT (OUTPATIENT)
Dept: OBGYN CLINIC | Facility: CLINIC | Age: 32
End: 2022-10-03
Payer: COMMERCIAL

## 2022-10-03 VITALS
SYSTOLIC BLOOD PRESSURE: 100 MMHG | DIASTOLIC BLOOD PRESSURE: 64 MMHG | BODY MASS INDEX: 21.96 KG/M2 | WEIGHT: 131.8 LBS | HEIGHT: 65 IN

## 2022-10-03 DIAGNOSIS — O24.410 DIET CONTROLLED GESTATIONAL DIABETES MELLITUS (GDM) IN THIRD TRIMESTER: ICD-10-CM

## 2022-10-03 DIAGNOSIS — F33.0 MILD EPISODE OF RECURRENT MAJOR DEPRESSIVE DISORDER (HCC): ICD-10-CM

## 2022-10-03 NOTE — ASSESSMENT & PLAN NOTE
- Normal postpartum exam  - Contraception: Condoms as a bridge to partner vasectomy  - Depression Screen: 2- on Zoloft, follows with psychiatry  - Feeding: breastfeeding  - Psychosocial support: reports adequate support  - Patient Education: Postpartum resources including Pelvic Health PT and Baby & Me  - Cervical cancer screening: Cannot find in the system so I recommend collecting Pap with her next annual exam

## 2022-10-03 NOTE — PROGRESS NOTES
[facility] [place], [place]    Assessment/Plan:  Asim Rehman is a 27 y o  [id number] who presents for postpartum visit  Postpartum care and examination  - Normal postpartum exam  - Contraception: Condoms as a bridge to partner vasectomy  - Depression Screen: 2- on Zoloft, follows with psychiatry  - Feeding: breastfeeding  - Psychosocial support: reports adequate support  - Patient Education: Postpartum resources including Pelvic Health PT and Baby & Me  - Cervical cancer screening: Cannot find in the system so I recommend collecting Pap with her next annual exam      Diagnoses and all orders for this visit:    Postpartum care and examination    Diet controlled gestational diabetes mellitus (GDM) in third trimester  -     Glucose JENNI 2HR 75GM Nonpreg; Future    Mild episode of recurrent major depressive disorder [facility])          Subjective:   Asim Rehman is a 27 y o  [id number] female  CC: Postpartum    HPI: Yasmine Patrick presents for postpartum visit after uncomplicated  on   ROS: Review of Systems   Constitutional: Negative for chills and fever  Respiratory: Negative for cough and shortness of breath  Cardiovascular: Negative for chest pain and leg swelling  Gastrointestinal: Negative for abdominal pain, nausea and vomiting  Genitourinary: Negative for dysuria, frequency and urgency  Neurological: Negative for dizziness, light-headedness and headaches  PFSH: The following portions of the patient's history were reviewed and updated as appropriate: allergies, current medications, past family history, past medical history, obstetric history, gynecologic history, past social history, past surgical history and problem list        Objective:  /64   Ht 5' 5" (1 651 m)   Wt 59 8 kg (131 lb 12 8 oz)   LMP 2021   BMI 21 93 kg/m²    Physical Exam  Constitutional:       Appearance: Normal appearance     Cardiovascular:      Rate and Rhythm: Normal rate  Pulmonary:      Effort: Pulmonary effort is normal    Neurological:      Mental Status: She is alert     Psychiatric:         Mood and Affect: Mood normal          Behavior: Behavior normal            Devin Díaz MD  [facility]  [address]  10/3/2022 11:36 AM

## 2022-10-05 ENCOUNTER — TELEMEDICINE (OUTPATIENT)
Dept: PSYCHIATRY | Facility: CLINIC | Age: 32
End: 2022-10-05
Payer: COMMERCIAL

## 2022-10-05 DIAGNOSIS — F33.0 MILD EPISODE OF RECURRENT MAJOR DEPRESSIVE DISORDER (HCC): Primary | ICD-10-CM

## 2022-10-05 DIAGNOSIS — F41.1 GAD (GENERALIZED ANXIETY DISORDER): ICD-10-CM

## 2022-10-05 PROCEDURE — 99213 OFFICE O/P EST LOW 20 MIN: CPT | Performed by: PHYSICIAN ASSISTANT

## 2022-10-05 RX ORDER — SERTRALINE HYDROCHLORIDE 100 MG/1
100 TABLET, FILM COATED ORAL DAILY
Qty: 90 TABLET | Refills: 0 | Status: SHIPPED | OUTPATIENT
Start: 2022-10-05
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
## 2022-11-01 ENCOUNTER — OFFICE VISIT (OUTPATIENT)
Dept: POSTPARTUM | Facility: CLINIC | Age: 32
End: 2022-11-01

## 2022-11-01 NOTE — PROGRESS NOTES
INITIAL BREAST FEEDING EVALUATION    Informant/Relationship: Archana/mom    Discussion of General Lactation Issues: Sarabjit Schulte had a frenotomy done at her one month visit  Since then nursing has been going better and Loni Valdes no longer has pain  Sarabjit Schulte is still making a lot of noises  Some nursing sessions go very well, but sometimes Sarabjit Schulte is on and off the breast until Archana's nipple becomes sore  Loni Valdes says she has an over supply and she feels that Sarabjit Schulte is drowning in the milk  Infant is 3months 3week old today          History:  Fertility Problem:no  Breast changes:yes - bigger, tender/sore  : yes - no complications  Full term:yes - 44    labor:no  First nursing/attempt < 1 hour after birth:yes - immediately  Skin to skin following delivery:yes - immediately  Breast changes after delivery:yes - about 3-4 days; saw milk, felt molina, heard swallowing  Rooming in (infant in room with mother with exception of procedures, eg  Circumcision: yes - stayed the entire time except for testing  Blood sugar issues:yes - secondary to maternal GDM  NICU stay:no  Jaundice:no  Phototherapy:no  Supplement given: (list supplement and method used as well as reason(s):no    Past Medical History:   Diagnosis Date   • Anemia     anemia of pregnancy   • Anxiety    • Complication of anesthesia     extreme nausea   • Depression    • Diabetes mellitus ([facility] [address] )     A2GDM   • Shingles     Past   • Vacuum-assisted vaginal delivery 2020   • Varicella     had as child         Current Outpatient Medications:   •  acetaminophen (TYLENOL) 325 mg tablet, Take 2 tablets (650 mg total) by mouth every 4 (four) hours as needed for mild pain, Disp: , Rfl: 0  •  benzocaine-menthol-lanolin-aloe (DERMOPLAST) 20-0 5 % topical spray, Apply 1 application topically every 6 (six) hours as needed for irritation, Disp: , Rfl: 0  •  Blood Glucose Monitoring Suppl (Contour Next Monitor) w/Device KIT, , Disp: , Rfl:   •  docusate sodium (COLACE) 100 mg capsule, Take 100 mg by mouth 2 (two) times a day as needed  , Disp: , Rfl:   •  hydrocortisone 1 % cream, Apply 1 application topically daily as needed (hemorrhoids), Disp: 30 g, Rfl: 0  •  ibuprofen (MOTRIN) 600 mg tablet, Take 1 tablet (600 mg total) by mouth every 6 (six) hours, Disp: 50 tablet, Rfl: 1  •  Microlet Lancets MISC, Test x4 a day or as instructed, Disp: 100 each, Rfl: 3  •  Prenatal Vit-Fe Fumarate-FA (PRENATAL VITAMINS PO), Take by mouth, Disp: , Rfl:   •  senna-docusate sodium (SENOKOT-S) 8 6-50 mg per tablet, Take 1 tablet by mouth every other day, Disp: , Rfl:   •  sertraline (ZOLOFT) 100 mg tablet, Take 1 tablet (100 mg total) by mouth daily, Disp: 90 tablet, Rfl: 0  •  witch hazel-glycerin (TUCKS) topical pad, Apply 1 pad topically every 4 (four) hours as needed for irritation, Disp: , Rfl: 0    Allergies   Allergen Reactions   • Hydrocodone-Acetaminophen Other (See Comments) and GI Intolerance     4 hours after wisdom tooth extraction, patient took pain pill and started spinning, vomiting, and sweating - unsure if related to pain medication or anesthesia        Social History     Substance and Sexual Activity   Drug Use Never       Social History     Interval Breastfeeding History:    Frequency of breast feedin-4 hours, longer overnight  Does mother feel breastfeeding is effective: Yes  Does infant appear satisfied after nursing:If no, explain: often seems hungry again in 15 minutes  Stooling pattern normal: Yes  Urinating frequently:Yes  Using shield or shells: No    Alternative/Artificial Feedings:   Bottle: Yes, a couple  Cup: No  Syringe/Finger: No           Formula Type: n/a                     Amount: n/a            Breast Milk:                      Amount: 1 oz at a time            Frequency Q 2-4 Hr between feedings  Elimination Problems: No      Equipment:  Nipple Shield             Type: n/a             Size: n/a             Frequency of Use: n/a  Pump            Type: Spectra            Frequency of Use: not using it typically  Shells            Type: n/a            Frequency of use: n/a    Equipment Problems: no    Mom:  Breast: Normal  Nipple Assessment in General: Normal: elongated/eraser, no discoloration and no damage noted  Mother's Awareness of Feeding Cues                 Recognizes: Yes                  Verbalizes: Yes  Support System: FOB  History of Breastfeeding:  older child for about 2 5 years after a frenotomy  Changes/Stressors/Violence: S/p frenotomy, but baby still clicks, struggles with flow, and has reflux and gas  Concerns/Goals: Tony Fuller wishes to breast feed long term and decrease the breastfeeding related problems    Problems with Mom: none    Physical Exam  Constitutional:       Appearance: She is well-developed and normal weight  HENT:      Head: Normocephalic and atraumatic  Eyes:      Extraocular Movements: Extraocular movements intact  Neck:      Thyroid: No thyromegaly  Cardiovascular:      Rate and Rhythm: Normal rate and regular rhythm  Pulses: Normal pulses  Heart sounds: Normal heart sounds  No murmur heard  Pulmonary:      Effort: Pulmonary effort is normal       Breath sounds: Normal breath sounds  Musculoskeletal:      Cervical back: Normal range of motion and neck supple  Lymphadenopathy:      Cervical: No cervical adenopathy  Upper Body:      Right upper body: No pectoral adenopathy  Left upper body: No pectoral adenopathy  Neurological:      General: No focal deficit present  Mental Status: She is alert and oriented to person, place, and time  Psychiatric:         Mood and Affect: Mood normal          Behavior: Behavior normal          Thought Content: Thought content normal          Judgment: Judgment normal    Vitals and nursing note reviewed           Infant:  Behaviors: Alert  Color: Healthy  Birth weight: 3 6 kg  Current weight: 5 865 kg    Problems with infant: restricted tongue tie; preference to keep head to the left      General Appearance:  Alert, active, no distress                            Head:  Normocephalic, AFOF, sutures opposed                            Eyes:   Conjunctiva clear, no drainage                            Ears:   Normally placed, no anomolies                           Nose:   Septum intact, no drainage or erythema                          Mouth:  No lesions; tongue extends just over the lower lip; lateralization is limited to the tip of the tongue; frenulum is felt as a "speed bump" with finger sweep and is short, thick, and restricts passive lift of the tongue; white preston of previous incision is visible just above the salivary glands; palate is slightly arched; tongue pushes examiner's finger out of the mouth with no attempt to suck; baby gags with minimal pressure mid tongue                   Neck:  Supple, symmetrical, trachea midline, no adenopathy; thyroid: no enlargement, symmetric, no tenderness/mass/nodules; clear preference to turn head to the left with full passive ability to rotate side to side; there is decreased passive flexion to the right                Respiratory:  No grunting, flaring ,retractions, breath sounds clear and equal           Cardiovascular:  Regular rate and rhythm  No murmur  Adequate perfusion/capillary refill   Femoral pulse present                  Abdomen:    Soft, non-tender, no masses, bowel sounds present, no HSM            Genitourinary:  Normal female genitalia, anus patent                         Spine:   No abnormalities noted       Musculoskeletal:   Full range of motion         Skin/Hair/Nails:   Skin warm, dry, and intact, no rashes or abnormal dyspigmentation or lesions               Neurologic:   No abnormal movement, tone appropriate for gestational age    [place] Latch:  Efficiency:               Lips Flanged: Yes, wider after frenotomy              Depth of latch: Good, following frenotomy              Audible Swallow: Yes, after frenotomy              Visible Milk: Yes, after frenotomy              Wide Open/ Asymmetrical: Yes, after frenotomy              Suck Swallow Cycle: Breathing: Unlabored, Coordinated: Yes  Nipple Assessment after latch: Normal: elongated/eraser, no discoloration and no damage noted  Latch Problems: Prior to the frenotomy, Chelsey's latch is narrow and encompasses just the most distal areola and nipple  Archana manually flanges her upper lip  Danica Stone is on and off the nipple, clamping frequently to address aggressive letdown and squirting breast milk  Following the frenotomy, Danica Stone opens wider and can achieve a more asymmetrical and deeper latch with better control and sustained SSB  She nurses until content  Position:  Infant's Ergonomics/Body               Body Alignment: Yes               Head Supported: Yes               Close to Mom's body/ Lifted/ Supported: Yes               Mom's Ergonomics/Body: Yes                           Supported: Yes                           Sitting Back: Yes                           Brings Baby to her breast: Yes  Positioning Problems: None        Education:  Reviewed Latch: Reviewed how to gently compress the breast as if offering a sandwich to facilitate a deeper latch  Reviewed Positioning for Dyad: Reviewed how to bring baby to the breast so that her lower lip and chin touch the breast with her nose just above the nipple to encourage a wider, more asymmetric latch  Reviewed Frequency/Supply & Demand: Recommended feeding on demand: when the baby gives hunger cues, when the breasts feel full, every 3 hours during the day and every 5 hours at night counting from the beginning of one feeding to the beginning of the next; whichever comes first    Reviewed Alternative/Artificial Feedings: paced bottle feeding        Plan:  Discussed history and physical exams with Archana  Support given for her commitment to providing breast milk for her baby   Discussed the findings on the baby's exam consistent with tongue tie and reviewed how this may be the cause of nipple trauma, nipple pain, nipple damage, poor milk transfer, blocked ducts, mastitis, and loss of milk production  Discussed the science that supports performing the frenotomy to improve latch  I have spent 40 minutes with Family today in which greater than 50% of this time was spent in counseling/coordination of care regarding Prognosis, Risks and benefits of tx options, Intructions for management, Patient and family education and Impressions

## 2022-11-10 ENCOUNTER — OFFICE VISIT (OUTPATIENT)
Dept: POSTPARTUM | Facility: CLINIC | Age: 32
End: 2022-11-10

## 2022-11-10 DIAGNOSIS — R52 PAIN AGGRAVATED BY BREAST FEEDING: ICD-10-CM

## 2022-11-10 DIAGNOSIS — Z71.89 ENCOUNTER FOR BREAST FEEDING COUNSELING: Primary | ICD-10-CM

## 2022-11-10 DIAGNOSIS — O92.79 PAIN AGGRAVATED BY BREAST FEEDING: ICD-10-CM

## 2022-11-10 NOTE — PATIENT INSTRUCTIONS
Continue to feed Justin reyes on demand  Try different positions, use supportive pillows as needed to improve Chelsey's comfort while feeding at the breast   If she continues to refuse to latch or keeps popping off of the breast and getting fussy, try taking a "time out" and trying again later  It may be that she is not actually hungry  Her feeding cues will change over time  After trying the above suggestions, if you feel that your milk production is too high (and flow to fast) for Justin reyes, try block feeding for 48 hours  Feed on one breast for each feeding during a 3 hour period and then switch to the other breast for the next block  Do not express milk from the other breast during this time  This may help regulate production to a more comfortable level for Justin reyes, give her more complete feedings and lead to less frequent feedings at times  Tummy Time is an important activity for your baby  This can help resolve structural issues that may be causing breastfeeding challenges  I suggest several brief periods of tummy time every day for your   "Five Essential Tummy Time Moves,How To Do Tummy Time" by [employer]  org and "Tummy Time For Newborns" by [employer], are two helpful videos which can be found on YouTube to help you get started  Please call with any questions or concerns

## 2022-11-10 NOTE — PROGRESS NOTES
BREAST FEEDING FOLLOW UP VISIT    Informant/Relationship: Archana    Discussion of General Lactation Issues: Robin Graham feels that things have gotten worse since Chelsey's frenotomy  She is pulling off the breast more frequently and gagging more frequently while feeding  More feedings are challenging  Archana's nipples are sore  There are some feedings that go well without any challenges  Robin Graham has noticed that when feeding in side lying position, feedings are much better  Infant is 2 months old today  Interval Breastfeeding History:    Frequency of breast feeding: Every 1-2 hours during the day and every 2 hours at night  Does mother feel breastfeeding is effective: No  Does infant appear satisfied after nursing:Yes, but only for short intervals  Stooling pattern normal:Yes  Urinating frequently:Yes  Using shield or shells:No    Alternative/Artificial Feedings:   Bottle: Yes, but not recently  Cup: No  Syringe/Finger: No           Formula Type: n/a                     Amount: n/a            Breast Milk:                      Amount: 1 oz at a time            Frequency Q 1-2 Hr between feedings  Elimination Problems: No        Equipment:  Nipple Shield             Type: n/a             Size: n/a             Frequency of Use: n/a  Pump            Type: Spectra            Frequency of Use: not using it typically  Shells            Type: n/a            Frequency of use: n/a     Equipment Problems: no     Mom:  Breast: Small symmetrical breasts  Rounded shape  Closely spaced  Nipple Assessment in General: Normal: elongated/eraser, no discoloration and no damage noted  Mother's Awareness of Feeding Cues                 Recognizes: Yes                  Verbalizes: Yes  Support System: FOB  History of Breastfeeding:  older child for about 2 5 years after a frenotomy   Breastfeeding was very challenging for the first 6 months until Helena Stock "figured it out"  Changes/Stressors/Violence: S/p frenotomy, but Femi Cameron is popping of the breast frequently and feeding very frequently most of the time  Archana's partner is currently away from home and Hawa Guillen is caring for her children on her own  Concerns/Goals: Hawa Guillen wishes to breast feed long term and decrease the breastfeeding related problems     Problems with Mom: stress      Physical Exam  Constitutional:       Appearance: Normal appearance  Pulmonary:      Effort: Pulmonary effort is normal    Musculoskeletal:         General: Normal range of motion  Cervical back: Normal range of motion and neck supple  Neurological:      Mental Status: She is alert and oriented to person, place, and time  Skin:     General: Skin is warm and dry  Psychiatric:         Mood and Affect: Mood normal          Behavior: Behavior normal          Thought Content: Thought content normal          Judgment: Judgment normal          Infant:  Behaviors: Alert  Color: Pink  Birth weight: 3600gram  Current weight: 6165gram    Problems with infant: tongue tie s/p frenotomy x2, torticollis      General Appearance:  Alert, active, no distress                            Head:  Normocephalic, AFOF, sutures opposed                            Eyes:   Conjunctiva clear, no drainage                            Ears:   Normally placed, no anomolies                           Nose:   no drainage or erythema                          Mouth:  No lesions  Recessed chin  Tongue extends beyond the lower lip, lateralizes well and tip elevates  Cherylin Battles would not suck on my finger during today's exam                    Neck:  Slight head tilt to the left side, symmetrical, trachea midline                Respiratory:  No grunting, flaring, retractions, breath sounds clear and equal           Cardiovascular:  Regular rate and rhythm  No murmur  Adequate perfusion/capillary refill   Femoral pulse present                  Abdomen:    Soft, non-tender, no masses, bowel sounds present, no HSM            Genitourinary:  Normal female genitalia, anus patent                         Spine:   No abnormalities noted       Musculoskeletal:   Full range of motion  Appears uncomfortable when positioned on her left side and lifts her head off of the table  Skin/Hair/Nails:   Skin warm, dry, and intact, skin is dry and rough and irritated appearing head to toe               Neurologic:   No abnormal movement, slightly increased tone  [place] Latch:  Efficiency:               Lips Flanged: upper lip curls under, lower lip flanges              Depth of latch: fair              Audible Swallow: Yes              Visible Milk: Yes              Wide Open/ Asymmetrical: No              Suck Swallow Cycle: Breathing: unlabored, Coordinated: yes  Nipple Assessment after latch: not assessed  Latch Problems: At the first attempt, Olu Sukhdeep was very fussy and would latch and almost immediately unlatch  She turned her head down and away from the breast (left breast)  We took a time out and after the visit, after I left the room, Olu Filter latched and fed well on the right breast   Alexa Mcclelland reported the feeding was mostly comfortable  She stood and swayed with Olu Filter to keep her latched and feeding  This is a technique that she has found works at home  Position:  Infant's Ergonomics/Body               Body Alignment: Yes               Head Supported: Yes               Close to Mom's body/ Lifted/ Supported: Yes               Mom's Ergonomics/Body: Yes                           Supported: Yes, after education                           Sitting Back: Yes, after education                           Brings Baby to her breast: Yes, after education  Positioning Problems: Initially, Cashevaristo Stas positioned Olu Filter at the left breast in cross cradle hold  Olu Filter was on her back and Cashevaristo Mcclelland leaned over her to bring her nipple to Chelsey's mouth        Handouts:   None    Education:  Reviewed Latch: Discussed potential reasons that Olu Filter is still struggling to latch at times   Reviewed Positioning for Dyad: Demonstrated cradle, laid back and cross cradle using a supportive pillow to allow Bashir Rm some options for feedings at home as she works on improving Chelsey's comfort at the breast  Reviewed Mom/Breast care: Discussed with Bashir Rm the current recommendations for managing blocked ducts to prevent additional pain and inflammation in her breasts        Plan:   Reassurance and support given  I acknowledged Archana's feelings about her breastfeeding journey so far  I encouraged her to continue to feed Justin reyes on demand  We discussed that some of Chelsey's recent fussiness may be due to misread feeding cues  I recommended that if Justin reyes is very fussy when offered the breast and won't stay latched, that Bashir Rm take a "time out" and try again later  We also worked on a few different positions as Justin reyes did appear physically uncomfortable when positioned at the breast   I suggested some tummy time to help resolve Chelsey's mild structural issues and offered a PT referral if Bashir Rm desires ( she declined at this time)  I suggested that if the above interventions do not improve Danielle's ability to latch and feed effectively and comfortably over the next week or so, Bashir Rm could try some block feeding to see if hyperlactation is contributing to Chelsey's symptoms  However, I do not really feel this is the issue right now based on Chelsey's weight gain and Archana's reports that her breasts feel comfortable between feedings most of the time and she is not having the symptoms she experienced with her first child when she had over supply  I encouraged Bashir Rm to call for more support as desired  I have spent 60 minutes with Patient and family today in which greater than 50% of this time was spent in counseling/coordination of care regarding Patient and family education

## 2022-11-11 NOTE — PROGRESS NOTES
I have reviewed the notes, assessments, and/or procedures performed by Carol Marks RN, IBCLC, I concur with her/his documentation of Cong Galan MD 11/10/22

## 2022-11-28 ENCOUNTER — TELEPHONE (OUTPATIENT)
Dept: FAMILY MEDICINE CLINIC | Facility: CLINIC | Age: 32
End: 2022-11-28

## 2022-11-28 DIAGNOSIS — J01.90 ACUTE NON-RECURRENT SINUSITIS, UNSPECIFIED LOCATION: Primary | ICD-10-CM

## 2022-11-28 NOTE — TELEPHONE ENCOUNTER
I can send in antibiotic, please confirm allergies, also overdue for follow-up please schedule  Thanks! Thanks for visiting us today!    Remember these important phone numbers:    [phone] for phone nurses during the day and our nurse answering service at night    [phone]  for scheduling or changing future appointments    [phone] for the Poison Control Center    When leaving a message for our staff, please include:   • the spelling of your child’s full name and date of birth  • your full name and relationship to child  • best phone number and time to reach you   • reason for the call      Is your child signed up for [employer]? If you do this, you can message us rather than playing phone tag! You can also look at labs, pay your bills, and do some scheduling. Go to your own account first. (If you don't have one yet, you can set one up at the website below or at your doctor's office).  Using a web browser (not the phone kathleen), on the right hand side of your page, click the button marked \"Request Access to my Child's Records.\" Fill out the information. In a few days your child's information will be linked to your account. It's that simple!! Here is the website for more information:     [email or web address]      If you haven't already liked us on Facebook, please do so!  Just search for \"[facility]\"      --------------------------------------------------------------------------------------------------------------------

## 2022-11-28 NOTE — TELEPHONE ENCOUNTER
Patient called with c/o ear pain, congestion, headache behind eyes  Lightheaded/headache when bending down then getting up  Her children had RSV about 15 days ago, they have recovered but she feels she has a sinus infection still  Is unable to make an appt tomorrow  Uses [facility]  Patient is breast feeding         [phone]

## 2022-11-29 RX ORDER — AMOXICILLIN AND CLAVULANATE POTASSIUM 875; 125 MG/1; MG/1
1 TABLET, FILM COATED ORAL EVERY 12 HOURS SCHEDULED
Qty: 20 TABLET | Refills: 0 | Status: SHIPPED | OUTPATIENT
Start: 2022-11-29 | End: 2022-12-09

## 2023-01-16 ENCOUNTER — TELEPHONE (OUTPATIENT)
Dept: ADMINISTRATIVE | Facility: OTHER | Age: 33
End: 2023-01-16

## 2023-01-16 ENCOUNTER — OFFICE VISIT (OUTPATIENT)
Dept: FAMILY MEDICINE CLINIC | Facility: CLINIC | Age: 33
End: 2023-01-16

## 2023-01-16 VITALS
RESPIRATION RATE: 16 BRPM | DIASTOLIC BLOOD PRESSURE: 60 MMHG | HEIGHT: 65 IN | TEMPERATURE: 97.7 F | OXYGEN SATURATION: 99 % | BODY MASS INDEX: 21.13 KG/M2 | HEART RATE: 66 BPM | SYSTOLIC BLOOD PRESSURE: 104 MMHG | WEIGHT: 126.8 LBS

## 2023-01-16 DIAGNOSIS — H57.02 PUPIL ASYMMETRY: ICD-10-CM

## 2023-01-16 DIAGNOSIS — Z86.32 HISTORY OF GESTATIONAL DIABETES: ICD-10-CM

## 2023-01-16 DIAGNOSIS — E78.5 HYPERLIPIDEMIA, UNSPECIFIED HYPERLIPIDEMIA TYPE: ICD-10-CM

## 2023-01-16 DIAGNOSIS — R09.81 NASAL CONGESTION: ICD-10-CM

## 2023-01-16 DIAGNOSIS — F41.1 GAD (GENERALIZED ANXIETY DISORDER): ICD-10-CM

## 2023-01-16 DIAGNOSIS — K59.09 CHRONIC CONSTIPATION: ICD-10-CM

## 2023-01-16 DIAGNOSIS — Z00.00 ANNUAL PHYSICAL EXAM: Primary | ICD-10-CM

## 2023-01-16 DIAGNOSIS — J33.9 NASAL POLYP: ICD-10-CM

## 2023-01-16 DIAGNOSIS — H65.93 FLUID LEVEL BEHIND TYMPANIC MEMBRANE OF BOTH EARS: ICD-10-CM

## 2023-01-16 RX ORDER — LUBIPROSTONE 24 UG/1
24 CAPSULE, GELATIN COATED ORAL 2 TIMES DAILY WITH MEALS
Qty: 60 CAPSULE | Refills: 1 | Status: SHIPPED | OUTPATIENT
Start: 2023-01-16

## 2023-01-16 NOTE — TELEPHONE ENCOUNTER
----- Message from Anish Chawla sent at 1/13/2023  4:27 PM EST -----  Regarding: cervical cancer  01/13/23 4:27 PM    Hello, our patient Raciel Espinoza has had Pap Smear (HPV) aka Cervical Cancer Screening completed/performed  Please assist in updating the patient chart by pulling the Care Everywhere (CE) document  The date of service is 6/13/2019       Thank you,  Anish Oscar

## 2023-01-16 NOTE — PROGRESS NOTES
Assessment/Plan:       Problem List Items Addressed This Visit        Digestive    Chronic constipation     - Recommended consult with GI, she would like to try different medication in the meantime to see if this will help   - Trial Amitiza 24 mcg BID          Relevant Medications    lubiprostone (AMITIZA) 24 mcg capsule    Other Relevant Orders    CBC and differential    Comprehensive metabolic panel    TSH, 3rd generation with Free T4 reflex    Ambulatory Referral to Gastroenterology       Nervous and Auditory    Fluid level behind tympanic membrane of both ears    Relevant Orders    Ambulatory Referral to Otolaryngology       Other    PEPE (generalized anxiety disorder)    History of gestational diabetes    Relevant Orders    HEMOGLOBIN A1C W/ EAG ESTIMATION    Nasal congestion     - Recommended consult with ENT         Relevant Orders    Ambulatory Referral to Otolaryngology    Hyperlipidemia    Relevant Orders    Lipid Panel with Direct LDL reflex    Pupil asymmetry   Other Visit Diagnoses     Annual physical exam    -  Primary    Nasal polyp                Pupil asymmetry noted on exam, chronic per patient  Recommended opthalmology follow-up  Subjective:      Patient ID: Navya Tee is a 27 y o  female  HPI     Anxiety- Following with psychiatry, increased Zoloft helping, some days still more anxious, worse postpartum  No SI  Manageable right now        PHQ-2/9 Depression Screening    Little interest or pleasure in doing things: 0 - not at all  Feeling down, depressed, or hopeless: 0 - not at all  Trouble falling or staying asleep, or sleeping too much: 0 - not at all  Feeling tired or having little energy: 0 - not at all  Poor appetite or overeatin - not at all  Feeling bad about yourself - or that you are a failure or have let yourself or your family down: 0 - not at all  Trouble concentrating on things, such as reading the newspaper or watching television: 0 - not at all  Moving or speaking so slowly that other people could have noticed  Or the opposite - being so fidgety or restless that you have been moving around a lot more than usual: 0 - not at all  Thoughts that you would be better off dead, or of hurting yourself in some way: 0 - not at all  PHQ-9 Score: 0   PHQ-9 Interpretation: No or Minimal depression        Constipation- Using laxatives, getting worse, has to take multiple types  Senna, colace, smooth move tea, Miralax doesn't help  No enema use in a long time  Mom has same problem  Will never go without laxatives, sometimes will go 10 days without BM  Feels bloated/uncomfortable  Hydrates, fresh fruits/veggies, kombucha, fiber pills  Typically no blood in stool, blood on outside sometimes, with really straining  Known hemorrhoids, worse now after pregnancies  She noted occasional/intermittent ear pain  Sometimes nasal congestion  The following portions of the patient's history were reviewed and updated as appropriate: allergies, current medications, past family history, past medical history, past social history, past surgical history, and problem list     Review of Systems   All other systems reviewed and are negative  Objective:      /60   Pulse 66   Temp 97 7 °F (36 5 °C) (Tympanic)   Resp 16   Ht 5' 5" (1 651 m)   Wt 57 5 kg (126 lb 12 8 oz)   SpO2 99%   BMI 21 10 kg/m²          Physical Exam  Vitals reviewed  Constitutional:       General: She is not in acute distress  Appearance: Normal appearance  She is not ill-appearing, toxic-appearing or diaphoretic  HENT:      Head: Normocephalic and atraumatic  Right Ear: Ear canal and external ear normal  A middle ear effusion is present  There is no impacted cerumen  Left Ear: Ear canal and external ear normal  A middle ear effusion is present  There is no impacted cerumen  Nose: Congestion present  No rhinorrhea        Comments: Right nasal polyp   Eyes:      General:         Right eye: No discharge  Left eye: No discharge  Extraocular Movements: Extraocular movements intact  Conjunctiva/sclera: Conjunctivae normal       Pupils: Pupils are equal, round, and reactive to light  Comments: Pupil asymmetry noted    Cardiovascular:      Rate and Rhythm: Normal rate and regular rhythm  Heart sounds: Normal heart sounds  No murmur heard  No friction rub  No gallop  Pulmonary:      Effort: Pulmonary effort is normal  No respiratory distress  Breath sounds: Normal breath sounds  No stridor  No wheezing or rhonchi  Abdominal:      General: Bowel sounds are normal  There is no distension  Palpations: Abdomen is soft  There is no mass  Tenderness: There is abdominal tenderness  There is no guarding or rebound  Comments: Mild tenderness    Musculoskeletal:         General: No swelling, tenderness or signs of injury  Right lower leg: No edema  Left lower leg: No edema  Skin:     General: Skin is warm  Coloration: Skin is not pale  Findings: No erythema or rash  Neurological:      Mental Status: She is alert and oriented to person, place, and time  Motor: No weakness     Psychiatric:         Mood and Affect: Mood normal          Behavior: Behavior normal              DO Edmar Pino [address] [place] [address]

## 2023-01-16 NOTE — PROGRESS NOTES
[facility]ULT [facility]    NAME: Meena Logan  AGE: 27 y o  SEX: female  : 1990     DATE: 2023     Assessment and Plan:     Problem List Items Addressed This Visit        Digestive    Chronic constipation     - Recommended consult with GI, she would like to try different medication in the meantime to see if this will help   - Trial Amitiza 24 mcg BID          Relevant Medications    lubiprostone (AMITIZA) 24 mcg capsule    Other Relevant Orders    CBC and differential    Comprehensive metabolic panel    TSH, 3rd generation with Free T4 reflex    Ambulatory Referral to Gastroenterology       Nervous and Auditory    Fluid level behind tympanic membrane of both ears    Relevant Orders    Ambulatory Referral to Otolaryngology       Other    PEPE (generalized anxiety disorder)    History of gestational diabetes    Relevant Orders    HEMOGLOBIN A1C W/ EAG ESTIMATION    Nasal congestion     - Recommended consult with ENT         Relevant Orders    Ambulatory Referral to Otolaryngology    Hyperlipidemia    Relevant Orders    Lipid Panel with Direct LDL reflex    Pupil asymmetry   Other Visit Diagnoses     Annual physical exam    -  Primary    Nasal polyp              Immunizations and preventive care screenings were discussed with patient today  Appropriate education was printed on patient's after visit summary  Counseling:  Alcohol/drug use: discussed moderation in alcohol intake, the recommendations for healthy alcohol use, and avoidance of illicit drug use  Dental Health: discussed importance of regular tooth brushing, flossing, and dental visits  Injury prevention: discussed safety/seat belts, safety helmets, smoke detectors, carbon dioxide detectors, and smoking near bedding or upholstery    Sexual health: discussed sexually transmitted diseases, partner selection, use of condoms, avoidance of unintended pregnancy, and contraceptive alternatives  · Exercise: the importance of regular exercise/physical activity was discussed  Recommend exercise 3-5 times per week for at least 30 minutes  No follow-ups on file  Chief Complaint:     Chief Complaint   Patient presents with   • Annual Exam      History of Present Illness:     Adult Annual Physical   Patient here for a comprehensive physical exam  The patient reports - no problem-focused note  Diet and Physical Activity  · Diet/Nutrition: well balanced diet  · Exercise: no formal exercise  Depression Screening  PHQ-2/9 Depression Screening    Little interest or pleasure in doing things: 0 - not at all  Feeling down, depressed, or hopeless: 0 - not at all  Trouble falling or staying asleep, or sleeping too much: 0 - not at all  Feeling tired or having little energy: 0 - not at all  Poor appetite or overeatin - not at all  Feeling bad about yourself - or that you are a failure or have let yourself or your family down: 0 - not at all  Trouble concentrating on things, such as reading the newspaper or watching television: 0 - not at all  Moving or speaking so slowly that other people could have noticed  Or the opposite - being so fidgety or restless that you have been moving around a lot more than usual: 0 - not at all  Thoughts that you would be better off dead, or of hurting yourself in some way: 0 - not at all  PHQ-9 Score: 0   PHQ-9 Interpretation: No or Minimal depression        General Health  · Sleep: sleeps poorly  · Hearing: normal - bilateral   · Vision: most recent eye exam >1 year ago  · Dental: regular dental visits  /GYN Health  · GYN UTD     Review of Systems:     Review of Systems - no problem-focused note     Past Medical History:     Past Medical History:   Diagnosis Date   • Anemia     anemia of pregnancy   • Anxiety    • Complication of anesthesia     extreme nausea   • Depression    • Diabetes mellitus ([facility] [address] )     A2GDM   • Shingles Past   • Vacuum-assisted vaginal delivery 2020   • Varicella     had as child      Past Surgical History:     Past Surgical History:   Procedure Laterality Date   • APPENDECTOMY     • DILATION AND EVACUATION     • TONSILLECTOMY     • WISDOM TOOTH EXTRACTION        Social History:     Social History     Socioeconomic History   • Marital status: /Civil Union     Spouse name: None   • Number of children: None   • Years of education: None   • Highest education level: None   Occupational History   • None   Tobacco Use   • Smoking status: Former     Packs/day: 0 25     Years: 8 00     Pack years: 2 00     Types: Cigarettes     Quit date: 2017     Years since quittin 0   • Smokeless tobacco: Never   Vaping Use   • Vaping Use: Never used   Substance and Sexual Activity   • Alcohol use: Not Currently     Alcohol/week: 0 0 standard drinks     Comment: socially prior to knowledge of pregnancy   • Drug use: Never   • Sexual activity: Yes     Partners: Male     Birth control/protection: None   Other Topics Concern   • None   Social History Narrative   • None     Social Determinants of Health     Financial Resource Strain: Not on file   Food Insecurity: Not on file   Transportation Needs: Not on file   Physical Activity: Not on file   Stress: Not on file   Social Connections: Not on file   Intimate Partner Violence: Not on file   Housing Stability: Not on file      Family History:     Family History   Problem Relation Age of Onset   • Depression Mother    • Hypertension Mother    • Heart attack Father         x5   • No Known Problems Sister    • Heart defect Brother         mitral valve cleft - septal defect - had [facility]   • Alcohol abuse Brother    • Bipolar disorder Brother    • Depression Brother    • Pancreatitis Brother    • Cancer Maternal Grandmother         Lung cancer   • Lymphoma Maternal Grandmother    • Lung cancer Maternal Grandmother    • Stroke Maternal Grandmother    • Diabetes Paternal Grandmother    • Kidney failure Paternal Grandmother    • Heart attack Paternal Grandmother    • Heart attack Paternal Grandfather    • Heart failure Paternal Grandfather    • No Known Problems Brother    • Breast cancer Neg Hx    • Colon cancer Neg Hx    • Ovarian cancer Neg Hx       Current Medications:     Current Outpatient Medications   Medication Sig Dispense Refill   • lubiprostone (AMITIZA) 24 mcg capsule Take 1 capsule (24 mcg total) by mouth 2 (two) times a day with meals 60 capsule 1   • Prenatal Vit-Fe Fumarate-FA (PRENATAL VITAMINS PO) Take by mouth     • senna-docusate sodium (SENOKOT-S) 8 6-50 mg per tablet Take 1 tablet by mouth every other day     • sertraline (ZOLOFT) 100 mg tablet Take 1 tablet (100 mg total) by mouth daily 90 tablet 0   • Microlet Lancets MISC Test x4 a day or as instructed 100 each 3     No current facility-administered medications for this visit  Allergies: Allergies   Allergen Reactions   • Hydrocodone-Acetaminophen Other (See Comments) and GI Intolerance     4 hours after wisdom tooth extraction, patient took pain pill and started spinning, vomiting, and sweating - unsure if related to pain medication or anesthesia       Physical Exam:     /60   Pulse 66   Temp 97 7 °F (36 5 °C) (Tympanic)   Resp 16   Ht 5' 5" (1 651 m)   Wt 57 5 kg (126 lb 12 8 oz)   SpO2 99%   BMI 21 10 kg/m²     Physical Exam - no problem-focused note      [facility]

## 2023-01-16 NOTE — PATIENT INSTRUCTIONS

## 2023-01-16 NOTE — TELEPHONE ENCOUNTER
Upon review of the In Basket request we were able to locate, review, and update the patient chart as requested for Pap Smear (HPV) aka Cervical Cancer Screening  Any additional questions or concerns should be emailed to the Practice Liaisons via the appropriate education email address, please do not reply via In Basket      Thank you  Guadalupe Villanueva

## 2023-01-17 PROBLEM — E78.5 HYPERLIPIDEMIA: Status: ACTIVE | Noted: 2023-01-17

## 2023-01-17 PROBLEM — H57.02 PUPIL ASYMMETRY: Status: ACTIVE | Noted: 2023-01-17

## 2023-01-17 PROBLEM — K59.09 CHRONIC CONSTIPATION: Status: ACTIVE | Noted: 2023-01-17

## 2023-01-17 PROBLEM — R09.81 NASAL CONGESTION: Status: ACTIVE | Noted: 2023-01-17

## 2023-01-17 PROBLEM — H65.93 FLUID LEVEL BEHIND TYMPANIC MEMBRANE OF BOTH EARS: Status: ACTIVE | Noted: 2023-01-17

## 2023-01-17 NOTE — ASSESSMENT & PLAN NOTE
- Recommended consult with GI, she would like to try different medication in the meantime to see if this will help   - Trial Amitiza 24 mcg BID

## 2023-01-18 ENCOUNTER — TELEPHONE (OUTPATIENT)
Dept: FAMILY MEDICINE CLINIC | Facility: CLINIC | Age: 33
End: 2023-01-18

## 2023-01-18 NOTE — TELEPHONE ENCOUNTER
Spoke to patient  She no longer has [employer]  Called pharmacy  They ran RX through [facility]  Still needed a Prior Auth     ID: [id number]  PCN: [id number]  BIN: [id number]    Auth submitted on CoverMyMeds   Key: [place]

## 2023-01-18 NOTE — TELEPHONE ENCOUNTER
Received call from patient that Amitiza needed prior auth  Called pharmacy for prescription insurance information  Was told patient has [employer] and [employer] presciprtion plans  But [employer] was not covering med, therefore [employer] would not   [employer] information given by pharmacy -  BIN: [id number]  PCN: [id number]  GROUP: [id number]  ID: [id number]    Attempted to submit auth through CoverMyMeds  Received notification stating "The [employer] Prior Authorization Department is unable to review this request at this time  Our records indicate that this is not one of [employer]'s members "     Called and left patient a voicemail to call our office  Need to verify insurance information  Warm/Dry

## 2023-03-23 ENCOUNTER — CONSULT (OUTPATIENT)
Dept: GASTROENTEROLOGY | Facility: CLINIC | Age: 33
End: 2023-03-23

## 2023-03-23 VITALS
HEART RATE: 74 BPM | BODY MASS INDEX: 21.33 KG/M2 | WEIGHT: 128 LBS | OXYGEN SATURATION: 98 % | HEIGHT: 65 IN | RESPIRATION RATE: 18 BRPM | SYSTOLIC BLOOD PRESSURE: 100 MMHG | DIASTOLIC BLOOD PRESSURE: 60 MMHG

## 2023-03-23 DIAGNOSIS — K59.09 CHRONIC CONSTIPATION: ICD-10-CM

## 2023-03-23 DIAGNOSIS — Z83.71 FAMILY HISTORY OF COLONIC POLYPS: ICD-10-CM

## 2023-03-23 DIAGNOSIS — E55.9 VITAMIN D DEFICIENCY: Primary | ICD-10-CM

## 2023-03-23 PROBLEM — Z83.719 FAMILY HISTORY OF COLONIC POLYPS: Status: ACTIVE | Noted: 2023-03-23

## 2023-03-23 RX ORDER — LINACLOTIDE 145 UG/1
145 CAPSULE, GELATIN COATED ORAL DAILY
Qty: 16 CAPSULE | Refills: 3 | Status: SHIPPED | COMMUNITY
Start: 2023-03-23

## 2023-03-23 NOTE — ASSESSMENT & PLAN NOTE
Patient's vitamin D was a little bit low  She is not currently on vitamin D replacement    Check vitamin D level

## 2023-03-23 NOTE — ASSESSMENT & PLAN NOTE
Phyllis Wilson has been experiencing constipation for much of her life  She feels like her symptoms may be getting worse  She also feels like she will develop tolerance to various senna products although senna seems to be working at this point in time  At this time she is using a senna doscusate combination which seems to be helping her  She did try Amitiza 24 mcg twice a day that provided her with a bowel movement every 4 to 5 days but it was incomplete and she did have a side effect of nausea  MiraLAX was not helpful for her  Given her history of constipation and family history of colon polyps in both mom and dad it may be a good idea to pursue colonoscopy  I did suggest the use of a fiber supplement such as Benefiber 2 teaspoonfuls or Citrucel 1 heaping tablespoonful mixed in 6 to 8 ounces of noncarbonated liquid daily  We can try Linzess at 145 mcg dose daily  If she does not respond to that we could increase to 290 mcg daily  I did check and appears that this medication should be safe for breast-feeding  She will double check with her gynecologist   If colonoscopy unremarkable and continued nonresponse to laxatives, consider anorectal manometry with balloon expulsion  She will receive a Colyte preparation and split dose with the second dose completed 3 hours prior to arrival   3 bisacodyl tablets  I explained to the patient the procedure of colonoscopy as well as its potential risks which are approximately 3 in 1000 chance of bleeding, infection, and perforation  Perforation may require a surgeon to repair it  I also explained the small but significant chance of missing lesions with colonoscopy which has been reported to be about 5-10%      Check CBC  Check thyroid function  Check chemistry complete

## 2023-03-23 NOTE — PROGRESS NOTES
[facility]  Gastroenterology Outpatient Consultation  Patient Sony Frederick   Age 27 y o  Gender female   MRN: [id number]  [facility] [id number]     ASSESSMENT AND PLAN:   Problem List Items Addressed This Visit        Digestive    Chronic constipation     Tyson Jewell has been experiencing constipation for much of her life  She feels like her symptoms may be getting worse  She also feels like she will develop tolerance to various senna products although senna seems to be working at this point in time  At this time she is using a senna doscusate combination which seems to be helping her  She did try Amitiza 24 mcg twice a day that provided her with a bowel movement every 4 to 5 days but it was incomplete and she did have a side effect of nausea  MiraLAX was not helpful for her  Given her history of constipation and family history of colon polyps in both mom and dad it may be a good idea to pursue colonoscopy  I did suggest the use of a fiber supplement such as Benefiber 2 teaspoonfuls or Citrucel 1 heaping tablespoonful mixed in 6 to 8 ounces of noncarbonated liquid daily  We can try Linzess at 145 mcg dose daily  If she does not respond to that we could increase to 290 mcg daily  I did check and appears that this medication should be safe for breast-feeding  She will double check with her gynecologist   If colonoscopy unremarkable and continued nonresponse to laxatives, consider anorectal manometry with balloon expulsion  She will receive a Colyte preparation and split dose with the second dose completed 3 hours prior to arrival   3 bisacodyl tablets  I explained to the patient the procedure of colonoscopy as well as its potential risks which are approximately 3 in 1000 chance of bleeding, infection, and perforation  Perforation may require a surgeon to repair it    I also explained the small but significant chance of missing lesions with colonoscopy which has been reported to be about 5-10%     Check CBC  Check thyroid function  Check chemistry complete         Relevant Medications    polyethylene glycol (COLYTE) 4000 mL solution    linaCLOtide (Linzess) 145 MCG CAPS    Other Relevant Orders    CBC    Colonoscopy       Other    Vitamin D deficiency - Primary     Patient's vitamin D was a little bit low  She is not currently on vitamin D replacement  Check vitamin D level         Relevant Orders    Vitamin D 25 hydroxy    Family history of colonic polyps    Relevant Medications    polyethylene glycol (COLYTE) 4000 mL solution    Other Relevant Orders    Colonoscopy      _____________________________________________________________    HPI:   Yesenia Esquivel is a delightful 49-year-old woman whom seen in the office in consultation for chronic constipation dating back much of her life  She states that prior to use of laxatives she would go up to a week without a bowel  When she was younger she was fairly comfortable with this  However as she has been getting older she feels more uncomfortable without a bowel movement  She does recall maybe starting to take laxatives and/or enemas intermittently in her 25s  About 5 years ago she started using laxatives on a more regular basis  She would initially use senna or smooth move tea which contains senna  She was also using smooth move capsules during her pregnancy  She would take 4 senna capsules every other day and this would provide her with about a bowel movement every other day  She felt completely evacuated with these bowel movements  She would occasionally have some abdominal discomfort but no significant abdominal pain  This would improve with a bowel movement  There is been no rectal bleeding  She does not believe she has had any unintentional weight loss although she has had 2 children the last 4 years  More recently if she was not having bowel meds on a regular basis this would make her not want to eat  She feels very full    She did notice that 1 particular product but stopped working and she would have to switch different products  She is currently using a laxative that contains senna and a stool softener  She is taking 4 pills and this will lead her with a bowel movement every other day taking the medication every other day  She tried Amitiza 24 mcg twice a day for about 8 weeks  This provided her with a bowel movement every 4 to 5 days but she did not feel completely evacuated  This did lead to nausea however  She feels better using senna  She did try a friend's Linzess  She only took 2 pills  She is not sure the dose  2 pills did not help her  She did try MiraLAX in the past that did not help  She has tried a variety of dietary modifications and dietary supplements including Kambucha, probiotics etc      Does not smoke tobacco and does not drink alcohol  Both of her parents have had colon polyps  Her mom either started having polyps around age 39 or 48  Allergies   Allergen Reactions   • Hydrocodone-Acetaminophen Other (See Comments) and GI Intolerance     4 hours after wisdom tooth extraction, patient took pain pill and started spinning, vomiting, and sweating - unsure if related to pain medication or anesthesia      Current Outpatient Medications   Medication Sig Dispense Refill   • linaCLOtide (Linzess) 145 MCG CAPS Take 1 capsule (145 mcg total) by mouth in the morning 16 capsule 3   • polyethylene glycol (COLYTE) 4000 mL solution Take 4,000 mL by mouth once for 1 dose Take 4000 mL by mouth once for 1 dose   Use as directed 4000 mL 0   • Prenatal Vit-Fe Fumarate-FA (PRENATAL VITAMINS PO) Take by mouth     • senna-docusate sodium (SENOKOT-S) 8 6-50 mg per tablet Take 1 tablet by mouth every other day     • sertraline (ZOLOFT) 100 mg tablet Take 1 tablet (100 mg total) by mouth daily 90 tablet 0   • Microlet Lancets MISC Test x4 a day or as instructed 100 each 3     No current facility-administered medications for this visit      MEDICAL HISTORY:  Past Medical History:   Diagnosis Date   • Anemia     anemia of pregnancy   • Anxiety    • Complication of anesthesia     extreme nausea   • Depression    • Diabetes mellitus ([facility] [address] )     A2GDM   • Shingles     Past   • Vacuum-assisted vaginal delivery 2020   • Varicella     had as child     Past Surgical History:   Procedure Laterality Date   • APPENDECTOMY     • DILATION AND EVACUATION     • TONSILLECTOMY     • WISDOM TOOTH EXTRACTION       Social History     Substance and Sexual Activity   Alcohol Use Not Currently   • Alcohol/week: 0 0 standard drinks    Comment: socially prior to knowledge of pregnancy     Social History     Substance and Sexual Activity   Drug Use Never     Social History     Tobacco Use   Smoking Status Former   • Packs/day: 0 25   • Years: 8 00   • Pack years: 2 00   • Types: Cigarettes   • Quit date: 2017   • Years since quittin 2   Smokeless Tobacco Never     Family History   Problem Relation Age of Onset   • Depression Mother    • Hypertension Mother    • Heart attack Father         x5   • No Known Problems Sister    • Heart defect Brother         mitral valve cleft - septal defect - had sugery as child   • Alcohol abuse Brother    • Bipolar disorder Brother    • Depression Brother    • Pancreatitis Brother    • Cancer Maternal Grandmother         Lung cancer   • Lymphoma Maternal Grandmother    • Lung cancer Maternal Grandmother    • Stroke Maternal Grandmother    • Diabetes Paternal Grandmother    • Kidney failure Paternal Grandmother    • Heart attack Paternal Grandmother    • Heart attack Paternal Grandfather    • Heart failure Paternal Grandfather    • No Known Problems Brother    • Breast cancer Neg Hx    • Colon cancer Neg Hx    • Ovarian cancer Neg Hx        REVIEW OF SYSTEMS:  CONSTITUTIONAL: Denies any fever, chills, rigors, and weight loss  HEENT: No earache or tinnitus  Denies hearing loss or visual disturbances    CARDIOVASCULAR: No chest pain or palpitations  RESPIRATORY: Denies any cough, hemoptysis, shortness of breath or dyspnea on exertion  GASTROINTESTINAL: As noted in the History of Present Illness  GENITOURINARY: No problems with urination  Denies any hematuria or dysuria  NEUROLOGIC: No dizziness or vertigo, denies headaches  MUSCULOSKELETAL: Denies any muscle or joint pain  SKIN: Denies skin rashes or itching  ENDOCRINE: Denies excessive thirst  Denies intolerance to heat or cold  PSYCHOSOCIAL: She does have some anxiety  Objective   Blood pressure 100/60, pulse 74, resp  rate 18, height 5' 5" (1 651 m), weight 58 1 kg (128 lb), SpO2 98 %, currently breastfeeding  Body mass index is 21 3 kg/m²  PHYSICAL EXAM:   General Appearance: Alert, cooperative, no distress  HEENT: Normocephalic, atraumatic, anicteric  Neck: Supple, symmetrical, trachea midline  Lungs: Clear to auscultation bilaterally; no rales, rhonchi or wheezing; respirations unlabored   Heart: Regular rate and rhythm; no murmur, rub, or gallop  Abdomen: Soft, bowel sounds normal, non-tender, non-distended; no masses, there is no hepatosplenomegaly  No spider angiomas  Genitalia: Deferred   Rectal: Deferred   Extremities: No cyanosis, clubbing or edema   Skin: No jaundice, rashes, or lesions   Lymph nodes: No palpable cervical lymphadenopathy   Lab Results:   No visits with results within 2 Month(s) from this visit     Latest known visit with results is:   Admission on 08/24/2022, Discharged on 08/27/2022   Component Date Value   • ABO Grouping 08/24/2022 A    • Rh Factor 08/24/2022 Positive    • Antibody Screen 08/24/2022 Negative    • Specimen Expiration Date 08/24/2022 [id number]    • WBC 08/24/2022 10 67 (H)    • RBC 08/24/2022 3 96    • Hemoglobin 08/24/2022 12 9    • Hematocrit 08/24/2022 38 1    • MCV 08/24/2022 96    • MCH 08/24/2022 32 6    • MCHC 08/24/2022 33 9    • RDW 08/24/2022 13 4    • Platelets 53/61/5714 201    • MPV 08/24/2022 11 3    • RPR 08/24/2022 Non-Reactive    • POC Glucose 08/24/2022 71    • POC Glucose 08/24/2022 72    • POC Glucose 08/25/2022 87    • POC Glucose 08/25/2022 79    • POC Glucose 08/25/2022 71    • pH, Cord Art 08/25/2022 7 211 (L)    • pCO2, Cord Art 08/25/2022 61 1 (H)    • pO2, Cord Art 08/25/2022 24 4    • HCO3, Cord Art 08/25/2022 23 9    • Base Exc, Cord Art 08/25/2022 -5 2 (L)    • O2 Content, Cord Art 08/25/2022 10 1    • O2 Hgb, Arterial Cord 08/25/2022 43 1    • pH, Cord Joel 08/25/2022 7 345    • pCO2, Cord Joel 08/25/2022 39 4    • pO2, Cord Joel 08/25/2022 36 0    • HCO3, Cord Joel 08/25/2022 21 0    • Base Exc, Cord Joel 08/25/2022 -4 2 (L)    • O2 Cont, Cord Joel 08/25/2022 17 1    • O2 HGB,VENOUS CORD 08/25/2022 75 4    • PLACENTA IN STORAGE 08/25/2022 Placenta Discarded      Radiology Results:   No results found    Katie Thompson DO   03/23/23   Cc:

## 2023-04-11 DIAGNOSIS — F33.0 MILD EPISODE OF RECURRENT MAJOR DEPRESSIVE DISORDER (HCC): ICD-10-CM

## 2023-04-11 DIAGNOSIS — F41.1 GAD (GENERALIZED ANXIETY DISORDER): ICD-10-CM

## 2023-04-12 RX ORDER — SERTRALINE HYDROCHLORIDE 100 MG/1
100 TABLET, FILM COATED ORAL DAILY
Qty: 30 TABLET | Refills: 0 | Status: SHIPPED | OUTPATIENT
Start: 2023-04-12

## 2023-05-08 ENCOUNTER — OFFICE VISIT (OUTPATIENT)
Dept: PSYCHIATRY | Facility: CLINIC | Age: 33
End: 2023-05-08

## 2023-05-08 DIAGNOSIS — F32.5 MAJOR DEPRESSION IN REMISSION (HCC): Primary | ICD-10-CM

## 2023-05-08 DIAGNOSIS — F41.1 GAD (GENERALIZED ANXIETY DISORDER): ICD-10-CM

## 2023-05-08 DIAGNOSIS — F33.0 MILD EPISODE OF RECURRENT MAJOR DEPRESSIVE DISORDER (HCC): ICD-10-CM

## 2023-05-08 RX ORDER — SERTRALINE HYDROCHLORIDE 100 MG/1
100 TABLET, FILM COATED ORAL DAILY
Qty: 90 TABLET | Refills: 0 | Status: SHIPPED | OUTPATIENT
Start: 2023-05-08
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
## 2023-05-08 NOTE — BH TREATMENT PLAN
TREATMENT PLAN (Medication Management Only)        [facility]    Name and Date of Birth:  Clem Mar 27 y o  1990  Date of Treatment Plan: May 8, 2023  Diagnosis/Diagnoses:    1  Major depression in remission ([address] )    2  PEPE (generalized anxiety disorder)      Strengths/Personal Resources for Self-Care: taking medications as prescribed, family ties, good physical health, good understanding of illness, self-reliance  Area/Areas of need (in own words): anxiety  1  Long Term Goal: improve anxiety  Target Date:3 months - 8/8/2023  Person/Persons responsible for completion of goal: Elie Hernández  2  Short Term Objective (s) - How will we reach this goal?:   A  Provider new recommended medication/dosage changes and/or continue medication(s): continue current medications as prescribed  B  Attend medication management appointments regularly  C  Take psychiatric medications responsibly  Target Date:3 months - 8/8/2023  Person/Persons Responsible for Completion of Goal: Elie Hernández  Progress Towards Goals: continuing treatment  Treatment Modality: medication management every 3 months  Review due 180 days from date of this plan: 4 months - 9/8/2023  Expected length of service: ongoing treatment  My Physician/PA/NP and I have developed this plan together and I agree to work on the goals and objectives  I understand the treatment goals that were developed for my treatment

## 2023-05-15 ENCOUNTER — TELEPHONE (OUTPATIENT)
Dept: PSYCHIATRY | Facility: CLINIC | Age: 33
End: 2023-05-15

## 2023-05-15 DIAGNOSIS — F41.1 GAD (GENERALIZED ANXIETY DISORDER): Primary | ICD-10-CM

## 2023-05-15 RX ORDER — SERTRALINE HYDROCHLORIDE 25 MG/1
25 TABLET, FILM COATED ORAL DAILY
Qty: 90 TABLET | Refills: 0 | Status: SHIPPED | OUTPATIENT
Start: 2023-05-15 | End: 2023-08-07 | Stop reason: SDUPTHER

## 2023-06-30 ENCOUNTER — TELEPHONE (OUTPATIENT)
Dept: GASTROENTEROLOGY | Facility: CLINIC | Age: 33
End: 2023-06-30

## 2023-06-30 NOTE — TELEPHONE ENCOUNTER
Tried to call patient to schedule colonoscopy. Went to voicemail and unable to lvm as her mail box was full.

## 2023-07-07 ENCOUNTER — VBI (OUTPATIENT)
Dept: ADMINISTRATIVE | Facility: OTHER | Age: 33
End: 2023-07-07

## 2023-07-22 ENCOUNTER — APPOINTMENT (OUTPATIENT)
Dept: LAB | Facility: HOSPITAL | Age: 33
End: 2023-07-22
Payer: COMMERCIAL

## 2023-07-22 DIAGNOSIS — E78.5 HYPERLIPIDEMIA, UNSPECIFIED HYPERLIPIDEMIA TYPE: ICD-10-CM

## 2023-07-22 DIAGNOSIS — K59.09 CHRONIC CONSTIPATION: ICD-10-CM

## 2023-07-22 DIAGNOSIS — E55.9 VITAMIN D DEFICIENCY: ICD-10-CM

## 2023-07-22 DIAGNOSIS — Z86.32 HISTORY OF GESTATIONAL DIABETES: ICD-10-CM

## 2023-07-22 LAB
25(OH)D3 SERPL-MCNC: 23.6 NG/ML (ref 30–100)
ALBUMIN SERPL BCP-MCNC: 4.6 G/DL (ref 3.5–5)
ALP SERPL-CCNC: 65 U/L (ref 34–104)
ALT SERPL W P-5'-P-CCNC: 8 U/L (ref 7–52)
ANION GAP SERPL CALCULATED.3IONS-SCNC: 6 MMOL/L
AST SERPL W P-5'-P-CCNC: 12 U/L (ref 13–39)
BASOPHILS # BLD AUTO: 0.05 THOUSANDS/ÂΜL (ref 0–0.1)
BASOPHILS NFR BLD AUTO: 1 % (ref 0–1)
BILIRUB SERPL-MCNC: 0.32 MG/DL (ref 0.2–1)
BUN SERPL-MCNC: 15 MG/DL (ref 5–25)
CALCIUM SERPL-MCNC: 9.3 MG/DL (ref 8.4–10.2)
CHLORIDE SERPL-SCNC: 105 MMOL/L (ref 96–108)
CHOLEST SERPL-MCNC: 219 MG/DL
CO2 SERPL-SCNC: 31 MMOL/L (ref 21–32)
CREAT SERPL-MCNC: 0.78 MG/DL (ref 0.6–1.3)
EOSINOPHIL # BLD AUTO: 0.22 THOUSAND/ÂΜL (ref 0–0.61)
EOSINOPHIL NFR BLD AUTO: 4 % (ref 0–6)
ERYTHROCYTE [DISTWIDTH] IN BLOOD BY AUTOMATED COUNT: 12 % (ref 11.6–15.1)
EST. AVERAGE GLUCOSE BLD GHB EST-MCNC: 105 MG/DL
GFR SERPL CREATININE-BSD FRML MDRD: 99 ML/MIN/1.73SQ M
GLUCOSE P FAST SERPL-MCNC: 80 MG/DL (ref 65–99)
HBA1C MFR BLD: 5.3 %
HCT VFR BLD AUTO: 42.1 % (ref 34.8–46.1)
HDLC SERPL-MCNC: 70 MG/DL
HGB BLD-MCNC: 13.6 G/DL (ref 11.5–15.4)
IMM GRANULOCYTES # BLD AUTO: 0.01 THOUSAND/UL (ref 0–0.2)
IMM GRANULOCYTES NFR BLD AUTO: 0 % (ref 0–2)
LDLC SERPL CALC-MCNC: 141 MG/DL (ref 0–100)
LYMPHOCYTES # BLD AUTO: 1.94 THOUSANDS/ÂΜL (ref 0.6–4.47)
LYMPHOCYTES NFR BLD AUTO: 36 % (ref 14–44)
MCH RBC QN AUTO: 31 PG (ref 26.8–34.3)
MCHC RBC AUTO-ENTMCNC: 32.3 G/DL (ref 31.4–37.4)
MCV RBC AUTO: 96 FL (ref 82–98)
MONOCYTES # BLD AUTO: 0.46 THOUSAND/ÂΜL (ref 0.17–1.22)
MONOCYTES NFR BLD AUTO: 8 % (ref 4–12)
NEUTROPHILS # BLD AUTO: 2.78 THOUSANDS/ÂΜL (ref 1.85–7.62)
NEUTS SEG NFR BLD AUTO: 51 % (ref 43–75)
NRBC BLD AUTO-RTO: 0 /100 WBCS
PLATELET # BLD AUTO: 239 THOUSANDS/UL (ref 149–390)
PMV BLD AUTO: 10.1 FL (ref 8.9–12.7)
POTASSIUM SERPL-SCNC: 4.7 MMOL/L (ref 3.5–5.3)
PROT SERPL-MCNC: 6.8 G/DL (ref 6.4–8.4)
RBC # BLD AUTO: 4.39 MILLION/UL (ref 3.81–5.12)
SODIUM SERPL-SCNC: 142 MMOL/L (ref 135–147)
TRIGL SERPL-MCNC: 42 MG/DL
TSH SERPL DL<=0.05 MIU/L-ACNC: 1.79 UIU/ML (ref 0.45–4.5)
WBC # BLD AUTO: 5.46 THOUSAND/UL (ref 4.31–10.16)

## 2023-07-22 PROCEDURE — 80061 LIPID PANEL: CPT

## 2023-07-22 PROCEDURE — 84443 ASSAY THYROID STIM HORMONE: CPT

## 2023-07-22 PROCEDURE — 36415 COLL VENOUS BLD VENIPUNCTURE: CPT

## 2023-07-22 PROCEDURE — 80053 COMPREHEN METABOLIC PANEL: CPT

## 2023-07-22 PROCEDURE — 85025 COMPLETE CBC W/AUTO DIFF WBC: CPT

## 2023-07-22 PROCEDURE — 82306 VITAMIN D 25 HYDROXY: CPT

## 2023-07-24 NOTE — RESULT ENCOUNTER NOTE
Please inform patient that vitamin D is mildly low at 23.62 years ago it was low at 28.5. Please see that she is taking at least 2000 international units of vitamin D3 daily and she can check with Dr. Tila Monson to see if this is an appropriate dose. Her CBC was normal.  Her chemistry complete was normal.  Her thyroid function was normal.  LDL cholesterol was elevated at 141 and total cholesterol is high at 219 once again follow-up with Dr. Tila Monson regarding this.   Thank you

## 2023-08-07 ENCOUNTER — TELEMEDICINE (OUTPATIENT)
Dept: PSYCHIATRY | Facility: CLINIC | Age: 33
End: 2023-08-07

## 2023-08-07 DIAGNOSIS — F41.1 GAD (GENERALIZED ANXIETY DISORDER): ICD-10-CM

## 2023-08-07 DIAGNOSIS — F33.0 MILD EPISODE OF RECURRENT MAJOR DEPRESSIVE DISORDER (HCC): ICD-10-CM

## 2023-08-07 DIAGNOSIS — F32.5 MAJOR DEPRESSION IN REMISSION (HCC): Primary | ICD-10-CM

## 2023-08-07 RX ORDER — SERTRALINE HYDROCHLORIDE 25 MG/1
25 TABLET, FILM COATED ORAL DAILY
Qty: 90 TABLET | Refills: 0 | Status: SHIPPED | OUTPATIENT
Start: 2023-08-07

## 2023-08-07 RX ORDER — SERTRALINE HYDROCHLORIDE 100 MG/1
100 TABLET, FILM COATED ORAL DAILY
Qty: 90 TABLET | Refills: 0 | Status: SHIPPED | OUTPATIENT
Start: 2023-08-07
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
## 2023-10-26 ENCOUNTER — TELEPHONE (OUTPATIENT)
Dept: PSYCHIATRY | Facility: CLINIC | Age: 33
End: 2023-10-26

## 2023-10-26 NOTE — TELEPHONE ENCOUNTER
Jb Andrade does not have insurance at the moment but would like to speak to you about changing medications

## 2023-10-27 DIAGNOSIS — F33.0 MILD EPISODE OF RECURRENT MAJOR DEPRESSIVE DISORDER (HCC): ICD-10-CM

## 2023-10-27 DIAGNOSIS — F41.1 GAD (GENERALIZED ANXIETY DISORDER): ICD-10-CM

## 2023-10-27 RX ORDER — SERTRALINE HYDROCHLORIDE 100 MG/1
100 TABLET, FILM COATED ORAL DAILY
Qty: 90 TABLET | Refills: 0 | Status: SHIPPED | OUTPATIENT
Start: 2023-10-27 | End: 2023-11-28 | Stop reason: SDUPTHER

## 2023-10-27 NOTE — TELEPHONE ENCOUNTER
Spoke to [employer]. She is noting increase in excessive worry, muscle tension, melvin neck. +family stressors. Plan: increase zoloft to 150 mg/d. F/u next 1-2 mths.

## 2023-11-28 ENCOUNTER — OFFICE VISIT (OUTPATIENT)
Dept: PSYCHIATRY | Facility: CLINIC | Age: 33
End: 2023-11-28
Payer: COMMERCIAL

## 2023-11-28 DIAGNOSIS — F41.1 GAD (GENERALIZED ANXIETY DISORDER): ICD-10-CM

## 2023-11-28 DIAGNOSIS — F32.5 MAJOR DEPRESSION IN REMISSION (HCC): Primary | ICD-10-CM

## 2023-11-28 PROCEDURE — 99213 OFFICE O/P EST LOW 20 MIN: CPT | Performed by: PHYSICIAN ASSISTANT

## 2023-11-28 RX ORDER — SERTRALINE HYDROCHLORIDE 100 MG/1
100 TABLET, FILM COATED ORAL DAILY
Qty: 90 TABLET | Refills: 0 | Status: SHIPPED | OUTPATIENT
Start: 2023-11-28
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
## 2024-03-19 ENCOUNTER — OFFICE VISIT (OUTPATIENT)
Dept: PSYCHIATRY | Facility: CLINIC | Age: 34
End: 2024-03-19

## 2024-03-19 DIAGNOSIS — F41.1 GAD (GENERALIZED ANXIETY DISORDER): ICD-10-CM

## 2024-03-19 DIAGNOSIS — F32.5 MAJOR DEPRESSION IN REMISSION (HCC): Primary | ICD-10-CM

## 2024-03-19 PROCEDURE — 99214 OFFICE O/P EST MOD 30 MIN: CPT | Performed by: PHYSICIAN ASSISTANT

## 2024-03-19 RX ORDER — SERTRALINE HYDROCHLORIDE 100 MG/1
100 TABLET, FILM COATED ORAL DAILY
Qty: 90 TABLET | Refills: 0 | Status: SHIPPED | OUTPATIENT
Start: 2024-03-19

## 2024-03-19 RX ORDER — TRAZODONE HYDROCHLORIDE 50 MG/1
25 TABLET ORAL
Qty: 45 TABLET | Refills: 0 | Status: SHIPPED | OUTPATIENT
Start: 2024-03-19
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
## 2024-06-28 DIAGNOSIS — F41.1 GAD (GENERALIZED ANXIETY DISORDER): ICD-10-CM

## 2024-06-28 RX ORDER — TRAZODONE HYDROCHLORIDE 50 MG/1
25 TABLET ORAL
Qty: 45 TABLET | Refills: 0 | Status: SHIPPED | OUTPATIENT
Start: 2024-06-28

## 2024-08-27 ENCOUNTER — TELEPHONE (OUTPATIENT)
Age: 34
End: 2024-08-27

## 2024-08-27 DIAGNOSIS — F41.1 GAD (GENERALIZED ANXIETY DISORDER): ICD-10-CM

## 2024-08-27 RX ORDER — SERTRALINE HYDROCHLORIDE 100 MG/1
100 TABLET, FILM COATED ORAL DAILY
Qty: 90 TABLET | Refills: 0 | Status: SHIPPED | OUTPATIENT
Start: 2024-08-27

## 2024-08-27 RX ORDER — TRAZODONE HYDROCHLORIDE 50 MG/1
25 TABLET, FILM COATED ORAL
Qty: 45 TABLET | Refills: 0 | Status: SHIPPED | OUTPATIENT
Start: 2024-08-27

## 2024-08-27 NOTE — TELEPHONE ENCOUNTER
Patient called in to schedule a follow up appointment with their medication management provider.    Patient is now scheduled on 8/29 @11:30 virtually via [employer]

## 2024-08-29 ENCOUNTER — TELEPHONE (OUTPATIENT)
Dept: PSYCHIATRY | Facility: CLINIC | Age: 34
End: 2024-08-29

## 2024-08-29 ENCOUNTER — TELEMEDICINE (OUTPATIENT)
Dept: PSYCHIATRY | Facility: CLINIC | Age: 34
End: 2024-08-29

## 2024-08-29 DIAGNOSIS — F32.5 MAJOR DEPRESSION IN REMISSION (HCC): Primary | ICD-10-CM

## 2024-08-29 DIAGNOSIS — F41.1 GAD (GENERALIZED ANXIETY DISORDER): ICD-10-CM

## 2024-08-29 PROCEDURE — 99212 OFFICE O/P EST SF 10 MIN: CPT | Performed by: PHYSICIAN ASSISTANT
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
## 2024-10-04 DIAGNOSIS — F41.1 GAD (GENERALIZED ANXIETY DISORDER): ICD-10-CM

## 2024-10-04 RX ORDER — TRAZODONE HYDROCHLORIDE 50 MG/1
75 TABLET, FILM COATED ORAL
Qty: 100 TABLET | Refills: 0 | Status: SHIPPED | OUTPATIENT
Start: 2024-10-04

## 2024-10-04 NOTE — TELEPHONE ENCOUNTER
Reason for call:   [x] Refill   [] Prior Auth  [x] Other: Patient stated she has been taking 3/4 of a tablet so she is running out faster than she normally would. Please review     Office:   [] PCP/Provider -   [x] Specialty/Provider -     Medication:  traZODone (DESYREL) 50 mg tablet    Dose/Frequency: Take 0.5 tablets (25 mg total) by mouth daily at bedtime as needed for sleep     Quantity: 45    Pharmacy: [facility] [address] [place] [address] HYACINTH QURESHI [phone]    Does the patient have enough for 3 days?   [] Yes   [x] No - Send as HP to POD

## 2024-11-11 ENCOUNTER — TELEPHONE (OUTPATIENT)
Age: 34
End: 2024-11-11

## 2024-11-11 NOTE — TELEPHONE ENCOUNTER
Received call from pt seeking transfer of care. Pt had last seen Dr. Brown approximately 2 years ago.  Pt has been seen by [facility] specialty providers. Pt has been having headaches for weeks.  Treating with OTC ibuprofen and ice packs.   Pt currently nursing 2 year old.  New pt appt made for Friday, 11/22/24 at 1020 with Salma BOOTH.

## 2024-11-12 ENCOUNTER — OFFICE VISIT (OUTPATIENT)
Dept: PSYCHIATRY | Facility: CLINIC | Age: 34
End: 2024-11-12

## 2024-11-12 DIAGNOSIS — F41.1 GAD (GENERALIZED ANXIETY DISORDER): ICD-10-CM

## 2024-11-12 DIAGNOSIS — F32.5 MAJOR DEPRESSION IN REMISSION (HCC): Primary | ICD-10-CM

## 2024-11-12 PROCEDURE — 99213 OFFICE O/P EST LOW 20 MIN: CPT | Performed by: PHYSICIAN ASSISTANT

## 2024-11-12 RX ORDER — SERTRALINE HYDROCHLORIDE 100 MG/1
100 TABLET, FILM COATED ORAL DAILY
Qty: 90 TABLET | Refills: 0 | Status: SHIPPED | OUTPATIENT
Start: 2024-11-12

## 2024-11-12 NOTE — TELEPHONE ENCOUNTER
"Nurse spoke with Archana # [phone] (ok to leave detailed message on voice mail).        Nurse spoke with Archana - \"I tense up with anxiety and this creates headaches. I have taken ibuprofen every day for the last 3 weeks.\"      \"I am not feeling anxious now. I just have the pain from headaches.\"    \"I wake up fine and then each day by 11, my headache is back.\"    \"I do not have insurance and my PCP is no longer in [facility]. The earliest I can see the new PCP is on 11/22.\"     Archana denies CP, SOB. Headache is behind eyes and ears, radiates from neck to top of head. I am trying to drink more water and stay hydrated, still I get the headaches.\"      \"Ibuprofen helps with the HA but, it comes back the next day.\"     \"I do not want to go to a walk in center since I do not want to pay for care. Can you please ask Samara if she can prescribe something? \"        Pharmacy:   [facility]  [place], [place]           "
Having a headache for the last   
I can't prescribe without seeing Archana.  I can fit her in today or this week.   Today I can see her at noon.  Can you call her Laney or do you want me to?   
I will call her Jose Carolina to see her in person.  
Nurse spoke with Archana. Today she has a dentist appointment at 11 am - asking for a virtual appointment any other time this week.   
Spoke to Archana.  She is coming in today after her dentist appt  
present
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
## 2024-11-13 VITALS — HEART RATE: 73 BPM | DIASTOLIC BLOOD PRESSURE: 80 MMHG | SYSTOLIC BLOOD PRESSURE: 114 MMHG

## 2024-11-13 NOTE — ASSESSMENT & PLAN NOTE
Archana prefers to hold off on ind therapy until she has ins.  Will increase zoloft to 175 mg-200 mg.  Encouraged participation in Alanon and TAHIRA 12 steps.   Orders:    sertraline (ZOLOFT) 100 mg tablet; Take 1 tablet (100 mg total) by mouth daily    sertraline (Zoloft) 50 mg tablet; Take 1.5 tablets (75 mg total) by mouth daily With 100 mg

## 2024-12-09 ENCOUNTER — TELEPHONE (OUTPATIENT)
Age: 34
End: 2024-12-09

## 2024-12-09 NOTE — TELEPHONE ENCOUNTER
Patient is calling regarding rescheduling an appointment.    Date/Time: 12.23.24 @ 10:30am    Reason: New Insurance starts January 1st    Patient was rescheduled: YES [x] NO []  If yes, when was Patient reschedule for: 1.14.25 @ 2:30pm    Patient requesting call back to reschedule: YES [] NO [x]    Patient noted they will have [employer] Flex/PPO Bronze in January

## 2025-01-14 ENCOUNTER — OFFICE VISIT (OUTPATIENT)
Dept: FAMILY MEDICINE CLINIC | Facility: CLINIC | Age: 35
End: 2025-01-14
Payer: COMMERCIAL

## 2025-01-14 ENCOUNTER — TELEMEDICINE (OUTPATIENT)
Dept: PSYCHIATRY | Facility: CLINIC | Age: 35
End: 2025-01-14
Payer: COMMERCIAL

## 2025-01-14 VITALS
TEMPERATURE: 98.6 F | DIASTOLIC BLOOD PRESSURE: 74 MMHG | HEART RATE: 74 BPM | SYSTOLIC BLOOD PRESSURE: 98 MMHG | WEIGHT: 130 LBS | BODY MASS INDEX: 21.66 KG/M2 | OXYGEN SATURATION: 98 % | HEIGHT: 65 IN

## 2025-01-14 DIAGNOSIS — E55.9 VITAMIN D INSUFFICIENCY: ICD-10-CM

## 2025-01-14 DIAGNOSIS — Z11.59 NEED FOR HEPATITIS C SCREENING TEST: ICD-10-CM

## 2025-01-14 DIAGNOSIS — R29.898 NECK TIGHTNESS: ICD-10-CM

## 2025-01-14 DIAGNOSIS — F41.1 GAD (GENERALIZED ANXIETY DISORDER): ICD-10-CM

## 2025-01-14 DIAGNOSIS — R00.2 PALPITATIONS: ICD-10-CM

## 2025-01-14 DIAGNOSIS — Z76.89 ENCOUNTER TO ESTABLISH CARE: Primary | ICD-10-CM

## 2025-01-14 DIAGNOSIS — Z12.4 SCREENING FOR CERVICAL CANCER: ICD-10-CM

## 2025-01-14 DIAGNOSIS — Z13.1 SCREENING FOR DIABETES MELLITUS: ICD-10-CM

## 2025-01-14 DIAGNOSIS — F32.5 MAJOR DEPRESSION IN REMISSION (HCC): Primary | ICD-10-CM

## 2025-01-14 DIAGNOSIS — G44.209 TENSION HEADACHE: ICD-10-CM

## 2025-01-14 DIAGNOSIS — Z23 ENCOUNTER FOR IMMUNIZATION: ICD-10-CM

## 2025-01-14 DIAGNOSIS — Z13.29 SCREENING FOR THYROID DISORDER: ICD-10-CM

## 2025-01-14 DIAGNOSIS — E78.5 HYPERLIPIDEMIA, UNSPECIFIED HYPERLIPIDEMIA TYPE: ICD-10-CM

## 2025-01-14 PROBLEM — O99.019 ANEMIA IN PREGNANCY: Status: ACTIVE | Noted: 2025-01-14

## 2025-01-14 PROCEDURE — 99214 OFFICE O/P EST MOD 30 MIN: CPT | Performed by: NURSE PRACTITIONER

## 2025-01-14 PROCEDURE — 99213 OFFICE O/P EST LOW 20 MIN: CPT | Performed by: PHYSICIAN ASSISTANT

## 2025-01-14 PROCEDURE — 93000 ELECTROCARDIOGRAM COMPLETE: CPT | Performed by: NURSE PRACTITIONER

## 2025-01-14 RX ORDER — TRAZODONE HYDROCHLORIDE 50 MG/1
50 TABLET, FILM COATED ORAL
Qty: 90 TABLET | Refills: 0 | Status: SHIPPED | OUTPATIENT
Start: 2025-01-14

## 2025-01-14 RX ORDER — SERTRALINE HYDROCHLORIDE 100 MG/1
100 TABLET, FILM COATED ORAL DAILY
Qty: 90 TABLET | Refills: 0 | Status: SHIPPED | OUTPATIENT
Start: 2025-01-14
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
## 2025-01-14 NOTE — PROGRESS NOTES
Name: Archana Zavaleta      : 1990      MRN: [id number]  Encounter Provider: EMMY Cameron  Encounter Date: 2025   Encounter department: [facility]  :  Assessment & Plan  Encounter to establish care  Presents to establish care. Previous PCP Dr. Brown.   She is breastfeeding for 5 years. Children ages 5 and 2.   She was a smoker from age 16 until 2017. Denies any drugs or alcohol use.   She is a homemaker.        Encounter for immunization         Screening for cervical cancer  Has an appointment scheduled the end of this month for a pap smear        Need for hepatitis C screening test         Vitamin D insufficiency    Orders:  •  Vitamin D 25 hydroxy; Future    Hyperlipidemia, unspecified hyperlipidemia type    Orders:  •  CBC and differential; Future  •  Comprehensive metabolic panel; Future  •  Lipid panel; Future    Screening for thyroid disorder    Orders:  •  TSH, 3rd generation with Free T4 reflex; Future    Screening for diabetes mellitus    Orders:  •  Hemoglobin A1C; Future    Tension headache  Gets intermittent tension headaches from stiffness and pain in the neck, Carries lots of tension in her neck from anxiety and also from breastfeeding positions. She uses heat, massage prn, Advil prn and it only helps some.     Orders:  •  Ambulatory Referral to Physical Therapy; Future    Neck tightness    Orders:  •  Ambulatory Referral to Physical Therapy; Future    Palpitations  States that for a while now, she gets fluttering in her chest when she lays down at night, every night. She also gets lightheaded when she changes positions or when she is at the store shopping. She sometimes feel like eating helps this, even if she ate before it happened. She grazes through out the day, only meal is her breakfast- oatmeal. Drinks a few seltzer castellano a day but not a lot. Coffee in the AM 1-2 cups. Denies alcohol.   Increase fluids, compression stockings, eat on  "routine basis, change positions slowly, if symptoms resolve, do not need to have Holter completed. She did have this in 2016 and had an unremarkable echo.   Orders:  •  Holter monitor; Future  •  POCT ECG           History of Present Illness     HPI  Review of Systems   Constitutional:  Negative for chills and fever.   Respiratory:  Negative for cough and shortness of breath.    Cardiovascular:  Negative for chest pain and palpitations.   Gastrointestinal:  Negative for abdominal pain, constipation, diarrhea, nausea and vomiting.   Genitourinary:  Negative for dysuria and hematuria.   Musculoskeletal:  Negative for arthralgias and back pain.   Skin:  Negative for color change and rash.   Neurological:  Negative for dizziness, seizures, syncope and headaches.   All other systems reviewed and are negative.      Objective   BP 98/74 (BP Location: Left arm, Patient Position: Standing)   Pulse 74   Temp 98.6 °F (37 °C)   Ht 5' 5\" (1.651 m)   Wt 59 kg (130 lb)   LMP 01/04/2025 (Exact Date)   SpO2 98%   Breastfeeding Yes   BMI 21.63 kg/m²      Physical Exam  Vitals and nursing note reviewed.   Constitutional:       General: She is not in acute distress.     Appearance: She is well-developed.   HENT:      Head: Normocephalic and atraumatic.   Cardiovascular:      Rate and Rhythm: Normal rate and regular rhythm.      Pulses: Normal pulses.      Heart sounds: Normal heart sounds. No murmur heard.  Pulmonary:      Effort: Pulmonary effort is normal. No respiratory distress.      Breath sounds: Normal breath sounds.   Musculoskeletal:         General: No swelling.      Cervical back: Neck supple.   Skin:     General: Skin is warm and dry.      Capillary Refill: Capillary refill takes less than 2 seconds.   Neurological:      Mental Status: She is alert.   Psychiatric:         Mood and Affect: Mood normal.         "

## 2025-01-15 NOTE — ASSESSMENT & PLAN NOTE
Orders:    sertraline (ZOLOFT) 100 mg tablet; Take 1 tablet (100 mg total) by mouth daily    sertraline (Zoloft) 50 mg tablet; Take 1 tablet (50 mg total) by mouth daily With 100 mg    traZODone (DESYREL) 50 mg tablet; Take 1 tablet (50 mg total) by mouth daily at bedtime as needed for sleep

## 2025-01-18 ENCOUNTER — APPOINTMENT (OUTPATIENT)
Dept: LAB | Facility: HOSPITAL | Age: 35
End: 2025-01-18
Payer: COMMERCIAL

## 2025-01-18 DIAGNOSIS — E78.5 HYPERLIPIDEMIA, UNSPECIFIED HYPERLIPIDEMIA TYPE: ICD-10-CM

## 2025-01-18 DIAGNOSIS — E55.9 VITAMIN D INSUFFICIENCY: ICD-10-CM

## 2025-01-18 DIAGNOSIS — Z13.1 SCREENING FOR DIABETES MELLITUS: ICD-10-CM

## 2025-01-18 DIAGNOSIS — Z13.29 SCREENING FOR THYROID DISORDER: ICD-10-CM

## 2025-01-18 LAB
ALBUMIN SERPL BCG-MCNC: 4.6 G/DL (ref 3.5–5)
ALP SERPL-CCNC: 50 U/L (ref 34–104)
ALT SERPL W P-5'-P-CCNC: 8 U/L (ref 7–52)
ANION GAP SERPL CALCULATED.3IONS-SCNC: 7 MMOL/L (ref 4–13)
AST SERPL W P-5'-P-CCNC: 11 U/L (ref 13–39)
BASOPHILS # BLD AUTO: 0.03 THOUSANDS/ΜL (ref 0–0.1)
BASOPHILS NFR BLD AUTO: 1 % (ref 0–1)
BILIRUB SERPL-MCNC: 0.35 MG/DL (ref 0.2–1)
BUN SERPL-MCNC: 12 MG/DL (ref 5–25)
CALCIUM SERPL-MCNC: 8.9 MG/DL (ref 8.4–10.2)
CHLORIDE SERPL-SCNC: 104 MMOL/L (ref 96–108)
CHOLEST SERPL-MCNC: 165 MG/DL (ref ?–200)
CO2 SERPL-SCNC: 29 MMOL/L (ref 21–32)
CREAT SERPL-MCNC: 0.76 MG/DL (ref 0.6–1.3)
EOSINOPHIL # BLD AUTO: 0.2 THOUSAND/ΜL (ref 0–0.61)
EOSINOPHIL NFR BLD AUTO: 4 % (ref 0–6)
ERYTHROCYTE [DISTWIDTH] IN BLOOD BY AUTOMATED COUNT: 12.1 % (ref 11.6–15.1)
EST. AVERAGE GLUCOSE BLD GHB EST-MCNC: 105 MG/DL
GFR SERPL CREATININE-BSD FRML MDRD: 101 ML/MIN/1.73SQ M
GLUCOSE P FAST SERPL-MCNC: 86 MG/DL (ref 65–99)
HBA1C MFR BLD: 5.3 %
HCT VFR BLD AUTO: 40.5 % (ref 34.8–46.1)
HDLC SERPL-MCNC: 54 MG/DL
HGB BLD-MCNC: 13.2 G/DL (ref 11.5–15.4)
IMM GRANULOCYTES # BLD AUTO: 0.02 THOUSAND/UL (ref 0–0.2)
IMM GRANULOCYTES NFR BLD AUTO: 0 % (ref 0–2)
LDLC SERPL CALC-MCNC: 87 MG/DL (ref 0–100)
LYMPHOCYTES # BLD AUTO: 0.96 THOUSANDS/ΜL (ref 0.6–4.47)
LYMPHOCYTES NFR BLD AUTO: 19 % (ref 14–44)
MCH RBC QN AUTO: 30.6 PG (ref 26.8–34.3)
MCHC RBC AUTO-ENTMCNC: 32.6 G/DL (ref 31.4–37.4)
MCV RBC AUTO: 94 FL (ref 82–98)
MONOCYTES # BLD AUTO: 0.56 THOUSAND/ΜL (ref 0.17–1.22)
MONOCYTES NFR BLD AUTO: 11 % (ref 4–12)
NEUTROPHILS # BLD AUTO: 3.24 THOUSANDS/ΜL (ref 1.85–7.62)
NEUTS SEG NFR BLD AUTO: 65 % (ref 43–75)
NONHDLC SERPL-MCNC: 111 MG/DL
NRBC BLD AUTO-RTO: 0 /100 WBCS
PLATELET # BLD AUTO: 243 THOUSANDS/UL (ref 149–390)
PMV BLD AUTO: 9.9 FL (ref 8.9–12.7)
POTASSIUM SERPL-SCNC: 4 MMOL/L (ref 3.5–5.3)
PROT SERPL-MCNC: 6.8 G/DL (ref 6.4–8.4)
RBC # BLD AUTO: 4.32 MILLION/UL (ref 3.81–5.12)
SODIUM SERPL-SCNC: 140 MMOL/L (ref 135–147)
TRIGL SERPL-MCNC: 122 MG/DL (ref ?–150)
TSH SERPL DL<=0.05 MIU/L-ACNC: 2.1 UIU/ML (ref 0.45–4.5)
WBC # BLD AUTO: 5.01 THOUSAND/UL (ref 4.31–10.16)

## 2025-01-18 PROCEDURE — 80053 COMPREHEN METABOLIC PANEL: CPT

## 2025-01-18 PROCEDURE — 85025 COMPLETE CBC W/AUTO DIFF WBC: CPT

## 2025-01-18 PROCEDURE — 80061 LIPID PANEL: CPT

## 2025-01-18 PROCEDURE — 82306 VITAMIN D 25 HYDROXY: CPT

## 2025-01-18 PROCEDURE — 83036 HEMOGLOBIN GLYCOSYLATED A1C: CPT

## 2025-01-18 PROCEDURE — 36415 COLL VENOUS BLD VENIPUNCTURE: CPT

## 2025-01-18 PROCEDURE — 84443 ASSAY THYROID STIM HORMONE: CPT

## 2025-01-19 LAB — 25(OH)D3 SERPL-MCNC: 19.8 NG/ML (ref 30–100)

## 2025-01-20 ENCOUNTER — RESULTS FOLLOW-UP (OUTPATIENT)
Dept: FAMILY MEDICINE CLINIC | Facility: CLINIC | Age: 35
End: 2025-01-20

## 2025-01-20 DIAGNOSIS — E55.9 VITAMIN D DEFICIENCY: Primary | ICD-10-CM

## 2025-01-20 NOTE — TELEPHONE ENCOUNTER
Pt called back and states she's still breastfeeding. Trying to wean off currently. Pt states she is currently taking liquid Vit D, 3 drops for 400IU once a day. However the bottles states she can take it up to 3 times a day. Pt states she doesn't always remember to take it daily though. Brand is Now Liquid Vitamin D and she purchased it off [employer].     Pt also inquired about her AST levels and was wondering what those meant.     Please advise, thanks.

## 2025-01-20 NOTE — TELEPHONE ENCOUNTER
----- Message from EMMY Rios sent at 1/20/2025 11:32 AM EST -----  Cholesterol has improved. Vit d is low at 19.8. Please verify if she is still breastfeeding. Thanks

## 2025-01-20 NOTE — TELEPHONE ENCOUNTER
----- Message from EMMY Rios sent at 1/20/2025  2:46 PM EST -----  Okay when she is no longer breastfeeding, we can do higher doses. Liver function is all in an acceptable range.  ----- Message -----  From: Maria Del Rosario Conrad MA  Sent: 1/20/2025   1:43 PM EST  To: EMMY Cameron      ----- Message -----  From: Ronda Colindres  Sent: 1/20/2025   1:35 PM EST  To: [facility]

## 2025-02-24 DIAGNOSIS — F41.1 GAD (GENERALIZED ANXIETY DISORDER): ICD-10-CM

## 2025-02-24 RX ORDER — TRAZODONE HYDROCHLORIDE 50 MG/1
50 TABLET ORAL
Qty: 90 TABLET | Refills: 0 | Status: SHIPPED | OUTPATIENT
Start: 2025-02-24

## 2025-02-27 NOTE — PROGRESS NOTES
"PT Evaluation     Today's date: 3/3/25  Patient name: Archana Zavaleta  : 1990  MRN: [id number]  Referring provider: Argelia Corbett, *  Dx:   Encounter Diagnosis     ICD-10-CM    1. Neck tightness  R29.898       2. Tension headache  G44.209                      Assessment  Impairments: abnormal or restricted ROM, abnormal movement, activity intolerance, impaired physical strength, lacks appropriate home exercise program, pain with function and safety issue  Symptom irritability: moderate    Assessment details: Archana Zavaleta is a 34 y.o. female presenting to outpatient physical therapy with noted impairments including pain, impaired soft tissue mobility, reduced range of motion, reduced strength, reduced postural awareness, and reduced activity tolerance. Signs and symptoms at present are consistent with referring diagnosis of neck pain and tension HA's. Due to noted impairments, the patient's present functional limitations include difficulty with ADLs with increased need for assistance, reliance on medication and/or modalities for pain relief, reduced tolerance for functional mobility and activity, and difficulty completing  and HH responsibilities. Patient to benefit from skilled outpatient physical therapy 1x/week, every other week for 6-10 weeks  per pt request due to financial concerns in order to reduce pain, maximize pain free range of motion, increase strength and stability, and improve functional mobility/functional activity in order to maximize return to prior level of function with reduced limitations. Home exercise program was provided and all questions answered to patient's level of satisfaction. Thank you for your referral.          Goals  STGs to be achieved in 4 weeks:  1. Pt to demonstrate reduced subjective pain rating \"at worst\" by at least 2-3 points from Initial Eval in order to allow for reduced pain with ADLs and improved functional activity tolerance.   2. Pt " "to demonstrate increased AROM of cerv SB and rotation by at least 5-10 degrees in order to allow for greater ease and independence with ADLs and functional mobility.   3  Pt to demonstrate increased MMT of B shldr flex, IR and ER by at least 1/2-1 grade in order to improve safety and stability with ADLs and functional mobility.     LTGs to be achieved in 6-8 weeks:  1. Pt will be I with HEP in order to continue to improve quality of life and independence and reduce risk for re-injury.   2. Pt to demonstrate return to  chores and childcare without limitations or restrictions.   3. Pt to demonstrate improved function as noted by achieving or exceeding predicted score on FOTO outcomes assessment tool.       Plan  Patient would benefit from: skilled physical therapy  Other planned modality interventions: Modalities prn for symptom management    Planned therapy interventions: manual therapy, neuromuscular re-education, therapeutic exercise, therapeutic training, home exercise program and orthotic fitting/training    Frequency: 2x month  Duration in weeks: 10  Plan of Care beginning date: 3/3/2025  Plan of Care expiration date: 5/12/2025  Treatment plan discussed with: PTA and patient    Subjective Evaluation    History of Present Illness  Mechanism of injury: Pt notes she has anxiety and holds her shldrs in a tight position. 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 Notes she gets tension HA's which travel up the back of her head.  Pt has had sx intermittently for the past 10 years. States she gets massages every couple of months. Has seen a chiropractor sporadically, has a theragun, uses  and heating pad. Pain is always present at 1-2/10, but worsens at times. Currently feels \"a little tight\".  Notes her neck seems \"to lock\" and has caused HA for days in the past.   Has diff sleeping, if lays on side, wakes up in pain. " Feels locked into sleeping on her back. Uses pretty flat pillow.          Recurrent probem    Quality of life: good    Patient Goals  Patient goals for therapy: decreased pain, increased motion and increased strength  Patient goal: diff sleeping  Pain  Current pain ratin  At best pain ratin  At worst pain ratin  Location: neck- B  Quality: tight, sharp, pulling, dull ache and discomfort  Relieving factors: heat and medications (ms relaxants)  Aggravating factors: overhead activity and lifting (anxiety)  Progression: improved    Social Support  Lives with: young children and spouse (children are 5, 21/2)    Employment status: not working  Hand dominance: right    Treatments  Previous treatment: chiropractic, massage and medication  Current treatment: medication and physical therapy      Objective     Concurrent Complaints  Positive for disturbed sleep, dizziness and headaches. Negative for respiratory pain, visual change and history of trauma    Postural Observations  Seated posture: fair  Standing posture: fair  Correction of posture: makes symptoms worse    Additional Postural Observation Details  L shld elevated,fwd head, neg scoliosis, hip height equal    Neurological Testing     Sensation   Cervical/Thoracic   Left   Intact: light touch    Right   Intact: light touch    Additional Neurological Details  Reports she has been getting L buttock numbness radiating down LLE    Active Range of Motion   Cervical/Thoracic Spine       Cervical    Flexion: 50 degrees  WFL  Extension: 50 degrees     WFL  Left lateral flexion: 25 degrees     Restriction level: moderate  Right lateral flexion: 20 degrees     Restriction level moderate  Left rotation: 60 degrees  Right rotation: 58 degrees       Strength/Myotome Testing     Left Shoulder     Planes of Motion   Flexion: 4   Abduction: 5   External rotation at 0°: 4   Internal rotation at 0°: 4     Right Shoulder     Planes of Motion   Flexion: 4   Abduction: 5  "  External rotation at 0°: 4   Internal rotation at 0°: 4     Left Elbow   Flexion: 5  Extension: 4+    Right Elbow   Flexion: 5  Extension: 4+    Left Wrist/Hand   Wrist extension: 4+  Wrist flexion: 4+    Right Wrist/Hand   Wrist extension: 5  Wrist flexion: 5    Tests       Thoracic   Negative slump test.     Left Shoulder   Negative drop arm, empty can and full can.     Right Shoulder   Negative drop arm, empty can and full can.     Additional Tests Details  Lev scap stretch- tighter L than R    General Comments:    Upper quarter screen   Shoulder: unremarkable  Elbow: unremarkable  Hand/wrist: unremarkable  Neuro Exam:     Headaches   Patient reports headaches: Yes.              Precautions: anxiety    Re-eval Date: 4/14/25    Date 3/3/25       Visit Count 1       FOTO Comp 3/3       Pain In See eval       Pain Out              Manuals 3/3       SOR        STM B UT/cerv areas                        Neuro Re-Ed        MTP/LTPs        Wall push ups        BUE ER w/TB                                        Ther Ex        UBE        Shldr rolls 10x F/R       Cerv AROM 10 x 10\" w/ERS       Cerv isometrics        Cerv retraction into pillow 10 x 5\"       Cerv stab at wall with FF ABD                                                                Ther Activity                        Gait Training                        Modalities                         3/3 Pt provided with HEP of all ex completed this date. Pt demonstrated good understanding of all ex.    "

## 2025-03-03 ENCOUNTER — EVALUATION (OUTPATIENT)
Dept: PHYSICAL THERAPY | Facility: CLINIC | Age: 35
End: 2025-03-03

## 2025-03-03 DIAGNOSIS — R29.898 NECK TIGHTNESS: Primary | ICD-10-CM

## 2025-03-03 DIAGNOSIS — G44.209 TENSION HEADACHE: ICD-10-CM

## 2025-03-03 PROCEDURE — 97162 PT EVAL MOD COMPLEX 30 MIN: CPT | Performed by: PHYSICAL MEDICINE & REHABILITATION

## 2025-03-03 PROCEDURE — 97110 THERAPEUTIC EXERCISES: CPT | Performed by: PHYSICAL MEDICINE & REHABILITATION

## 2025-03-05 ENCOUNTER — RA CDI HCC (OUTPATIENT)
Dept: OTHER | Facility: HOSPITAL | Age: 35
End: 2025-03-05

## 2025-03-05 PROBLEM — O09.292 HISTORY OF GESTATIONAL DIABETES IN PRIOR PREGNANCY, CURRENTLY PREGNANT IN SECOND TRIMESTER: Status: RESOLVED | Noted: 2022-03-21 | Resolved: 2025-03-05

## 2025-03-05 PROBLEM — Z3A.39 39 WEEKS GESTATION OF PREGNANCY: Status: RESOLVED | Noted: 2022-02-21 | Resolved: 2025-03-05

## 2025-03-05 PROBLEM — Z86.32 HISTORY OF GESTATIONAL DIABETES IN PRIOR PREGNANCY, CURRENTLY PREGNANT IN SECOND TRIMESTER: Status: RESOLVED | Noted: 2022-03-21 | Resolved: 2025-03-05

## 2025-03-05 PROBLEM — O99.322 DRUG DEPENDENCE DURING PREGNANCY IN SECOND TRIMESTER (HCC): Status: RESOLVED | Noted: 2022-03-21 | Resolved: 2025-03-05

## 2025-03-05 PROBLEM — O24.419 GESTATIONAL DIABETES MELLITUS (GDM) IN THIRD TRIMESTER: Status: RESOLVED | Noted: 2022-07-07 | Resolved: 2025-03-05

## 2025-03-05 PROBLEM — O35.EXX0 PYELECTASIS OF FETUS ON PRENATAL ULTRASOUND: Status: RESOLVED | Noted: 2022-04-13 | Resolved: 2025-03-05

## 2025-03-05 PROBLEM — F19.20 DRUG DEPENDENCE DURING PREGNANCY IN SECOND TRIMESTER (HCC): Status: RESOLVED | Noted: 2022-03-21 | Resolved: 2025-03-05

## 2025-03-05 PROBLEM — O35.EXX0 ENCOUNTER FOR REPEAT ULTRASOUND OF FETAL PYELECTASIS IN SINGLETON PREGNANCY, ANTEPARTUM: Status: RESOLVED | Noted: 2022-08-01 | Resolved: 2025-03-05

## 2025-03-05 PROBLEM — R73.09 ELEVATED GLUCOSE TOLERANCE TEST: Status: RESOLVED | Noted: 2022-06-27 | Resolved: 2025-03-05

## 2025-03-10 ENCOUNTER — OFFICE VISIT (OUTPATIENT)
Dept: PHYSICAL THERAPY | Facility: CLINIC | Age: 35
End: 2025-03-10

## 2025-03-10 DIAGNOSIS — G44.209 TENSION HEADACHE: ICD-10-CM

## 2025-03-10 DIAGNOSIS — R29.898 NECK TIGHTNESS: Primary | ICD-10-CM

## 2025-03-10 PROCEDURE — 97535 SELF CARE MNGMENT TRAINING: CPT

## 2025-03-10 PROCEDURE — 97140 MANUAL THERAPY 1/> REGIONS: CPT

## 2025-03-10 NOTE — PROGRESS NOTES
"Daily Note     Today's date: 3/10/2025  Patient name: Archana Zavaleta  : 1990  MRN: [id number]  Referring provider: Argelia Corbett, *  Dx:   Encounter Diagnosis     ICD-10-CM    1. Neck tightness  R29.898       2. Tension headache  G44.209                      Subjective:   Pt. states cerv issue is still prevalent.  Also still experiencing tension headaches.       Objective: See treatment diary below      Assessment: Tolerated treatment well with instruction for HEP and reception to MT/MHP applic.  Patient would benefit from continued PT. Will resume current exer and also incorporate new ther exer into treatment upon next visit.       Plan: Continue per plan of care.            Precautions: anxiety    Re-eval Date: 25    Date 3/3/25 3.10      Visit Count 1 2      FOTO Comp 3/3       Pain In See eval \"Tight\" @ cerv    Pain variable      Pain Out  Good benefits from MHP and MT apps            Manuals 3/3 3.10      SOR  **15 min        STM B UT/cerv areas  ** Myofasc tech.   15 min                      Neuro Re-Ed        MTP/LTPs  *NV        Wall push ups  *NV      BUE ER w/TB                                        Ther Ex  3.10      UBE  *NV        Shldr rolls 10x F/R Resume NV      Cerv AROM 10 x 10\" w/ERS Resume NV      Cerv isometrics  *NV      Cerv retraction into pillow 10 x 5\" Resume NV      Cerv stab at wall with FF ABD                                Pt. Educ/Self care  3.10        Instructed for HEP    Provided detailed pictoral HEP    15 min                      Ther Activity                          Gait Training                        Modalities  3.10        **MHP to cerv/ UT  10 min                   "

## 2025-03-12 ENCOUNTER — OFFICE VISIT (OUTPATIENT)
Dept: FAMILY MEDICINE CLINIC | Facility: CLINIC | Age: 35
End: 2025-03-12
Payer: COMMERCIAL

## 2025-03-12 VITALS
HEART RATE: 68 BPM | DIASTOLIC BLOOD PRESSURE: 80 MMHG | SYSTOLIC BLOOD PRESSURE: 102 MMHG | OXYGEN SATURATION: 99 % | TEMPERATURE: 95.8 F | BODY MASS INDEX: 21.49 KG/M2 | HEIGHT: 65 IN | WEIGHT: 129 LBS

## 2025-03-12 DIAGNOSIS — Z12.4 SCREENING FOR CERVICAL CANCER: ICD-10-CM

## 2025-03-12 DIAGNOSIS — E55.9 VITAMIN D DEFICIENCY: ICD-10-CM

## 2025-03-12 DIAGNOSIS — Z00.00 ANNUAL PHYSICAL EXAM: Primary | ICD-10-CM

## 2025-03-12 DIAGNOSIS — M62.838 MUSCLE SPASM: ICD-10-CM

## 2025-03-12 DIAGNOSIS — R00.2 PALPITATIONS: ICD-10-CM

## 2025-03-12 DIAGNOSIS — Z23 ENCOUNTER FOR IMMUNIZATION: ICD-10-CM

## 2025-03-12 PROCEDURE — 99395 PREV VISIT EST AGE 18-39: CPT | Performed by: NURSE PRACTITIONER

## 2025-03-12 PROCEDURE — 99214 OFFICE O/P EST MOD 30 MIN: CPT | Performed by: NURSE PRACTITIONER

## 2025-03-12 RX ORDER — ERGOCALCIFEROL 1.25 MG/1
50000 CAPSULE, LIQUID FILLED ORAL WEEKLY
Qty: 12 CAPSULE | Refills: 0 | Status: SHIPPED | OUTPATIENT
Start: 2025-03-12

## 2025-03-12 RX ORDER — METHOCARBAMOL 500 MG/1
500 TABLET, FILM COATED ORAL 3 TIMES DAILY PRN
Qty: 30 TABLET | Refills: 1 | Status: SHIPPED | OUTPATIENT
Start: 2025-03-12

## 2025-03-12 NOTE — ASSESSMENT & PLAN NOTE
No longer breastfeeding. Vit d was 19.8 will start weekly 50,000 units for 12 weeks then repeat labs.   Orders:  •  Vitamin D 25 hydroxy; Future  •  ergocalciferol (VITAMIN D2) 50,000 units; Take 1 capsule (50,000 Units total) by mouth once a week

## 2025-03-12 NOTE — PROGRESS NOTES
Adult Annual Physical  Name: Archana Zavaleta      : 1990      MRN: [id number]  Encounter Provider: EMMY Cameron  Encounter Date: 3/12/2025   Encounter department: [facility]    Assessment & Plan  Annual physical exam         Screening for cervical cancer  Patient has an appointment scheduled with obgyn next month        Encounter for immunization  Did not complete HPV vaccine series as she had near syncope with the second dose.        Vitamin D deficiency  No longer breastfeeding. Vit d was 19.8 will start weekly 50,000 units for 12 weeks then repeat labs.   Orders:  •  Vitamin D 25 hydroxy; Future  •  ergocalciferol (VITAMIN D2) 50,000 units; Take 1 capsule (50,000 Units total) by mouth once a week    Muscle spasm  Patient continues to have intermittent neck spasms. Was given robaxin 750 mg a few months ago at urgent care and it works well but makes her tired. Will decrease dose to 500 mg tid prn and can still make drowsy.  She is following with PT and states she gets sore after. Counseled. She is using heat and massage at home as well. Does yoga for exercise.   Orders:  •  methocarbamol (ROBAXIN) 500 mg tablet; Take 1 tablet (500 mg total) by mouth 3 (three) times a day as needed for muscle spasms    Palpitations  Still getting intermittent heart fluttering through out the day, will add mag level for when she gets labs completed again. Still did not get holter, order active.   Orders:  •  Magnesium; Future    Preventive Screenings:  - Diabetes Screening: screening up-to-date  - Cholesterol Screening: screening not indicated and has hyperlipidemia   - HIV screening: screening up-to-date   - Cervical cancer screening: risks/benefits discussed   - Lung cancer screening: screening not indicated   - Prostate cancer screening: screening not indicated     Immunizations:  - Immunizations due: Influenza and HPV (Gardasil 9)  - Risks/benefits immunizations discussed    -  "The patient declines recommended vaccines currently despite my recommendations      Counseling/Anticipatory Guidance:    - Diet: discussed recommendations for a healthy/well-balanced diet.   - Exercise: the importance of regular exercise/physical activity was discussed. Recommend exercise 3-5 times per week for at least 30 minutes.          History of Present Illness     Adult Annual Physical:  Patient presents for annual physical.     Diet and Physical Activity:    - Exercise:. yoga and exercise videos 3-4 times a week    Depression Screening:    - PHQ-9 Score: 0    General Health:  - Sleep: 7-8 hours of sleep on average and sleeps well.  - Hearing: normal hearing bilateral ears and normal hearing right ear.  - Vision: no vision problems.  - Dental: regular dental visits and brushes teeth twice daily.    /GYN Health:    - Menopause: premenopausal.   - Last menstrual cycle: 2/26/2025.     Review of Systems   Constitutional:  Negative for chills and fever.   HENT:  Negative for ear pain and sore throat.    Eyes:  Negative for pain and visual disturbance.   Respiratory:  Negative for cough and shortness of breath.    Cardiovascular:  Positive for palpitations. Negative for chest pain.   Gastrointestinal:  Negative for abdominal pain and vomiting.   Genitourinary:  Negative for dysuria and hematuria.   Musculoskeletal:  Positive for neck pain. Negative for arthralgias, back pain and myalgias.   Skin:  Negative for color change and rash.   Neurological:  Negative for seizures and syncope.   All other systems reviewed and are negative.        Objective   /80   Pulse 68   Temp (!) 95.8 °F (35.4 °C) (Tympanic)   Ht 5' 5\" (1.651 m)   Wt 58.5 kg (129 lb)   SpO2 99%   BMI 21.47 kg/m²     Physical Exam  Vitals and nursing note reviewed.   Constitutional:       General: She is not in acute distress.     Appearance: Normal appearance. She is well-developed and normal weight.   HENT:      Head: Normocephalic and " atraumatic.      Right Ear: Tympanic membrane, ear canal and external ear normal. There is no impacted cerumen.      Left Ear: Tympanic membrane, ear canal and external ear normal. There is no impacted cerumen.      Nose: Nose normal. No congestion or rhinorrhea.      Mouth/Throat:      Mouth: Mucous membranes are moist.      Pharynx: Oropharynx is clear. No oropharyngeal exudate or posterior oropharyngeal erythema.   Eyes:      General: No scleral icterus.        Right eye: No discharge.         Left eye: No discharge.      Extraocular Movements: Extraocular movements intact.      Conjunctiva/sclera: Conjunctivae normal.      Pupils: Pupils are equal, round, and reactive to light.   Cardiovascular:      Rate and Rhythm: Normal rate and regular rhythm.      Pulses: Normal pulses.      Heart sounds: Normal heart sounds. No murmur heard.  Pulmonary:      Effort: Pulmonary effort is normal. No respiratory distress.      Breath sounds: Normal breath sounds. No wheezing or rales.   Abdominal:      General: Abdomen is flat. Bowel sounds are normal. There is no distension.      Palpations: Abdomen is soft.      Tenderness: There is no abdominal tenderness. There is no guarding.   Musculoskeletal:         General: No swelling. Normal range of motion.      Cervical back: Normal range of motion and neck supple. No rigidity or tenderness.      Right lower leg: No edema.      Left lower leg: No edema.   Lymphadenopathy:      Cervical: No cervical adenopathy.   Skin:     General: Skin is warm and dry.      Capillary Refill: Capillary refill takes less than 2 seconds.      Findings: No bruising, erythema or lesion.   Neurological:      General: No focal deficit present.      Mental Status: She is alert and oriented to person, place, and time. Mental status is at baseline.      Motor: No weakness.      Coordination: Coordination normal.      Gait: Gait normal.   Psychiatric:         Mood and Affect: Mood normal.         Behavior:  Behavior normal.         Thought Content: Thought content normal.         Judgment: Judgment normal.

## 2025-03-24 ENCOUNTER — OFFICE VISIT (OUTPATIENT)
Dept: PHYSICAL THERAPY | Facility: CLINIC | Age: 35
End: 2025-03-24
Payer: COMMERCIAL

## 2025-03-24 DIAGNOSIS — R29.898 NECK TIGHTNESS: Primary | ICD-10-CM

## 2025-03-24 DIAGNOSIS — G44.209 TENSION HEADACHE: ICD-10-CM

## 2025-03-24 PROCEDURE — 97110 THERAPEUTIC EXERCISES: CPT | Performed by: PHYSICAL MEDICINE & REHABILITATION

## 2025-03-24 NOTE — PROGRESS NOTES
"Daily Note     Today's date: 3/24/2025  Patient name: Archana Zavaleta  : 1990  MRN: [id number]  Referring provider: Argelia Corbett, *  Dx:   Encounter Diagnosis     ICD-10-CM    1. Neck tightness  R29.898       2. Tension headache  G44.209                      Subjective: Pt notes her pain level is 2/10 and states her LUT has really been bothering  her.  N      Objective: See treatment diary below      Assessment: Tolerated treatment well. Patient exhibited good technique with therapeutic exercises and would benefit from continued PT. Discussed pt's HEP and reviewed several ex on HEP. Plan to add more ex NV and cont AROM exand isometrics as HEP  Pt reported good benefits following PT session and noted feeling quite a bit looser      Plan: Continue per plan of care.      Precautions: anxiety    Re-eval Date: 25    Date 3/3/25 3.10 3/24     Visit Count 1 2 3     FOTO Comp 3/3       Pain In See eval \"Tight\" @ cerv    Pain variable 2/10      Pain Out  Good benefits from MHP and MT apps Feeling looser           Manuals 3/3 3.10 3/24     SOR  **15 min        STM B UT/cerv areas  ** Myofasc tech.   15 min                      Neuro Re-Ed        MTP/LTPs  *NV        Wall push ups  *NV      BUE ER w/TB                                        Ther Ex  3.10 3/24     UBE  *NV   100 rpm  10' alt     Shldr rolls 10x F/R Resume NV 10x F/R     Cerv AROM 10 x 10\" w/ERS Resume NV 10 x 10\" w/ERS HEP    Cerv isometrics  *NV 5 x 5\" HEP    Cerv retraction into pillow 10 x 5\" Resume NV 10 x 5\"     Cerv stab at wall with FF ABD                                Pt. Educ/Self care  3.10        Instructed for HEP    Provided detailed pictoral HEP    15 min                      Ther Activity                          Gait Training                        Modalities  3.10        **MHP to cerv/ UT  10 min                     "

## 2025-04-15 ENCOUNTER — ANNUAL EXAM (OUTPATIENT)
Dept: OBGYN CLINIC | Facility: CLINIC | Age: 35
End: 2025-04-15
Payer: COMMERCIAL

## 2025-04-15 VITALS
DIASTOLIC BLOOD PRESSURE: 78 MMHG | HEIGHT: 65 IN | WEIGHT: 129.4 LBS | SYSTOLIC BLOOD PRESSURE: 122 MMHG | BODY MASS INDEX: 21.56 KG/M2

## 2025-04-15 DIAGNOSIS — N81.6 RECTOCELE: ICD-10-CM

## 2025-04-15 DIAGNOSIS — B37.31 YEAST VAGINITIS: ICD-10-CM

## 2025-04-15 DIAGNOSIS — Z01.419 WELL WOMAN EXAM WITH ROUTINE GYNECOLOGICAL EXAM: Primary | ICD-10-CM

## 2025-04-15 PROCEDURE — S0612 ANNUAL GYNECOLOGICAL EXAMINA: HCPCS | Performed by: STUDENT IN AN ORGANIZED HEALTH CARE EDUCATION/TRAINING PROGRAM

## 2025-04-15 PROCEDURE — G0145 SCR C/V CYTO,THINLAYER,RESCR: HCPCS | Performed by: STUDENT IN AN ORGANIZED HEALTH CARE EDUCATION/TRAINING PROGRAM

## 2025-04-15 PROCEDURE — G0476 HPV COMBO ASSAY CA SCREEN: HCPCS | Performed by: STUDENT IN AN ORGANIZED HEALTH CARE EDUCATION/TRAINING PROGRAM

## 2025-04-15 PROCEDURE — 99213 OFFICE O/P EST LOW 20 MIN: CPT | Performed by: STUDENT IN AN ORGANIZED HEALTH CARE EDUCATION/TRAINING PROGRAM

## 2025-04-15 RX ORDER — FLUCONAZOLE 150 MG/1
150 TABLET ORAL
Qty: 7 TABLET | Refills: 0 | Status: SHIPPED | OUTPATIENT
Start: 2025-04-15 | End: 2025-05-04

## 2025-04-15 NOTE — PROGRESS NOTES
Name: Archana Zavaleta      : 1990      MRN: [id number]  Encounter Provider: Yanira Tolliver MD  Encounter Date: 4/15/2025   Encounter department: [facility]  :  Assessment & Plan  Well woman exam with routine gynecological exam  - Routine well woman exam completed today.  - Cervical Cancer Screening: Current ASCCP Guidelines reviewed. Last Pap: 2019. Next Pap Due: done today  - HPV Vaccination status: immunized  - STI screening offered including HIV: declines  - Contraception: condoms  - Breast Cancer Screening: Last Mammogram Not on file, age 38  - Colorectal cancer screening: Not on file, next due age 43  - The following were reviewed in today's visit: mildly symptomatic rectocele, yeast vaginitis    Orders:    Liquid-based pap, screening    Yeast vaginitis    Orders:    fluconazole (DIFLUCAN) 150 mg tablet; Take 1 tablet (150 mg total) by mouth every third day for 7 doses    Rectocele             History of Present Illness   She reports  no new changes to her health.  She reports no breast concerns. She gets regular periods. She reports symptoms of a yeast infection. She has no sexual health concerns and is currently sexually active with one male partner.  She contracepts with condoms. She has symptoms of a rectocele. She denies intimate partner violence.          Archana Zavaleta is a 34 y.o. female who presents for annual and problem.    History obtained from: patient    Review of Systems   Constitutional:  Negative for chills and fever.   Respiratory:  Negative for cough and shortness of breath.    Cardiovascular:  Negative for chest pain and leg swelling.   Gastrointestinal:  Negative for abdominal pain, nausea and vomiting.   Genitourinary:  Negative for dysuria, frequency and urgency.   Neurological:  Negative for dizziness, light-headedness and headaches.     Medical History Reviewed by provider this encounter:     .     Objective   /78    "Ht 5' 5\" (1.651 m)   Wt 58.7 kg (129 lb 6.4 oz)   LMP 03/27/2025 (Exact Date)   BMI 21.53 kg/m²      Physical Exam  Constitutional:       Appearance: Normal appearance.   HENT:      Head: Normocephalic and atraumatic.   Cardiovascular:      Rate and Rhythm: Normal rate.      Pulses: Normal pulses.   Pulmonary:      Effort: Pulmonary effort is normal.   Chest:   Breasts:     Right: Normal. No swelling, bleeding, inverted nipple, mass, nipple discharge, skin change or tenderness.      Left: Normal. No swelling, bleeding, inverted nipple, mass, nipple discharge, skin change or tenderness.   Abdominal:      General: There is no distension.      Palpations: Abdomen is soft.      Tenderness: There is no abdominal tenderness.      Hernia: There is no hernia in the left inguinal area or right inguinal area.   Genitourinary:     Exam position: Lithotomy position.      Labia:         Right: No rash, tenderness or lesion.         Left: No rash, tenderness or lesion.       Urethra: No prolapse or urethral lesion.      Vagina: Vaginal discharge (yeast) and prolapsed vaginal walls (mild rectocele) present. No bleeding or lesions.      Cervix: Normal. No lesion or cervical bleeding.      Uterus: Normal. Not enlarged, not tender and no uterine prolapse.       Adnexa: Right adnexa normal and left adnexa normal.        Right: No tenderness or fullness.          Left: No tenderness or fullness.        Rectum: No anal fissure or external hemorrhoid.   Musculoskeletal:         General: Normal range of motion.   Lymphadenopathy:      Upper Body:      Right upper body: No supraclavicular, axillary or pectoral adenopathy.      Left upper body: No supraclavicular, axillary or pectoral adenopathy.      Lower Body: No right inguinal adenopathy. No left inguinal adenopathy.   Skin:     General: Skin is warm and dry.   Neurological:      General: No focal deficit present.      Mental Status: She is alert and oriented to person, place, and " time.   Psychiatric:         Mood and Affect: Mood normal.         Behavior: Behavior normal.

## 2025-04-16 LAB
HPV HR 12 DNA CVX QL NAA+PROBE: NEGATIVE
HPV16 DNA CVX QL NAA+PROBE: NEGATIVE
HPV18 DNA CVX QL NAA+PROBE: NEGATIVE

## 2025-04-17 ENCOUNTER — RESULTS FOLLOW-UP (OUTPATIENT)
Dept: OBGYN CLINIC | Facility: MEDICAL CENTER | Age: 35
End: 2025-04-17

## 2025-04-17 LAB
LAB AP GYN PRIMARY INTERPRETATION: NORMAL
Lab: NORMAL

## 2025-05-12 ENCOUNTER — TELEMEDICINE (OUTPATIENT)
Dept: PSYCHIATRY | Facility: CLINIC | Age: 35
End: 2025-05-12
Payer: COMMERCIAL

## 2025-05-12 DIAGNOSIS — F41.1 GAD (GENERALIZED ANXIETY DISORDER): ICD-10-CM

## 2025-05-12 DIAGNOSIS — F32.5 MAJOR DEPRESSION IN REMISSION (HCC): Primary | ICD-10-CM

## 2025-05-12 PROCEDURE — 99213 OFFICE O/P EST LOW 20 MIN: CPT | Performed by: PHYSICIAN ASSISTANT

## 2025-05-13 ENCOUNTER — OFFICE VISIT (OUTPATIENT)
Dept: OBGYN CLINIC | Facility: CLINIC | Age: 35
End: 2025-05-13
Payer: COMMERCIAL

## 2025-05-13 ENCOUNTER — NURSE TRIAGE (OUTPATIENT)
Age: 35
End: 2025-05-13

## 2025-05-13 VITALS
SYSTOLIC BLOOD PRESSURE: 118 MMHG | BODY MASS INDEX: 21.16 KG/M2 | DIASTOLIC BLOOD PRESSURE: 70 MMHG | HEIGHT: 65 IN | WEIGHT: 127 LBS

## 2025-05-13 DIAGNOSIS — L02.32 BOIL OF BUTTOCK: ICD-10-CM

## 2025-05-13 DIAGNOSIS — N81.89 PELVIC FLOOR RELAXATION: Primary | ICD-10-CM

## 2025-05-13 DIAGNOSIS — R10.2 PELVIC PAIN: ICD-10-CM

## 2025-05-13 DIAGNOSIS — N89.8 VAGINAL ODOR: ICD-10-CM

## 2025-05-13 PROCEDURE — 87480 CANDIDA DNA DIR PROBE: CPT | Performed by: OBSTETRICS & GYNECOLOGY

## 2025-05-13 PROCEDURE — 99214 OFFICE O/P EST MOD 30 MIN: CPT | Performed by: OBSTETRICS & GYNECOLOGY

## 2025-05-13 PROCEDURE — 87660 TRICHOMONAS VAGIN DIR PROBE: CPT | Performed by: OBSTETRICS & GYNECOLOGY

## 2025-05-13 PROCEDURE — 87510 GARDNER VAG DNA DIR PROBE: CPT | Performed by: OBSTETRICS & GYNECOLOGY

## 2025-05-13 NOTE — TELEPHONE ENCOUNTER
"FOLLOW UP: warm transfer to [place] in the office for scheduling    REASON FOR CONVERSATION: Vaginitis    SYMPTOMS: report thick white discharge and intermittent itching since February. She was previously treated for a [place] at that time. Patient additionally report marble sized tender lump on perineum. Lump was previously the size of a pea and hard but it has gotten larger in the last couple days    OTHER:     DISPOSITION: See Within 3 Days in Office      Reason for Disposition   Tender lump (swelling or \"ball\") at vaginal opening    Answer Assessment - Initial Assessment Questions  1. SYMPTOM: \"What's the main symptom you're concerned about?\" (e.g., pain, itching, dryness)      intermittent itching and increased amount thick white discharge  2. LOCATION: \"Where is the  s/s located?\" (e.g., inside/outside, left/right)      Vulvovaginal  3. ONSET: \"When did the  s/s  start?\"      February -   4. PAIN: \"Is there any pain?\" If Yes, ask: \"How bad is it?\" (Scale: 1-10; mild, moderate, severe)      Denies  5. ITCHING: \"Is there any itching?\" If Yes, ask: \"How bad is it?\" (Scale: 1-10; mild, moderate, severe)      Mild intermittent  6. CAUSE: \"What do you think is causing the discharge?\" \"Have you had the same problem before?\" \"What happened then?\"      unsure  7. OTHER SYMPTOMS: \"Do you have any other symptoms?\" (e.g., fever, itching, vaginal bleeding, pain with urination, injury to genital area, vaginal foreign body)      Denies fever  8. PREGNANCY: \"Is there any chance you are pregnant?\" \"When was your last menstrual period?\"      Denies     Tender pea sized lump on perineum about the size of a marble now    Protocols used: Vaginal Symptoms-Adult-[place]    "

## 2025-05-13 NOTE — PROGRESS NOTES
"Name: Archana Zavaleta      : 1990      MRN: [id number]  Encounter Provider: Justa Duffy MD  Encounter Date: 2025   Encounter department: [facility]  :  Assessment & Plan  Pelvic floor relaxation    Orders:    Ambulatory Referral to Physical Therapy; Future  states feels  bulge  in  vagina when  she  goes to strain for BM   On  exam no obvious prolapse seen , we discussed  PT referral  to help  with pelvic floor support    Pelvic pain    Orders:    US pelvis complete w transvaginal; Future    Ambulatory Referral to Physical Therapy; Future  recent  stopped breastfeeding and  suspect some of this discomfort is related to ovarian function    Vaginal odor    Orders:    Vaginosis DNA Probe  has been treating with  diflucan but no  improvement  - affirm discussed  and collected     Boil of buttock       supportive measures  discussed aware  no  drainable  lesion present        History of Present Illness   HPI  Archana Zavaleta is a 34 y.o. female who presents with  several  concerns   Vaginal and  vulvar irritation  burning - has been self  treating with  diflucan but no resolutin of  symptoms/ her sister has  lichen therefore was concerned   Bulge in  vaginal  with  straning for  BM  Pelvic  cramps   Left  buttock palpable  lump   History obtained from: patient    Review of Systems   Constitutional: Negative.    HENT: Negative.     Respiratory: Negative.     Genitourinary:  Positive for pelvic pain and vaginal discharge. Negative for difficulty urinating, dyspareunia, dysuria and enuresis.        Vaginal irritations , palpable lump on  left  buttock area            Objective   /70   Ht 5' 5\" (1.651 m)   Wt 57.6 kg (127 lb)   LMP 2025 (Exact Date)   BMI 21.13 kg/m²      Physical Exam  Vitals reviewed.   Constitutional:       Appearance: Normal appearance.   HENT:      Head: Normocephalic and atraumatic.      Nose: Nose normal.   Eyes:      " Conjunctiva/sclera: Conjunctivae normal.   Pulmonary:      Effort: Pulmonary effort is normal.   Genitourinary:     General: Normal vulva.      Vagina: Normal.      Cervix: Normal.      Uterus: Normal.       Adnexa: Right adnexa normal and left adnexa normal.          Comments: Boil palpable deep and  no  area able to be  drained at time of visit    Neurological:      General: No focal deficit present.      Mental Status: She is alert and oriented to person, place, and time.   Psychiatric:         Mood and Affect: Mood normal.         Behavior: Behavior normal.

## 2025-05-14 LAB
CANDIDA RRNA VAG QL PROBE: NOT DETECTED
G VAGINALIS RRNA GENITAL QL PROBE: NOT DETECTED
T VAGINALIS RRNA GENITAL QL PROBE: NOT DETECTED

## 2025-05-15 ENCOUNTER — RESULTS FOLLOW-UP (OUTPATIENT)
Dept: OBGYN CLINIC | Facility: MEDICAL CENTER | Age: 35
End: 2025-05-15

## 2025-05-20 RX ORDER — TRAZODONE HYDROCHLORIDE 50 MG/1
50 TABLET ORAL
Qty: 90 TABLET | Refills: 0 | Status: SHIPPED | OUTPATIENT
Start: 2025-05-20 | End: 2025-08-18

## 2025-05-20 RX ORDER — SERTRALINE HYDROCHLORIDE 100 MG/1
100 TABLET, FILM COATED ORAL DAILY
Qty: 90 TABLET | Refills: 1 | Status: SHIPPED | OUTPATIENT
Start: 2025-05-20 | End: 2025-11-16

## 2025-05-20 NOTE — ASSESSMENT & PLAN NOTE
Cont zoloft 150 mg/d.        Current Outpatient Medications   Medication Sig Dispense Refill    sertraline (ZOLOFT) 100 mg tablet Take 1 tablet (100 mg total) by mouth daily 90 tablet 1    sertraline (ZOLOFT) 50 mg tablet Take 1 tablet (50 mg total) by mouth daily 90 tablet 1    traZODone (DESYREL) 50 mg tablet Take 1 tablet (50 mg total) by mouth daily at bedtime as needed for sleep 90 tablet 0    ergocalciferol (VITAMIN D2) 50,000 units Take 1 capsule (50,000 Units total) by mouth once a week 12 capsule 0    methocarbamol (ROBAXIN) 500 mg tablet Take 1 tablet (500 mg total) by mouth 3 (three) times a day as needed for muscle spasms 30 tablet 1    polyethylene glycol (COLYTE) 4000 mL solution Take 4,000 mL by mouth once for 1 dose Take 4000 mL by mouth once for 1 dose. Use as directed 4000 mL 0    Prenatal Vit-Fe Fumarate-FA (PRENATAL VITAMINS PO) Take by mouth (Patient not taking: Reported on 3/12/2025)      senna-docusate sodium (SENOKOT-S) 8.6-50 mg per tablet Take 1 tablet by mouth every other day (Patient not taking: Reported on 3/12/2025)       No current facility-administered medications for this visit.

## 2025-05-20 NOTE — ASSESSMENT & PLAN NOTE
Cont zoloft 150 mg/d  Orders:    sertraline (ZOLOFT) 100 mg tablet; Take 1 tablet (100 mg total) by mouth daily    sertraline (ZOLOFT) 50 mg tablet; Take 1 tablet (50 mg total) by mouth daily    traZODone (DESYREL) 50 mg tablet; Take 1 tablet (50 mg total) by mouth daily at bedtime as needed for sleep

## 2025-05-20 NOTE — BH TREATMENT PLAN
TREATMENT PLAN (Medication Management Only)        [facility]    Name and Date of Birth:  Archana Zavaleta 34 y.o. 1990  MRN: [id number]  Date of Treatment Plan: May 20, 2025  Diagnosis/Diagnoses:    1. Major depression in remission (HCC)    2. PEPE (generalized anxiety disorder)      Strengths/Personal Resources for Self-Care: taking medications as prescribed, ability to adapt to life changes, family ties, good physical health, self-reliance.  Area/Areas of need (in own words): anxiety, depression  1. Long Term Goal:   lessen anxiety and cont stability of depression  Target Date:4 months - 9/20/2025  Person/Persons responsible for completion of goal: Archana  2.  Short Term Objective (s) - How will we reach this goal?:   A.  Provider new recommended medication/dosage changes and/or continue medication(s): continue current medications as prescribed.  B.  Attend medication management appointments regularly..  C.  Take psychiatric medications responsibly..  Target Date:4 months - 9/20/2025  Person/Persons Responsible for Completion of Goal: Archana  Progress Towards Goals: continuing treatment  Treatment Modality: medication management every 4 months  Review due 180 days from date of this plan: 4 months - 9/20/2025  Expected length of service: ongoing treatment unless revised  My Physician/PA/NP and I have developed this plan together and I agree to work on the goals and objectives. I understand the treatment goals that were developed for my treatment.   Electronic Signatures: on file (unless signed below)    Samara Hernandez PA-C 05/20/25
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
## 2025-06-17 ENCOUNTER — HOSPITAL ENCOUNTER (OUTPATIENT)
Dept: NON INVASIVE DIAGNOSTICS | Facility: HOSPITAL | Age: 35
Discharge: HOME | End: 2025-06-17
Attending: NURSE PRACTITIONER
Payer: COMMERCIAL

## 2025-06-17 DIAGNOSIS — R00.2 PALPITATIONS: ICD-10-CM

## 2025-06-17 PROCEDURE — 93226 XTRNL ECG REC<48 HR SCAN A/R: CPT

## 2025-06-17 PROCEDURE — 93225 XTRNL ECG REC<48 HRS REC: CPT

## 2025-06-24 PROCEDURE — 93227 XTRNL ECG REC<48 HR R&I: CPT | Performed by: INTERNAL MEDICINE

## 2025-07-09 ENCOUNTER — TELEPHONE (OUTPATIENT)
Dept: DERMATOLOGY | Facility: CLINIC | Age: 35
End: 2025-07-09

## 2025-07-09 NOTE — TELEPHONE ENCOUNTER
LMOM advising pt that appt on 09/08/25 w/Marilee  needs to be r/s due to a change in her schedule she will no longer be working on Mondays or Fridays. Please give the office a call to confirm or r/s.  If pt calls back I do not need to speak to them, reschedule appropriately

## 2025-08-20 DIAGNOSIS — F41.1 GAD (GENERALIZED ANXIETY DISORDER): ICD-10-CM

## 2025-08-20 RX ORDER — TRAZODONE HYDROCHLORIDE 50 MG/1
50 TABLET ORAL
Qty: 90 TABLET | Refills: 0 | Status: SHIPPED | OUTPATIENT
Start: 2025-08-20 | End: 2025-11-18